# Patient Record
Sex: FEMALE | Race: WHITE | Employment: FULL TIME | URBAN - METROPOLITAN AREA
[De-identification: names, ages, dates, MRNs, and addresses within clinical notes are randomized per-mention and may not be internally consistent; named-entity substitution may affect disease eponyms.]

---

## 2017-01-05 DIAGNOSIS — G89.29 CHRONIC NONINTRACTABLE HEADACHE, UNSPECIFIED HEADACHE TYPE: ICD-10-CM

## 2017-01-05 DIAGNOSIS — R51.9 CHRONIC NONINTRACTABLE HEADACHE, UNSPECIFIED HEADACHE TYPE: ICD-10-CM

## 2017-01-05 DIAGNOSIS — M54.2 CERVICALGIA: ICD-10-CM

## 2017-01-05 RX ORDER — BUTALBITAL, ACETAMINOPHEN AND CAFFEINE 50; 325; 40 MG/1; MG/1; MG/1
TABLET ORAL
Qty: 60 TAB | Refills: 0 | Status: SHIPPED | OUTPATIENT
Start: 2017-01-05 | End: 2017-02-13 | Stop reason: SDUPTHER

## 2017-01-05 NOTE — TELEPHONE ENCOUNTER
Patient wants to get the medication for butalbital-acetaminophen-caffeine (FIORICET, ESGIC) -40 mg per tablet.   Please give her a call @ [phone]

## 2017-01-06 ENCOUNTER — TELEPHONE (OUTPATIENT)
Dept: FAMILY MEDICINE CLINIC | Age: 42
End: 2017-01-06

## 2017-02-13 DIAGNOSIS — M54.2 CERVICALGIA: ICD-10-CM

## 2017-02-13 DIAGNOSIS — R51.9 CHRONIC NONINTRACTABLE HEADACHE, UNSPECIFIED HEADACHE TYPE: ICD-10-CM

## 2017-02-13 DIAGNOSIS — G89.29 CHRONIC NONINTRACTABLE HEADACHE, UNSPECIFIED HEADACHE TYPE: ICD-10-CM

## 2017-02-13 RX ORDER — BUTALBITAL, ACETAMINOPHEN AND CAFFEINE 50; 325; 40 MG/1; MG/1; MG/1
TABLET ORAL
Qty: 60 TAB | Refills: 0 | Status: SHIPPED | OUTPATIENT
Start: 2017-02-13 | End: 2017-07-11 | Stop reason: SDUPTHER

## 2017-02-13 RX ORDER — BUTALBITAL, ACETAMINOPHEN AND CAFFEINE 50; 325; 40 MG/1; MG/1; MG/1
TABLET ORAL
Qty: 60 TAB | Refills: 0 | Status: SHIPPED | OUTPATIENT
Start: 2017-02-13 | End: 2017-03-16 | Stop reason: SDUPTHER

## 2017-02-13 NOTE — TELEPHONE ENCOUNTER
----- Message from Stefan Rivera sent at 2/13/2017  3:11 PM EST -----  Regarding: Dr Campoverde/rx refill  Pt's (p) [phone], pt said she needs for Dr Anya Laura to do a refill for her migraine medication   to be called [facility], the one listed in her chart . And would like a return call back from the nurse to discuss some test  results she had done. Her CT Scan US and a doppler.

## 2017-02-13 NOTE — TELEPHONE ENCOUNTER
Patient states that she has CT Scan, US and dopplers done at [facility] [address], they told her that they were faxing the results today. We have not received them yet.  Patients telephone # [phone]

## 2017-03-03 ENCOUNTER — OFFICE VISIT (OUTPATIENT)
Dept: FAMILY MEDICINE CLINIC | Age: 42
End: 2017-03-03

## 2017-03-03 VITALS
HEIGHT: 62 IN | DIASTOLIC BLOOD PRESSURE: 76 MMHG | SYSTOLIC BLOOD PRESSURE: 106 MMHG | OXYGEN SATURATION: 98 % | BODY MASS INDEX: 25.58 KG/M2 | TEMPERATURE: 98.5 F | HEART RATE: 87 BPM | WEIGHT: 139 LBS | RESPIRATION RATE: 26 BRPM

## 2017-03-03 DIAGNOSIS — J45.31 ASTHMATIC BRONCHITIS, MILD PERSISTENT, WITH ACUTE EXACERBATION: ICD-10-CM

## 2017-03-03 DIAGNOSIS — J32.0 MAXILLARY SINUSITIS, UNSPECIFIED CHRONICITY: Primary | ICD-10-CM

## 2017-03-03 RX ORDER — AZITHROMYCIN 250 MG/1
TABLET, FILM COATED ORAL
Qty: 6 TAB | Refills: 0 | Status: SHIPPED | OUTPATIENT
Start: 2017-03-03 | End: 2017-05-26 | Stop reason: ALTCHOICE

## 2017-03-03 RX ORDER — HYDROCODONE POLISTIREX AND CHLORPHENIRAMINE POLISTIREX 10; 8 MG/5ML; MG/5ML
2.5 SUSPENSION, EXTENDED RELEASE ORAL
Qty: 120 ML | Refills: 0 | Status: SHIPPED | OUTPATIENT
Start: 2017-03-03 | End: 2017-05-26 | Stop reason: DRUGHIGH

## 2017-03-03 RX ORDER — PREDNISONE 10 MG/1
10 TABLET ORAL
Qty: 10 TAB | Refills: 0 | Status: SHIPPED | OUTPATIENT
Start: 2017-03-03 | End: 2017-05-26 | Stop reason: ALTCHOICE

## 2017-03-03 NOTE — PROGRESS NOTES
Chief Complaint   Patient presents with    Cold Symptoms     Pt has cough, congestion and bodyaches.

## 2017-03-03 NOTE — MR AVS SNAPSHOT
Visit Information Date & Time Provider Department Dept. Phone Encounter #  
 3/3/2017 11:00 AM Yoni Lugo [phone] [id number] Follow-up Instructions Return if symptoms worsen or fail to improve. Upcoming Health Maintenance Date Due  
 PAP AKA CERVICAL CYTOLOGY 3/5/1997 DTaP/Tdap/Td series (2 - Td) 3/3/2027 Allergies as of 3/3/2017  Review Complete On: 3/3/2017 By: Karena Medina Severity Noted Reaction Type Reactions Adhesive Tape-silicones  03/54/7614    Other (comments) Pt states this burns her skin. Current Immunizations  Reviewed on 4/13/2015 No immunizations on file. Not reviewed this visit You Were Diagnosed With   
  
 Codes Comments Maxillary sinusitis, unspecified chronicity    -  Primary ICD-10-CM: J32.0 ICD-9-CM: 473.0 Asthmatic bronchitis, mild persistent, with acute exacerbation     ICD-10-CM: J45.31 
ICD-9-CM: 493.92 Vitals BP  
  
  
  
  
  
 106/76 (BP 1 Location: Left arm, BP Patient Position: Sitting) Vitals History BMI and BSA Data Body Mass Index Body Surface Area  
 25.84 kg/m 2 1.65 m 2 Preferred Pharmacy Pharmacy Name Phone [facility] [address] [phone] Your Updated Medication List  
  
   
This list is accurate as of: 3/3/17 11:21 AM.  Always use your most recent med list.  
  
  
  
  
 albuterol 90 mcg/actuation inhaler Commonly known as:  PROVENTIL HFA Take 2 Puffs by inhalation every four (4) hours as needed for Wheezing. albuterol-ipratropium 2.5 mg-0.5 mg/3 ml Nebu Commonly known as:  DUO-NEB  
3 mL by Nebulization route every six (6) hours as needed. aspirin 81 mg chewable tablet Take 81 mg by mouth daily. azithromycin 250 mg tablet Commonly known as:  Tiffani Cui Take 2 tablets today, then take 1 tablet daily * butalbital-acetaminophen-caffeine -40 mg per tablet Commonly known as:  FIORICET, ESGIC  
TAKE 1 TABLET BY MOUTH EVERY 6 HOURS AS NEEDED FOR HEADACHE * butalbital-acetaminophen-caffeine -40 mg per tablet Commonly known as:  FIORICET, ESGIC  
TAKE 1 TABLET BY MOUTH EVERY 6 HOURS AS NEEDED HEADACHE  
  
 calcium 500 mg Tab Take  by mouth. chlorpheniramine-HYDROcodone 10-8 mg/5 mL suspension Commonly known as:  Sachi Saas Take 2.5 mL by mouth every twelve (12) hours as needed for Cough. Max Daily Amount: 5 mL. cholecalciferol (VITAMIN D3) 5,000 unit Tab tablet Commonly known as:  VITAMIN D3 Take  by mouth daily. cyanocobalamin 100 mcg tablet Commonly known as:  VITAMIN B12 Take 100 mcg by mouth daily. multivitamin tablet Commonly known as:  ONE A DAY Take 1 Tab by mouth daily. predniSONE 10 mg tablet Commonly known as:  Therman Rail Take 1 Tab by mouth daily (with breakfast). PriLOSEC 40 mg capsule Generic drug:  omeprazole Take 40 mg by mouth daily. * Notice: This list has 2 medication(s) that are the same as other medications prescribed for you. Read the directions carefully, and ask your doctor or other care provider to review them with you. Prescriptions Printed Refills  
 azithromycin (ZITHROMAX) 250 mg tablet 0 Sig: Take 2 tablets today, then take 1 tablet daily Class: Print  
 chlorpheniramine-HYDROcodone (TUSSIONEX) 10-8 mg/5 mL suspension 0 Sig: Take 2.5 mL by mouth every twelve (12) hours as needed for Cough. Max Daily Amount: 5 mL. Class: Print Route: Oral  
 predniSONE (DELTASONE) 10 mg tablet 0 Sig: Take 1 Tab by mouth daily (with breakfast). Class: Print Route: Oral  
  
Follow-up Instructions Return if symptoms worsen or fail to improve. Introducing [facility] SERVICES! Pedro Harris introduces Bitstrips patient portal. Now you can access parts of your medical record, email your doctor's office, and request medication refills online. 1. In your internet browser, go to [email or web address] 2. Click on the First Time User? Click Here link in the Sign In box. You will see the New Member Sign Up page. 3. Enter your Bitstrips Access Code exactly as it appears below. You will not need to use this code after youve completed the sign-up process. If you do not sign up before the expiration date, you must request a new code. · Bitstrips Access Code: [email or web address] Expires: 6/1/2017 11:20 AM 
 
4. Enter the last four digits of your Social Security Number (xxxx) and Date of Birth (mm/dd/yyyy) as indicated and click Submit. You will be taken to the next sign-up page. 5. Create a Bitstrips ID. This will be your Bitstrips login ID and cannot be changed, so think of one that is secure and easy to remember. 6. Create a Bitstrips password. You can change your password at any time. 7. Enter your Password Reset Question and Answer. This can be used at a later time if you forget your password. 8. Enter your e-mail address. You will receive e-mail notification when new information is available in [address] 9. Click Sign Up. You can now view and download portions of your medical record. 10. Click the Download Summary menu link to download a portable copy of your medical information. If you have questions, please visit the Frequently Asked Questions section of the Bitstrips website. Remember, Bitstrips is NOT to be used for urgent needs. For medical emergencies, dial 911. Now available from your iPhone and Android! Please provide this summary of care documentation to your next provider. Your primary care clinician is listed as Karlos Bellamy. If you have any questions after today's visit, please call [phone].

## 2017-03-03 NOTE — PROGRESS NOTES
HISTORY OF PRESENT ILLNESS  Sanam Billy is a 37 y.o. female. 2 days sinusitis sx with forceful cough  Cold Symptoms   The history is provided by the patient. This is a new problem. The current episode started 2 days ago. The problem has been gradually worsening. The cough is productive of purulent sputum. There has been a fever of 101 - 101.9 F. Associated symptoms include headaches, rhinorrhea, sore throat and myalgias. Pertinent negatives include no chills. Review of Systems   Constitutional: Positive for malaise/fatigue. Negative for chills and fever. HENT: Positive for rhinorrhea and sore throat. Musculoskeletal: Positive for myalgias. Neurological: Positive for headaches. Physical Exam   Constitutional: She appears well-developed and well-nourished. HENT:   Head: Normocephalic and atraumatic. Right Ear: Tympanic membrane and ear canal normal.   Left Ear: Tympanic membrane and ear canal normal.   Nose: Mucosal edema and rhinorrhea present. Mouth/Throat: Posterior oropharyngeal erythema present. Eyes: Conjunctivae are normal. Pupils are equal, round, and reactive to light. Neck: Normal range of motion. Neck supple. Pulmonary/Chest: Effort normal.   Abdominal: Soft. Bowel sounds are normal.       ASSESSMENT and PLAN  Araceli was seen today for cold symptoms. Diagnoses and all orders for this visit:    Maxillary sinusitis, unspecified chronicity  -     azithromycin (ZITHROMAX) 250 mg tablet; Take 2 tablets today, then take 1 tablet daily  -     predniSONE (DELTASONE) 10 mg tablet; Take 1 Tab by mouth daily (with breakfast). Asthmatic bronchitis, mild persistent, with acute exacerbation  -     azithromycin (ZITHROMAX) 250 mg tablet; Take 2 tablets today, then take 1 tablet daily  -     chlorpheniramine-HYDROcodone (TUSSIONEX) 10-8 mg/5 mL suspension; Take 2.5 mL by mouth every twelve (12) hours as needed for Cough. Max Daily Amount: 5 mL.   -     predniSONE (DELTASONE) 10 mg tablet; Take 1 Tab by mouth daily (with breakfast). Follow-up Disposition:  Return if symptoms worsen or fail to improve.

## 2017-03-16 DIAGNOSIS — G89.29 CHRONIC NONINTRACTABLE HEADACHE, UNSPECIFIED HEADACHE TYPE: ICD-10-CM

## 2017-03-16 DIAGNOSIS — R51.9 CHRONIC NONINTRACTABLE HEADACHE, UNSPECIFIED HEADACHE TYPE: ICD-10-CM

## 2017-03-16 DIAGNOSIS — M54.2 CERVICALGIA: ICD-10-CM

## 2017-03-17 RX ORDER — BUTALBITAL, ACETAMINOPHEN AND CAFFEINE 50; 325; 40 MG/1; MG/1; MG/1
TABLET ORAL
Qty: 60 TAB | Refills: 0 | Status: SHIPPED | OUTPATIENT
Start: 2017-03-17 | End: 2017-05-16 | Stop reason: SDUPTHER

## 2017-05-16 DIAGNOSIS — R51.9 CHRONIC NONINTRACTABLE HEADACHE, UNSPECIFIED HEADACHE TYPE: ICD-10-CM

## 2017-05-16 DIAGNOSIS — M54.2 CERVICALGIA: ICD-10-CM

## 2017-05-16 DIAGNOSIS — G89.29 CHRONIC NONINTRACTABLE HEADACHE, UNSPECIFIED HEADACHE TYPE: ICD-10-CM

## 2017-05-16 RX ORDER — BUTALBITAL, ACETAMINOPHEN AND CAFFEINE 50; 325; 40 MG/1; MG/1; MG/1
TABLET ORAL
Qty: 60 TAB | Refills: 0 | Status: SHIPPED | OUTPATIENT
Start: 2017-05-16 | End: 2017-05-26 | Stop reason: SDUPTHER

## 2017-05-26 ENCOUNTER — OFFICE VISIT (OUTPATIENT)
Dept: FAMILY MEDICINE CLINIC | Age: 42
End: 2017-05-26

## 2017-05-26 VITALS
HEIGHT: 62 IN | HEART RATE: 87 BPM | WEIGHT: 139.6 LBS | SYSTOLIC BLOOD PRESSURE: 125 MMHG | OXYGEN SATURATION: 99 % | DIASTOLIC BLOOD PRESSURE: 57 MMHG | TEMPERATURE: 99.3 F | BODY MASS INDEX: 25.69 KG/M2 | RESPIRATION RATE: 16 BRPM

## 2017-05-26 DIAGNOSIS — R05.9 COUGH: ICD-10-CM

## 2017-05-26 DIAGNOSIS — J20.9 ACUTE BRONCHITIS, UNSPECIFIED ORGANISM: Primary | ICD-10-CM

## 2017-05-26 RX ORDER — BENZONATATE 200 MG/1
200 CAPSULE ORAL
Qty: 21 CAP | Refills: 0 | Status: SHIPPED | OUTPATIENT
Start: 2017-05-26 | End: 2017-06-02

## 2017-05-26 RX ORDER — HYDROCODONE POLISTIREX AND CHLORPHENIRAMINE POLISTIREX 10; 8 MG/5ML; MG/5ML
5 SUSPENSION, EXTENDED RELEASE ORAL
Qty: 50 ML | Refills: 0 | Status: SHIPPED | OUTPATIENT
Start: 2017-05-26 | End: 2017-05-31 | Stop reason: SDUPTHER

## 2017-05-26 RX ORDER — AZITHROMYCIN 250 MG/1
TABLET, FILM COATED ORAL
Qty: 6 TAB | Refills: 0 | Status: SHIPPED | OUTPATIENT
Start: 2017-05-26 | End: 2017-05-31

## 2017-05-26 NOTE — PROGRESS NOTES
HISTORY OF PRESENT ILLNESS  Rosa Cheney is a 40 y.o. female. HPI  Pt of Dr. Mitzi Bettencourt, here for an acute visit. She feels like a bus has run over her. Her  has had similar symptoms for over a week. Her symptoms started 3 days ago. 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 Her symptoms started with cough, sneezing, watery eyes. Symptoms started getting worse and worse and worse. Started using her nebulizer and inhaler yesterday. Having some shortness of breath. Having some wheezing. Cough is her biggest symptom, causing migraine headaches from coughing so hard. Cough is non-productive. Hx of asthmatic bronchitis, though this feels different. (+) fevers, chills, nonproductive cough, shortness of breath, wheezing, body aches  (-) ear pain  She is not a smoker; quit 1 year ago. Has tried mucinex, tylenol, albuterol, atrovent, and tussionex for her symptoms. Had 1 dose of tussionex left over from a previous illness. Past Medical History:   Diagnosis Date    Factor V deficiency ([address]     Heart rate fast     Migraine      Past Surgical History:   Procedure Laterality Date    DILATION AND CURETTAGE      HX APPENDECTOMY      HX  SECTION      HX TUBAL LIGATION  c sec     Family History   Problem Relation Age of Onset    Diabetes Mother     Alzheimer Mother     Hypertension Mother     High Cholesterol Mother     No Known Problems Father     Diabetes Sister     No Known Problems Brother     Immunodeficiency Sister     No Known Problems Sister      Social History     Social History    Marital status: SINGLE     Spouse name: N/A    Number of children: N/A    Years of education: N/A     Social History Main Topics    Smoking status: Former Smoker     Packs/day: 0.50     Years: 21.00     Types: Cigarettes    Smokeless tobacco: Never Used    Alcohol use 0.0 oz/week     0 Standard drinks or equivalent per week      Comment:  Occ    Drug use: No    Sexual activity: Yes     Partners: Male     Birth control/ protection: None     Other Topics Concern    None     Social History Narrative    ** Merged History Encounter **          Patient Active Problem List   Diagnosis Code    Intractable migraine with aura with status migrainosus G43. 111    Subacute maxillary sinusitis J01.00     Outpatient Encounter Prescriptions as of 5/26/2017   Medication Sig Dispense Refill    chlorpheniramine-HYDROcodone (TUSSIONEX) 10-8 mg/5 mL suspension Take 2.5 mL by mouth every twelve (12) hours as needed for Cough. Max Daily Amount: 5 mL. 120 mL 0    butalbital-acetaminophen-caffeine (FIORICET, ESGIC) -40 mg per tablet TAKE 1 TABLET BY MOUTH EVERY 6 HOURS AS NEEDED HEADACHE 60 Tab 0    albuterol-ipratropium (DUO-NEB) 2.5 mg-0.5 mg/3 ml nebu 3 mL by Nebulization route every six (6) hours as needed. 50 Nebule 3    albuterol (PROVENTIL HFA) 90 mcg/actuation inhaler Take 2 Puffs by inhalation every four (4) hours as needed for Wheezing. 1 Inhaler 0    aspirin 81 mg chewable tablet Take 81 mg by mouth daily.  [DISCONTINUED] butalbital-acetaminophen-caffeine (FIORICET, ESGIC) -40 mg per tablet TAKE 1 TABLET BY MOUTH EVERY 6 HOURS AS NEEDED FOR HEADACHE 60 Tab 0    [DISCONTINUED] azithromycin (ZITHROMAX) 250 mg tablet Take 2 tablets today, then take 1 tablet daily 6 Tab 0    [DISCONTINUED] predniSONE (DELTASONE) 10 mg tablet Take 1 Tab by mouth daily (with breakfast). 10 Tab 0    omeprazole (PRILOSEC) 40 mg capsule Take 40 mg by mouth daily.  multivitamin (ONE A DAY) tablet Take 1 Tab by mouth daily.  cyanocobalamin (VITAMIN B12) 100 mcg tablet Take 100 mcg by mouth daily.  cholecalciferol, VITAMIN D3, (VITAMIN D3) 5,000 unit tab tablet Take  by mouth daily.  calcium 500 mg tab Take  by mouth. No facility-administered encounter medications on file as of 5/26/2017.       Visit Vitals    /57 (BP 1 Location: Right arm, BP Patient Position: Sitting)    Pulse 87    Temp 99.3 °F (37.4 °C) (Oral)    Resp 16    Ht 5' 1.5\" (1.562 m)    Wt 139 lb 9.6 oz (63.3 kg)    LMP 05/05/2017 (Approximate)    SpO2 99%     L/min    BMI 25.95 kg/m2         Review of Systems   Constitutional: Positive for chills, fever and malaise/fatigue. HENT: Positive for sore throat. Negative for congestion and ear pain. Respiratory: Positive for cough, shortness of breath and wheezing. Neurological: Positive for headaches. Physical Exam   Constitutional: She appears well-developed and well-nourished. No distress. HENT:   Head: Normocephalic and atraumatic. Right Ear: External ear normal.   Left Ear: External ear normal.   Nose: Nose normal.   Mouth/Throat: Oropharynx is clear and moist.   Cardiovascular: Normal rate, regular rhythm, normal heart sounds and intact distal pulses. Pulmonary/Chest: Effort normal. No respiratory distress. She has decreased breath sounds in the right middle field and the right lower field. She has no wheezes. She has no rhonchi. Skin: She is not diaphoretic. Vitals reviewed. ASSESSMENT and PLAN    ICD-10-CM ICD-9-CM    1. Acute bronchitis, unspecified organism J20.9 466.0 azithromycin (ZITHROMAX) 250 mg tablet      benzonatate (TESSALON) 200 mg capsule      chlorpheniramine-HYDROcodone (TUSSIONEX) 10-8 mg/5 mL suspension   2. Cough R05 786.2 XR CHEST PA LAT      azithromycin (ZITHROMAX) 250 mg tablet      benzonatate (TESSALON) 200 mg capsule      chlorpheniramine-HYDROcodone (TUSSIONEX) 10-8 mg/5 mL suspension     1. Acute bronchitis  CXR appears normal to me, though her cough is severe and her other symptoms seem to support a CAP diagnosis. Will treat with azithromycin x 5 days to cover CAP, along with tessalon perles and tussionex for breaththrough. Continue mucinex, tylenol, breathing treatments. Follow-up Disposition:  Return if symptoms worsen or fail to improve.

## 2017-05-26 NOTE — PROGRESS NOTES
Chief Complaint   Patient presents with    Cough    Wheezing     Patient here for nonproductive cough and wheezing x 3 days.

## 2017-05-26 NOTE — MR AVS SNAPSHOT
Visit Information Date & Time Provider Department Dept. Phone Encounter #  
 5/26/2017  8:30 AM Topher Flynn NP [facility] [phone] [id number] Follow-up Instructions Return if symptoms worsen or fail to improve. Upcoming Health Maintenance Date Due  
 PAP AKA CERVICAL CYTOLOGY 3/5/1997 INFLUENZA AGE 9 TO ADULT 8/1/2017 DTaP/Tdap/Td series (2 - Td) 3/3/2027 Allergies as of 5/26/2017  Review Complete On: 5/26/2017 By: Topher Flynn NP Severity Noted Reaction Type Reactions Adhesive Tape-silicones  57/11/0023    Other (comments) Pt states this burns her skin. Current Immunizations  Reviewed on 4/13/2015 No immunizations on file. Not reviewed this visit You Were Diagnosed With   
  
 Codes Comments Acute bronchitis, unspecified organism    -  Primary ICD-10-CM: J20.9 ICD-9-CM: 466.0 Cough     ICD-10-CM: R05 ICD-9-CM: 388. 2 Vitals BP Pulse Temp Resp Height(growth percentile) Weight(growth percentile) 125/57 (BP 1 Location: Right arm, BP Patient Position: Sitting) 87 99.3 °F (37.4 °C) (Oral) 16 5' 1.5\" (1.562 m) 139 lb 9.6 oz (63.3 kg) LMP SpO2 PF BMI OB Status Smoking Status 05/05/2017 (Approximate) 99% 340 L/min 25.95 kg/m2 Having regular periods Former Smoker BMI and BSA Data Body Mass Index Body Surface Area  
 25.95 kg/m 2 1.66 m 2 Preferred Pharmacy Pharmacy Name Phone [facility] [address] [phone] Your Updated Medication List  
  
   
This list is accurate as of: 5/26/17  9:11 AM.  Always use your most recent med list.  
  
  
  
  
 albuterol 90 mcg/actuation inhaler Commonly known as:  PROVENTIL HFA Take 2 Puffs by inhalation every four (4) hours as needed for Wheezing. albuterol-ipratropium 2.5 mg-0.5 mg/3 ml Nebu Commonly known as:  Alvena Don  
 3 mL by Nebulization route every six (6) hours as needed. aspirin 81 mg chewable tablet Take 81 mg by mouth daily. azithromycin 250 mg tablet Commonly known as:  Zainab Zamora Take 2 tablets today, then take 1 tablet daily  
  
 benzonatate 200 mg capsule Commonly known as:  TESSALON Take 1 Cap by mouth three (3) times daily as needed for Cough for up to 7 days. Indications: COUGH  
  
 butalbital-acetaminophen-caffeine -40 mg per tablet Commonly known as:  FIORICET, ESGIC  
TAKE 1 TABLET BY MOUTH EVERY 6 HOURS AS NEEDED HEADACHE  
  
 calcium 500 mg Tab Take  by mouth. chlorpheniramine-HYDROcodone 10-8 mg/5 mL suspension Commonly known as:  Velma [place] Take 5 mL by mouth every twelve (12) hours as needed for Cough. Max Daily Amount: 10 mL. cholecalciferol (VITAMIN D3) 5,000 unit Tab tablet Commonly known as:  VITAMIN D3 Take  by mouth daily. cyanocobalamin 100 mcg tablet Commonly known as:  VITAMIN B12 Take 100 mcg by mouth daily. multivitamin tablet Commonly known as:  ONE A DAY Take 1 Tab by mouth daily. PriLOSEC 40 mg capsule Generic drug:  omeprazole Take 40 mg by mouth daily. Prescriptions Printed Refills  
 chlorpheniramine-HYDROcodone (TUSSIONEX) 10-8 mg/5 mL suspension 0 Sig: Take 5 mL by mouth every twelve (12) hours as needed for Cough. Max Daily Amount: 10 mL. Class: Print Route: Oral  
  
Prescriptions Sent to Pharmacy Refills  
 azithromycin (ZITHROMAX) 250 mg tablet 0 Sig: Take 2 tablets today, then take 1 tablet daily Class: Normal  
 Pharmacy: [facility] [address] [place] [address] #: [phone]  
 benzonatate (TESSALON) 200 mg capsule 0 Sig: Take 1 Cap by mouth three (3) times daily as needed for Cough for up to 7 days. Indications: COUGH  Class: Normal  
 Pharmacy: [facility] [address] [place] [address] #: [phone] Route: Oral  
  
Follow-up Instructions Return if symptoms worsen or fail to improve. To-Do List   
 05/26/2017 Imaging:  XR CHEST PA LAT Patient Instructions Bronchitis: Care Instructions Your Care Instructions Bronchitis is inflammation of the bronchial tubes, which carry air to the lungs. The tubes swell and produce mucus, or phlegm. The mucus and inflamed bronchial tubes make you cough. You may have trouble breathing. Most cases of bronchitis are caused by viruses like those that cause colds. Antibiotics usually do not help and they may be harmful. Bronchitis usually develops rapidly and lasts about 2 to 3 weeks in otherwise healthy people. Follow-up care is a key part of your treatment and safety. Be sure to make and go to all appointments, and call your doctor if you are having problems. It's also a good idea to know your test results and keep a list of the medicines you take. How can you care for yourself at home? · Take all medicines exactly as prescribed. Call your doctor if you think you are having a problem with your medicine. · Get some extra rest. 
· Take an over-the-counter pain medicine, such as acetaminophen (Tylenol), ibuprofen (Advil, Motrin), or naproxen (Aleve) to reduce fever and relieve body aches. Read and follow all instructions on the label. · Do not take two or more pain medicines at the same time unless the doctor told you to. Many pain medicines have acetaminophen, which is Tylenol. Too much acetaminophen (Tylenol) can be harmful. · Take an over-the-counter cough medicine that contains dextromethorphan to help quiet a dry, hacking cough so that you can sleep. Avoid cough medicines that have more than one active ingredient. Read and follow all instructions on the label. · Breathe moist air from a humidifier, hot shower, or sink filled with hot water. The heat and moisture will thin mucus so you can cough it out. · Do not smoke. Smoking can make bronchitis worse. If you need help quitting, talk to your doctor about stop-smoking programs and medicines. These can increase your chances of quitting for good. When should you call for help? Call 911 anytime you think you may need emergency care. For example, call if: 
· You have severe trouble breathing. Call your doctor now or seek immediate medical care if: 
· You have new or worse trouble breathing. · You cough up dark brown or bloody mucus (sputum). · You have a new or higher fever. · You have a new rash. Watch closely for changes in your health, and be sure to contact your doctor if: 
· You cough more deeply or more often, especially if you notice more mucus or a change in the color of your mucus. · You are not getting better as expected. Where can you learn more? Go to http://manuel-rody.info/. Enter H333 in the search box to learn more about \"Bronchitis: Care Instructions. \" Current as of: May 23, 2016 Content Version: 11.2 © 1432-8988 [employer]. Care instructions adapted under license by [employer] (which disclaims liability or warranty for this information). If you have questions about a medical condition or this instruction, always ask your healthcare professional. [place] [address] any warranty or liability for your use of this information. Introducing [facility] & HEALTH SERVICES! Екатерина Liang introduces [email or web address] patient portal. Now you can access parts of your medical record, email your doctor's office, and request medication refills online. 1. In your internet browser, go to [email or web address] 2. Click on the First Time User? Click Here link in the Sign In box. You will see the New Member Sign Up page. 3. Enter your NearbyNow Access Code exactly as it appears below. You will not need to use this code after youve completed the sign-up process. If you do not sign up before the expiration date, you must request a new code. · Near[employer]Now Access Code: [email or web address] Expires: 6/1/2017 12:20 PM 
 
4. Enter the last four digits of your Social Security Number (xxxx) and Date of Birth (mm/dd/yyyy) as indicated and click Submit. You will be taken to the next sign-up page. 5. Create a NearbyNow ID. This will be your NearbyNow login ID and cannot be changed, so think of one that is secure and easy to remember. 6. Create a NearbyNow password. You can change your password at any time. 7. Enter your Password Reset Question and Answer. This can be used at a later time if you forget your password. 8. Enter your e-mail address. You will receive e-mail notification when new information is available in [address] 9. Click Sign Up. You can now view and download portions of your medical record. 10. Click the Download Summary menu link to download a portable copy of your medical information. If you have questions, please visit the Frequently Asked Questions section of the NearbyNow website. Remember, NearbyNow is NOT to be used for urgent needs. For medical emergencies, dial 911. Now available from your iPhone and Android! Please provide this summary of care documentation to your next provider. Your primary care clinician is listed as Vernal Comas. If you have any questions after today's visit, please call [phone].

## 2017-05-26 NOTE — PATIENT INSTRUCTIONS
Bronchitis: Care Instructions  Your Care Instructions    Bronchitis is inflammation of the bronchial tubes, which carry air to the lungs. The tubes swell and produce mucus, or phlegm. The mucus and inflamed bronchial tubes make you cough. You may have trouble breathing. Most cases of bronchitis are caused by viruses like those that cause colds. Antibiotics usually do not help and they may be harmful. Bronchitis usually develops rapidly and lasts about 2 to 3 weeks in otherwise healthy people. Follow-up care is a key part of your treatment and safety. Be sure to make and go to all appointments, and call your doctor if you are having problems. It's also a good idea to know your test results and keep a list of the medicines you take. How can you care for yourself at home? · Take all medicines exactly as prescribed. Call your doctor if you think you are having a problem with your medicine. · Get some extra rest.  · Take an over-the-counter pain medicine, such as acetaminophen (Tylenol), ibuprofen (Advil, Motrin), or naproxen (Aleve) to reduce fever and relieve body aches. Read and follow all instructions on the label. · Do not take two or more pain medicines at the same time unless the doctor told you to. Many pain medicines have acetaminophen, which is Tylenol. Too much acetaminophen (Tylenol) can be harmful. · Take an over-the-counter cough medicine that contains dextromethorphan to help quiet a dry, hacking cough so that you can sleep. Avoid cough medicines that have more than one active ingredient. Read and follow all instructions on the label. · Breathe moist air from a humidifier, hot shower, or sink filled with hot water. The heat and moisture will thin mucus so you can cough it out. · Do not smoke. Smoking can make bronchitis worse. If you need help quitting, talk to your doctor about stop-smoking programs and medicines. These can increase your chances of quitting for good.   When should you call for help? Call 911 anytime you think you may need emergency care. For example, call if:  · You have severe trouble breathing. Call your doctor now or seek immediate medical care if:  · You have new or worse trouble breathing. · You cough up dark brown or bloody mucus (sputum). · You have a new or higher fever. · You have a new rash. Watch closely for changes in your health, and be sure to contact your doctor if:  · You cough more deeply or more often, especially if you notice more mucus or a change in the color of your mucus. · You are not getting better as expected. Where can you learn more? Go to http://manuel-rody.info/. Enter H333 in the search box to learn more about \"Bronchitis: Care Instructions. \"  Current as of: May 23, 2016  Content Version: 11.2  © 0316-2920 [employer]. Care instructions adapted under license by [employer] (which disclaims liability or warranty for this information). If you have questions about a medical condition or this instruction, always ask your healthcare professional. [address] any warranty or liability for your use of this information.

## 2017-05-31 DIAGNOSIS — J20.9 ACUTE BRONCHITIS, UNSPECIFIED ORGANISM: ICD-10-CM

## 2017-05-31 DIAGNOSIS — R05.9 COUGH: ICD-10-CM

## 2017-05-31 RX ORDER — AMOXICILLIN AND CLAVULANATE POTASSIUM 875; 125 MG/1; MG/1
1 TABLET, FILM COATED ORAL EVERY 12 HOURS
Qty: 14 TAB | Refills: 0 | Status: SHIPPED | OUTPATIENT
Start: 2017-05-31 | End: 2017-06-07

## 2017-05-31 RX ORDER — HYDROCODONE POLISTIREX AND CHLORPHENIRAMINE POLISTIREX 10; 8 MG/5ML; MG/5ML
5 SUSPENSION, EXTENDED RELEASE ORAL
Qty: 115 ML | Refills: 0 | Status: SHIPPED | OUTPATIENT
Start: 2017-05-31 | End: 2017-09-05

## 2017-05-31 NOTE — TELEPHONE ENCOUNTER
Patient wants to get something else called in, she said the Z-Josesito is not helping her.   Please give her a call @ [phone]

## 2017-05-31 NOTE — TELEPHONE ENCOUNTER
----- Message from Jazmin Eugene sent at 5/31/2017 11:50 AM EDT -----  Regarding: Dr Clarice Villanueva  Pt's needs a refill on her cough medicine,  didn't know the name, will  when ready,  if you have any question please call [phone].

## 2017-06-19 ENCOUNTER — OFFICE VISIT (OUTPATIENT)
Dept: FAMILY MEDICINE CLINIC | Age: 42
End: 2017-06-19

## 2017-06-19 VITALS
TEMPERATURE: 98 F | HEIGHT: 62 IN | HEART RATE: 99 BPM | SYSTOLIC BLOOD PRESSURE: 118 MMHG | BODY MASS INDEX: 25.4 KG/M2 | RESPIRATION RATE: 28 BRPM | WEIGHT: 138 LBS | DIASTOLIC BLOOD PRESSURE: 72 MMHG | OXYGEN SATURATION: 97 %

## 2017-06-19 DIAGNOSIS — L23.9 ALLERGIC CONTACT DERMATITIS, UNSPECIFIED TRIGGER: ICD-10-CM

## 2017-06-19 DIAGNOSIS — Z23 ENCOUNTER FOR IMMUNIZATION: ICD-10-CM

## 2017-06-19 DIAGNOSIS — S81.851A DOG BITE OF LOWER LEG, RIGHT, INITIAL ENCOUNTER: Primary | ICD-10-CM
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
## 2017-06-19 RX ORDER — AMOXICILLIN AND CLAVULANATE POTASSIUM 875; 125 MG/1; MG/1
1 TABLET, FILM COATED ORAL 2 TIMES DAILY WITH MEALS
Qty: 14 TAB | Refills: 0 | Status: SHIPPED | OUTPATIENT
Start: 2017-06-19 | End: 2017-06-26

## 2017-06-19 RX ORDER — TRIAMCINOLONE ACETONIDE 1 MG/G
CREAM TOPICAL
Qty: 45 G | Refills: 2 | Status: SHIPPED | OUTPATIENT
Start: 2017-06-19 | End: 2017-09-05

## 2017-06-19 NOTE — MR AVS SNAPSHOT
Visit Information Date & Time Provider Department Dept. Phone Encounter #  
 6/19/2017  2:15 PM [facility] Obrien, [facility] [phone] [id number] Upcoming Health Maintenance Date Due  
 PAP AKA CERVICAL CYTOLOGY 3/5/1997 INFLUENZA AGE 9 TO ADULT 8/1/2017 DTaP/Tdap/Td series (2 - Td) 3/3/2027 Allergies as of 6/19/2017  Review Complete On: 5/26/2017 By: Roz Moore NP Severity Noted Reaction Type Reactions Adhesive Tape-silicones  57/99/4425    Other (comments) Pt states this burns her skin. Current Immunizations  Reviewed on 4/13/2015 Name Date Tdap  Incomplete Not reviewed this visit You Were Diagnosed With   
  
 Codes Comments Dog bite of lower leg, right, initial encounter    -  Primary ICD-10-CM: H66.845K, W54. 0XXA ICD-9-CM: 891.0, E906.0 Allergic contact dermatitis, unspecified trigger     ICD-10-CM: L23.9 ICD-9-CM: 692.9 Encounter for immunization     ICD-10-CM: W40 ICD-9-CM: V03.89 Vitals BP Pulse Temp Resp Height(growth percentile) Weight(growth percentile) 118/72 (BP 1 Location: Right arm, BP Patient Position: Sitting) 99 98 °F (36.7 °C) (Oral) 28 5' 1.5\" (1.562 m) 138 lb (62.6 kg) LMP SpO2 BMI OB Status Smoking Status 05/05/2017 (Approximate) 97% 25.65 kg/m2 Having regular periods Former Smoker Vitals History BMI and BSA Data Body Mass Index Body Surface Area  
 25.65 kg/m 2 1.65 m 2 Preferred Pharmacy Pharmacy Name Phone [facility] [address] [place] [address] [facility] [phone] Your Updated Medication List  
  
   
This list is accurate as of: 6/19/17  2:18 PM.  Always use your most recent med list.  
  
  
  
  
 albuterol 90 mcg/actuation inhaler Commonly known as:  PROVENTIL HFA Take 2 Puffs by inhalation every four (4) hours as needed for Wheezing. albuterol-ipratropium 2.5 mg-0.5 mg/3 ml Nebu Commonly known as:  DUO-NEB  
3 mL by Nebulization route every six (6) hours as needed. amoxicillin-clavulanate 875-125 mg per tablet Commonly known as:  AUGMENTIN Take 1 Tab by mouth two (2) times daily (with meals) for 7 days. aspirin 81 mg chewable tablet Take 81 mg by mouth daily. butalbital-acetaminophen-caffeine -40 mg per tablet Commonly known as:  FIORICET, ESGIC  
TAKE 1 TABLET BY MOUTH EVERY 6 HOURS AS NEEDED HEADACHE  
  
 calcium 500 mg Tab Take  by mouth. chlorpheniramine-HYDROcodone 10-8 mg/5 mL suspension Commonly known as:  Gemini Lombard Take 5 mL by mouth every twelve (12) hours as needed for Cough. Max Daily Amount: 10 mL. cholecalciferol (VITAMIN D3) 5,000 unit Tab tablet Commonly known as:  VITAMIN D3 Take  by mouth daily. cyanocobalamin 100 mcg tablet Commonly known as:  VITAMIN B12 Take 100 mcg by mouth daily. multivitamin tablet Commonly known as:  ONE A DAY Take 1 Tab by mouth daily. PriLOSEC 40 mg capsule Generic drug:  omeprazole Take 40 mg by mouth daily. triamcinolone acetonide 0.1 % topical cream  
Commonly known as:  KENALOG Apply  to affected area three (3) times daily as needed for Skin Irritation. Prescriptions Sent to Pharmacy Refills  
 amoxicillin-clavulanate (AUGMENTIN) 875-125 mg per tablet 0 Sig: Take 1 Tab by mouth two (2) times daily (with meals) for 7 days. Class: Normal  
 Pharmacy: [facility] [address] [place] [address] Ph #: [phone] Route: Oral  
 triamcinolone acetonide (KENALOG) 0.1 % topical cream 2 Sig: Apply  to affected area three (3) times daily as needed for Skin Irritation. Class: Normal  
 Pharmacy: [facility] [address] [place] [address] Ph #: [phone] Route: Topical  
  
We Performed the Following TETANUS, DIPHTHERIA TOXOIDS AND ACELLULAR PERTUSSIS VACCINE (TDAP), IN INDIVIDS. >=7, IM [id number] CPT(R)] Introducing [facility]! Huey Sarah introduces [employer] patient portal. Now you can access parts of your medical record, email your doctor's office, and request medication refills online. 1. In your internet browser, go to [email or web address] 2. Click on the First Time User? Click Here link in the Sign In box. You will see the New Member Sign Up page. 3. Enter your [employer] Access Code exactly as it appears below. You will not need to use this code after youve completed the sign-up process. If you do not sign up before the expiration date, you must request a new code. · [employer] Access Code: [email or web address] Expires: 9/17/2017  2:17 PM 
 
4. Enter the last four digits of your Social Security Number (xxxx) and Date of Birth (mm/dd/yyyy) as indicated and click Submit. You will be taken to the next sign-up page. 5. Create a [employer] ID. This will be your [employer] login ID and cannot be changed, so think of one that is secure and easy to remember. 6. Create a [employer] password. You can change your password at any time. 7. Enter your Password Reset Question and Answer. This can be used at a later time if you forget your password. 8. Enter your e-mail address. You will receive e-mail notification when new information is available in [address] 9. Click Sign Up. You can now view and download portions of your medical record. 10. Click the Download Summary menu link to download a portable copy of your medical information. If you have questions, please visit the Frequently Asked Questions section of the [employer] website. Remember, [employer] is NOT to be used for urgent needs. For medical emergencies, dial 911. Now available from your iPhone and Android! Please provide this summary of care documentation to your next provider. Your primary care clinician is listed as Suzanne Frazier. If you have any questions after today's visit, please call [phone].

## 2017-06-19 NOTE — PROGRESS NOTES
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 female. dog bite rt thigh 1 week ago ,painful. New rash from bandaid. Recently diagnosed with endometriosis  Dog Bite   The history is provided by the patient. This is a new problem. The current episode started more than 1 week ago. The problem occurs daily. The problem has been gradually improving. Pertinent negatives include no headaches and no shortness of breath. Allergic Reaction   The history is provided by the patient. This is a new problem. The current episode started more than 2 days ago. The problem occurs daily. The problem has not changed since onset. Pertinent negatives include no headaches and no shortness of breath. Review of Systems   Constitutional: Negative for diaphoresis and fever. Respiratory: Negative for shortness of breath. Musculoskeletal: Positive for myalgias. Skin: Positive for itching and rash. Neurological: Negative for headaches. Physical Exam   Constitutional: She appears well-developed and well-nourished. HENT:   Head: Normocephalic and atraumatic. Right Ear: External ear normal.   Left Ear: External ear normal.   Nose: Nose normal.   Mouth/Throat: Oropharynx is clear and moist.   Cardiovascular: Normal rate. Pulmonary/Chest: Effort normal and breath sounds normal.   Abdominal: Soft. Bowel sounds are normal.   Skin: Skin is warm and dry. Rash noted. There is erythema. puncture wounds rt upper thigh ,healing nicely,little swelling    Red rash in bandaid shape surrounding wounds       ASSESSMENT and PLAN  Ariana Saleh was seen today for dog bite. Diagnoses and all orders for this visit:    Dog bite of lower leg, right, initial encounter  -     amoxicillin-clavulanate (AUGMENTIN) 875-125 mg per tablet; Take 1 Tab by mouth two (2) times daily (with meals) for 7 days.     Allergic contact dermatitis, unspecified trigger  -     triamcinolone acetonide (KENALOG) 0.1 % topical cream; Apply  to affected area three (3) times daily as needed for Skin Irritation.     Encounter for immunization  -     Tetanus, diphtheria toxoids and acellular pertussis (TDAP) vaccine, in individuals >=7 years, IM      Follow-up Disposition: Not on File

## 2017-07-05 ENCOUNTER — OFFICE VISIT (OUTPATIENT)
Dept: FAMILY MEDICINE CLINIC | Age: 42
End: 2017-07-05

## 2017-07-05 ENCOUNTER — TELEPHONE (OUTPATIENT)
Dept: FAMILY MEDICINE CLINIC | Age: 42
End: 2017-07-05

## 2017-07-05 VITALS
HEART RATE: 94 BPM | DIASTOLIC BLOOD PRESSURE: 77 MMHG | BODY MASS INDEX: 25.91 KG/M2 | TEMPERATURE: 97.2 F | OXYGEN SATURATION: 99 % | RESPIRATION RATE: 16 BRPM | SYSTOLIC BLOOD PRESSURE: 130 MMHG | HEIGHT: 62 IN | WEIGHT: 140.8 LBS

## 2017-07-05 DIAGNOSIS — N80.9 ENDOMETRIOSIS: ICD-10-CM

## 2017-07-05 DIAGNOSIS — R10.32 LLQ ABDOMINAL PAIN: Primary | ICD-10-CM

## 2017-07-05 LAB
BILIRUB UR QL STRIP: NEGATIVE
GLUCOSE UR-MCNC: NEGATIVE MG/DL
HCG URINE, QL. (POC): NEGATIVE
KETONES P FAST UR STRIP-MCNC: NEGATIVE MG/DL
PH UR STRIP: 6.5 [PH] (ref 4.6–8)
PROT UR QL STRIP: NEGATIVE MG/DL
SP GR UR STRIP: 1.02 (ref 1–1.03)
UA UROBILINOGEN AMB POC: NORMAL (ref 0.2–1)
URINALYSIS CLARITY POC: CLEAR
URINALYSIS COLOR POC: YELLOW
URINE BLOOD POC: NEGATIVE
URINE LEUKOCYTES POC: NEGATIVE
URINE NITRITES POC: NEGATIVE
VALID INTERNAL CONTROL?: YES

## 2017-07-05 RX ORDER — HYDROCODONE BITARTRATE AND ACETAMINOPHEN 10; 325 MG/1; MG/1
1 TABLET ORAL
Qty: 30 TAB | Refills: 0 | Status: SHIPPED | OUTPATIENT
Start: 2017-07-05 | End: 2017-07-25 | Stop reason: SDUPTHER

## 2017-07-05 RX ORDER — KETOROLAC TROMETHAMINE 30 MG/ML
30 INJECTION, SOLUTION INTRAMUSCULAR; INTRAVENOUS ONCE
Qty: 1 VIAL | Refills: 0
Start: 2017-07-05 | End: 2017-07-05

## 2017-07-05 RX ORDER — PROMETHAZINE HYDROCHLORIDE 25 MG/ML
25 INJECTION, SOLUTION INTRAMUSCULAR; INTRAVENOUS ONCE
Qty: 1 VIAL | Refills: 0
Start: 2017-07-05 | End: 2017-07-05

## 2017-07-05 NOTE — PROGRESS NOTES
HISTORY OF PRESENT ILLNESS  Jeronimo Cesar is a 40 y.o. female. worsening llq abdominal discomfort x 3 days. Currently being evaluated for endometriosis by Gyn m,Dr Mary Ellen Zhang. Recently had unremarkable colonoscopy. Denies fever chills,nausea or vomiting. .  Abdominal Pain   The history is provided by the patient. This is a new problem. The problem occurs daily. The problem has been gradually worsening. Associated symptoms include abdominal pain. Pertinent negatives include no headaches. Review of Systems   Constitutional: Negative for chills, fever and malaise/fatigue. Respiratory: Negative for cough. Gastrointestinal: Positive for abdominal pain. Negative for blood in stool, constipation and melena. Genitourinary: Negative for dysuria, frequency and urgency. Musculoskeletal: Negative for myalgias. Skin: Negative for rash. Neurological: Negative for headaches. Physical Exam   Constitutional: She appears well-developed and well-nourished. She appears distressed. HENT:   Head: Normocephalic and atraumatic. Right Ear: External ear normal.   Left Ear: External ear normal.   Nose: Nose normal.   Mouth/Throat: Oropharynx is clear and moist.   Cardiovascular: Normal rate and regular rhythm. Pulmonary/Chest: Effort normal and breath sounds normal.   Abdominal: Soft. Bowel sounds are normal. She exhibits no distension and no mass. There is tenderness. There is guarding. There is no rebound. Moderate direct LLQ TENDERNESS   Skin: Skin is dry. ASSESSMENT and PLAN  Maura García was seen today for abdominal pain.     Diagnoses and all orders for this visit:    LLQ abdominal pain  -     METABOLIC PANEL, COMPREHENSIVE  -     AMB POC URINALYSIS DIP STICK AUTO W/O MICRO  -     AMB POC COMPLETE CBC, AUTOMATED  -     AMB POC URINE PREGNANCY TEST, VISUAL COLOR COMPARISON  -     KETOROLAC TROMETHAMINE INJ  -     MD THER/PROPH/DIAG INJECTION, SUBCUT/IM  -     PROMETHAZINE HCL INJECTION  -     MD THER/PROPH/DIAG INJECTION, SUBCUT/IM  -     HYDROcodone-acetaminophen (NORCO)  mg tablet; Take 1 Tab by mouth every six (6) hours as needed for Pain. Max Daily Amount: 4 Tabs. Endometriosis  -     METABOLIC PANEL, COMPREHENSIVE  -     AMB POC URINALYSIS DIP STICK AUTO W/O MICRO  -     AMB POC COMPLETE CBC, AUTOMATED  -     AMB POC URINE PREGNANCY TEST, VISUAL COLOR COMPARISON  -     HYDROcodone-acetaminophen (NORCO)  mg tablet; Take 1 Tab by mouth every six (6) hours as needed for Pain. Max Daily Amount: 4 Tabs. Other orders  -     ketorolac (TORADOL) 30 mg/mL (1 mL) injection; 1 mL by IntraVENous route once for 1 dose. -     promethazine (PHENERGAN) 25 mg/mL injection; 1 mL by IntraMUSCular route once for 1 dose. Follow-up Disposition:  Return in about 1 week (around 7/12/2017).

## 2017-07-05 NOTE — PROGRESS NOTES
Patient received Promethazine 25mg IM in left gluteal and Toradol 30mg IM in right gluteal without any difficulty.

## 2017-07-05 NOTE — PROGRESS NOTES
Chief Complaint   Patient presents with    Abdominal Pain   denies n/v no diahrria, not eating  X 3 weeks.

## 2017-07-05 NOTE — MR AVS SNAPSHOT
Visit Information Date & Time Provider Department Dept. Phone Encounter #  
 7/5/2017  2:45 PM Blane Lerma, [facility] [phone] [id number] Follow-up Instructions Return in about 1 week (around 7/12/2017). Upcoming Health Maintenance Date Due  
 PAP AKA CERVICAL CYTOLOGY 3/5/1997 INFLUENZA AGE 9 TO ADULT 8/1/2017 DTaP/Tdap/Td series (2 - Td) 6/19/2027 Allergies as of 7/5/2017  Review Complete On: 6/19/2017 By: Blane Lerma MD  
  
 Severity Noted Reaction Type Reactions Adhesive Tape-silicones  34/48/9230    Other (comments) Pt states this burns her skin. Current Immunizations  Reviewed on 6/19/2017 Name Date Tdap 6/19/2017 Not reviewed this visit You Were Diagnosed With   
  
 Codes Comments LLQ abdominal pain    -  Primary ICD-10-CM: R10.32 
ICD-9-CM: 789.04 Endometriosis     ICD-10-CM: N80.9 ICD-9-CM: 617.9 Vitals BP Pulse Temp Resp Height(growth percentile) Weight(growth percentile) 130/77 (BP 1 Location: Left arm, BP Patient Position: Sitting) 94 97.2 °F (36.2 °C) (Oral) 16 5' 1.5\" (1.562 m) 140 lb 12.8 oz (63.9 kg) LMP SpO2 BMI OB Status Smoking Status 06/10/2017 99% 26.17 kg/m2 Having regular periods Former Smoker BMI and BSA Data Body Mass Index Body Surface Area  
 26.17 kg/m 2 1.67 m 2 Preferred Pharmacy Pharmacy Name Phone [facility] [address] [place] [address] [employer] [facility] [phone] Your Updated Medication List  
  
   
This list is accurate as of: 7/5/17  3:16 PM.  Always use your most recent med list.  
  
  
  
  
 albuterol 90 mcg/actuation inhaler Commonly known as:  PROVENTIL HFA Take 2 Puffs by inhalation every four (4) hours as needed for Wheezing. albuterol-ipratropium 2.5 mg-0.5 mg/3 ml Nebu Commonly known as:  Noam Comes  
 3 mL by Nebulization route every six (6) hours as needed. aspirin 81 mg chewable tablet Take 81 mg by mouth daily. butalbital-acetaminophen-caffeine -40 mg per tablet Commonly known as:  FIORICET, ESGIC  
TAKE 1 TABLET BY MOUTH EVERY 6 HOURS AS NEEDED HEADACHE  
  
 calcium 500 mg Tab Take  by mouth. chlorpheniramine-HYDROcodone 10-8 mg/5 mL suspension Commonly known as:  [employer] Take 5 mL by mouth every twelve (12) hours as needed for Cough. Max Daily Amount: 10 mL. cholecalciferol (VITAMIN D3) 5,000 unit Tab tablet Commonly known as:  VITAMIN D3 Take  by mouth daily. cyanocobalamin 100 mcg tablet Commonly known as:  VITAMIN B12 Take 100 mcg by mouth daily. HYDROcodone-acetaminophen  mg tablet Commonly known as:  Hurman Grist Take 1 Tab by mouth every six (6) hours as needed for Pain. Max Daily Amount: 4 Tabs.  
  
 ketorolac 30 mg/mL (1 mL) injection Commonly known as:  TORADOL  
1 mL by IntraVENous route once for 1 dose. multivitamin tablet Commonly known as:  ONE A DAY Take 1 Tab by mouth daily. PriLOSEC 40 mg capsule Generic drug:  omeprazole Take 40 mg by mouth daily. promethazine 25 mg/mL injection Commonly known as:  PHENERGAN  
1 mL by IntraMUSCular route once for 1 dose. triamcinolone acetonide 0.1 % topical cream  
Commonly known as:  KENALOG Apply  to affected area three (3) times daily as needed for Skin Irritation. Prescriptions Printed Refills HYDROcodone-acetaminophen (NORCO)  mg tablet 0 Sig: Take 1 Tab by mouth every six (6) hours as needed for Pain. Max Daily Amount: 4 Tabs. Class: Print Route: Oral  
  
We Performed the Following AMB POC COMPLETE CBC, AUTOMATED [92015 CPT(R)] AMB POC URINALYSIS DIP STICK AUTO W/O MICRO [52840 CPT(R)] AMB POC URINE PREGNANCY TEST, VISUAL COLOR COMPARISON [85238 CPT(R)] KETOROLAC TROMETHAMINE INJ [ HCP] METABOLIC PANEL, COMPREHENSIVE [24473 CPT(R)] MT THER/PROPH/DIAG INJECTION, SUBCUT/IM [id number] CPT(R)] MT THER/PROPH/DIAG INJECTION, SUBCUT/IM [id number] CPT(R)] PROMETHAZINE HCL INJECTION [ HCP] Follow-up Instructions Return in about 1 week (around 7/12/2017). Introducing [facility]! Debbie Cruz introduces [employer] patient portal. Now you can access parts of your medical record, email your doctor's office, and request medication refills online. 1. In your internet browser, go to [email or web address] 2. Click on the First Time User? Click Here link in the Sign In box. You will see the New Member Sign Up page. 3. Enter your [employer] Access Code exactly as it appears below. You will not need to use this code after youve completed the sign-up process. If you do not sign up before the expiration date, you must request a new code. · [employer] Access Code: [email or web address] Expires: 9/17/2017  2:17 PM 
 
4. Enter the last four digits of your Social Security Number (xxxx) and Date of Birth (mm/dd/yyyy) as indicated and click Submit. You will be taken to the next sign-up page. 5. Create a [employer] ID. This will be your [employer] login ID and cannot be changed, so think of one that is secure and easy to remember. 6. Create a [employer] password. You can change your password at any time. 7. Enter your Password Reset Question and Answer. This can be used at a later time if you forget your password. 8. Enter your e-mail address. You will receive e-mail notification when new information is available in [address] 9. Click Sign Up. You can now view and download portions of your medical record. 10. Click the Download Summary menu link to download a portable copy of your medical information.  
 
If you have questions, please visit the Frequently Asked Questions section of the [employer]. Remember, [employer] is NOT to be used for urgent needs. For medical emergencies, dial 911. Now available from your iPhone and Android! Please provide this summary of care documentation to your next provider. Your primary care clinician is listed as Salvador Rockwell. If you have any questions after today's visit, please call [phone].

## 2017-07-06 LAB
ALBUMIN SERPL-MCNC: 4.5 G/DL (ref 3.5–5.5)
ALBUMIN/GLOB SERPL: 2 {RATIO} (ref 1.2–2.2)
ALP SERPL-CCNC: 42 IU/L (ref 39–117)
ALT SERPL-CCNC: 10 IU/L (ref 0–32)
AST SERPL-CCNC: 14 IU/L (ref 0–40)
BILIRUB SERPL-MCNC: 0.3 MG/DL (ref 0–1.2)
BUN SERPL-MCNC: 12 MG/DL (ref 6–24)
BUN/CREAT SERPL: 15 (ref 9–23)
CALCIUM SERPL-MCNC: 9.6 MG/DL (ref 8.7–10.2)
CHLORIDE SERPL-SCNC: 106 MMOL/L (ref 96–106)
CO2 SERPL-SCNC: 24 MMOL/L (ref 18–29)
CREAT SERPL-MCNC: 0.8 MG/DL (ref 0.57–1)
GLOBULIN SER CALC-MCNC: 2.3 G/DL (ref 1.5–4.5)
GLUCOSE SERPL-MCNC: 95 MG/DL (ref 65–99)
POTASSIUM SERPL-SCNC: 4.6 MMOL/L (ref 3.5–5.2)
PROT SERPL-MCNC: 6.8 G/DL (ref 6–8.5)
SODIUM SERPL-SCNC: 143 MMOL/L (ref 134–144)

## 2017-07-11 DIAGNOSIS — G89.29 CHRONIC NONINTRACTABLE HEADACHE, UNSPECIFIED HEADACHE TYPE: ICD-10-CM

## 2017-07-11 DIAGNOSIS — M54.2 CERVICALGIA: ICD-10-CM

## 2017-07-11 DIAGNOSIS — R51.9 CHRONIC NONINTRACTABLE HEADACHE, UNSPECIFIED HEADACHE TYPE: ICD-10-CM

## 2017-07-11 RX ORDER — BUTALBITAL, ACETAMINOPHEN AND CAFFEINE 50; 325; 40 MG/1; MG/1; MG/1
TABLET ORAL
Qty: 60 TAB | Refills: 0 | Status: SHIPPED | OUTPATIENT
Start: 2017-07-11 | End: 2017-09-25 | Stop reason: SDUPTHER

## 2017-07-25 DIAGNOSIS — R10.32 LLQ ABDOMINAL PAIN: ICD-10-CM

## 2017-07-25 DIAGNOSIS — N80.9 ENDOMETRIOSIS: ICD-10-CM

## 2017-07-25 NOTE — TELEPHONE ENCOUNTER
Last filled 7/5/17  reviewed. Patient states she has the surgery scheduled for 9/13/17. Patient is requesting pain medication hydrocodone until she has her surgery.

## 2017-07-25 NOTE — TELEPHONE ENCOUNTER
Patient wants a return call regarding the pain she has been having in her stomach, she is having surg 09/13/2017.   Please give her a call @ [phone]

## 2017-07-26 RX ORDER — HYDROCODONE BITARTRATE AND ACETAMINOPHEN 10; 325 MG/1; MG/1
1 TABLET ORAL
Qty: 30 TAB | Refills: 0 | Status: SHIPPED | OUTPATIENT
Start: 2017-07-26 | End: 2017-08-14 | Stop reason: SDUPTHER

## 2017-08-14 DIAGNOSIS — R10.32 LLQ ABDOMINAL PAIN: ICD-10-CM

## 2017-08-14 DIAGNOSIS — N80.9 ENDOMETRIOSIS: Primary | ICD-10-CM

## 2017-08-14 RX ORDER — HYDROCODONE BITARTRATE AND ACETAMINOPHEN 10; 325 MG/1; MG/1
1 TABLET ORAL
Qty: 30 TAB | Refills: 0 | Status: SHIPPED | OUTPATIENT
Start: 2017-08-14 | End: 2017-09-05

## 2017-09-05 ENCOUNTER — APPOINTMENT (OUTPATIENT)
Dept: ULTRASOUND IMAGING | Age: 42
End: 2017-09-05
Attending: PHYSICIAN ASSISTANT
Payer: MEDICAID

## 2017-09-05 ENCOUNTER — HOSPITAL ENCOUNTER (EMERGENCY)
Age: 42
Discharge: HOME | End: 2017-09-05
Attending: EMERGENCY MEDICINE | Admitting: EMERGENCY MEDICINE
Payer: MEDICAID

## 2017-09-05 VITALS
OXYGEN SATURATION: 99 % | SYSTOLIC BLOOD PRESSURE: 117 MMHG | HEART RATE: 87 BPM | DIASTOLIC BLOOD PRESSURE: 65 MMHG | RESPIRATION RATE: 18 BRPM | TEMPERATURE: 97.9 F | WEIGHT: 136.24 LBS | BODY MASS INDEX: 25.33 KG/M2

## 2017-09-05 DIAGNOSIS — M79.605 LEFT LEG PAIN: Primary | ICD-10-CM

## 2017-09-05 PROCEDURE — 99282 EMERGENCY DEPT VISIT SF MDM: CPT

## 2017-09-05 PROCEDURE — 93971 EXTREMITY STUDY: CPT

## 2017-09-05 RX ORDER — OXYCODONE AND ACETAMINOPHEN 5; 325 MG/1; MG/1
1 TABLET ORAL
COMMUNITY
End: 2019-05-13 | Stop reason: ALTCHOICE

## 2017-09-05 RX ORDER — ENOXAPARIN SODIUM 100 MG/ML
40 INJECTION SUBCUTANEOUS DAILY
COMMUNITY
End: 2019-05-13 | Stop reason: ALTCHOICE

## 2017-09-05 NOTE — DISCHARGE INSTRUCTIONS
Thank you for allowing us to provide you with care today. We hope we addressed all of your concerns and needs. We strive to provide excellent quality care in the Emergency Department. Please rate us as excellent, as anything less than excellent does not meet our expectations. If you feel that you have not received excellent quality care or timely care, please ask to speak to the nurse manager. Please choose us in the future for your continued health care needs. The exam and treatment you received in the Emergency Department were for an urgent problem and are not intended as complete care. It is important that you follow-up with a doctor, nurse practitioner, or  [id number] assistant to: (1) confirm your diagnosis, (2) re-evaluation of changes in your illness and treatment, and (3) for ongoing care. If your symptoms become worse or you do not improve as expected and you are unable to reach your usual health care provider, you should return to the Emergency Department. We are available 24 hours a day. Take this sheet with you when you go to your follow-up visit. If you have any problem arranging the follow-up visit, contact the Emergency Department immediately. Make an appointment with your Primary Care doctor for follow up of this visit. Return to the ER if you are unable to be seen in the time recommended on your discharge instructions.

## 2017-09-05 NOTE — ED NOTES
Patient arrives for pain in L leg that started yesterday. Reports that the pain is right below L buttocks that radiates down. Patient had laparoscopic done to remove adhesions in colon and stomach on 8/24. Patient has been on lovenox shots since and is compliant with medications. Referred here by Dr. Marlene Jerez d/t hx of DVT. Denies shortness of breath, CP. Reports that she is having some belly swelling, denies abd pain, n/v. No distress noted. Will continue to monitor.

## 2017-09-05 NOTE — ED PROVIDER NOTES
HPI Comments: Tara Lowe is a 40 y.o. female with pertinent PMHx of Factor V Deficiency who presents ambulatory with spouse to ED c/o constant, moderate upper left leg pain that onset yesterday. Pt states she is status post laparoscopic abdominal surgery on  to remove colon adhesions. She has been on Lovenox injections since then, but she reports calling her surgeon this morning and was referred to the ED to rule out DVT. Pt notes taking Oxycodone for pain at 6:00 AM today, and she defers any pain medication at the time of exam. Pt denies any shortness of breath, palpitations. PCP:  Abbie Chance MD    Social Hx:  tobacco use (former), EtOH use (+)    There are no other complaints, changes or physical findings at this time. The history is provided by the patient. No  was used. Past Medical History:   Diagnosis Date    Factor V deficiency ([address]     Heart rate fast     Migraine        Past Surgical History:   Procedure Laterality Date    ABDOMEN SURGERY PROC UNLISTED      laproscopy removed adhesions on colon    DILATION AND CURETTAGE      HX APPENDECTOMY      HX  SECTION      HX TUBAL LIGATION  c sec         Family History:   Problem Relation Age of Onset    Diabetes Mother     Alzheimer Mother     Hypertension Mother     High Cholesterol Mother     No Known Problems Father     Diabetes Sister     No Known Problems Brother     Immunodeficiency Sister     No Known Problems Sister        Social History     Social History    Marital status: SINGLE     Spouse name: N/A    Number of children: N/A    Years of education: N/A     Occupational History    Not on file. Social History Main Topics    Smoking status: Former Smoker     Packs/day: 0.50     Years: 21.00     Types: Cigarettes     Quit date: 3/5/2016    Smokeless tobacco: Never Used    Alcohol use 0.0 oz/week     0 Standard drinks or equivalent per week      Comment:  Occ    Drug use: No    Sexual activity: Yes     Partners: Male     Birth control/ protection: IUD     Other Topics Concern    Not on file     Social History Narrative    ** Merged History Encounter **              ALLERGIES: Adhesive tape-silicones    Review of Systems   Constitutional: Negative for fatigue and fever. HENT: Negative for ear pain and sore throat. Eyes: Negative for pain, redness and visual disturbance. Respiratory: Negative for cough and shortness of breath. Cardiovascular: Negative for chest pain and palpitations. Gastrointestinal: Negative for abdominal pain, nausea and vomiting. Genitourinary: Negative for dysuria, frequency and urgency. Musculoskeletal: Positive for myalgias (left leg pain). Negative for back pain, gait problem, neck pain and neck stiffness. Skin: Negative for rash and wound. Neurological: Negative for dizziness, weakness, light-headedness, numbness and headaches. Vitals:    09/05/17 1007   BP: 117/65   Pulse: 87   Resp: 18   Temp: 97.9 °F (36.6 °C)   SpO2: 99%   Weight: 61.8 kg (136 lb 3.9 oz)            Physical Exam   Constitutional: She is oriented to person, place, and time. She appears well-developed and well-nourished. Non-toxic appearance. No distress. HENT:   Head: Normocephalic and atraumatic. Right Ear: External ear normal.   Left Ear: External ear normal.   Nose: Nose normal.   Mouth/Throat: Uvula is midline. No trismus in the jaw. Eyes: Conjunctivae and EOM are normal. Pupils are equal, round, and reactive to light. No scleral icterus. Neck: Normal range of motion and full passive range of motion without pain. Cardiovascular: Normal rate and regular rhythm. Pulmonary/Chest: Effort normal. No accessory muscle usage. No tachypnea. No respiratory distress. She has no decreased breath sounds. She has no wheezes. Abdominal: Soft. There is no tenderness. Musculoskeletal: Normal range of motion. LEFT LEG:   Good symmetry.   No bruising, redness, swelling. Posterior muscular pain of the gluteus and proximal hamstring. Neurological: She is alert and oriented to person, place, and time. She is not disoriented. No cranial nerve deficit. GCS eye subscore is 4. GCS verbal subscore is 5. GCS motor subscore is 6. Skin: Skin is intact. No rash noted. Psychiatric: She has a normal mood and affect. Her speech is normal.   Nursing note and vitals reviewed. MDM  Number of Diagnoses or Management Options  Diagnosis management comments: DDx: muscle strain, DVT, sciatica        Amount and/or Complexity of Data Reviewed  Tests in the radiology section of CPT®: reviewed and ordered  Review and summarize past medical records: yes    Patient Progress  Patient progress: stable    ED Course       Procedures    11:37 AM  Results reviewed. Pt states she has plenty of pain medication. Pt states she will follow up with her OBGYN in 2 days. IMAGING RESULTS:  Study Result      INDICATION:  Leg swelling, pain, DVT suspected     COMPARISON: None.     FINDINGS: Duplex Doppler sonography of the left lower extremity was performed  from the groin to the calf. The left common femoral, femoral and popliteal veins  are compressible with normal color-flow and wave forms and response to  physiologic maneuvers including Valsalva and augmentation.     IMPRESSION  IMPRESSION:  No deep venous thrombosis identified. IMPRESSION:  1. Left leg pain        PLAN:  1. Discharge Medication List as of 9/5/2017 11:40 AM        2.    Follow-up Information     Follow up With Details Comments Rhonda Aguero MD Schedule an appointment as soon as possible for a visit OB/GYN: keep your upcoming appointment [address] [facility] [address]   [facility] [address]      Sofia Gardiner MD Schedule an appointment as soon as possible for a visit ORTHO: as needed for any concerns [address]  [address]  [address] [address]  [phone]          Return to ED if worse       DISCHARGE NOTE  11:44 AM  The patient has been re-evaluated and is ready for discharge. Reviewed available results with patient. Counseled pt on diagnosis and care plan. Pt has expressed understanding, and all questions have been answered. Pt agrees with plan and agrees to follow up as recommended, or return to the ED if their symptoms worsen. Discharge instructions have been provided and explained to the pt, along with reasons to return to the ED. Attestations: This note is prepared by Yuridia Garner. Vitor Sanabria, acting as Scribe for Suze Crane. LELA Treadwell: The scribe's documentation has been prepared under my direction and personally reviewed by me in its entirety. I confirm that the note above accurately reflects all work, treatment, procedures, and medical decision making performed by me.

## 2017-09-25 ENCOUNTER — TELEPHONE (OUTPATIENT)
Dept: FAMILY MEDICINE CLINIC | Age: 42
End: 2017-09-25

## 2017-09-25 DIAGNOSIS — M54.2 CERVICALGIA: ICD-10-CM

## 2017-09-25 DIAGNOSIS — R51.9 CHRONIC NONINTRACTABLE HEADACHE, UNSPECIFIED HEADACHE TYPE: ICD-10-CM

## 2017-09-25 DIAGNOSIS — G89.29 CHRONIC NONINTRACTABLE HEADACHE, UNSPECIFIED HEADACHE TYPE: ICD-10-CM

## 2017-09-25 RX ORDER — BUTALBITAL, ACETAMINOPHEN AND CAFFEINE 50; 325; 40 MG/1; MG/1; MG/1
1 TABLET ORAL
Qty: 60 TAB | Refills: 0 | OUTPATIENT
Start: 2017-09-25 | End: 2017-10-30 | Stop reason: SDUPTHER

## 2017-09-25 NOTE — TELEPHONE ENCOUNTER
----- Message from Neisha Hernandez sent at 9/25/2017 10:07 AM EDT -----  Regarding: Dr. Daniella Chapman refill  Contact: [phone]  Pt is requesting a refill on med Fioricet.  Pt is out of med and use [facility] on [address]. Pt's best contact number is [phone]

## 2017-09-28 ENCOUNTER — TELEPHONE (OUTPATIENT)
Dept: FAMILY MEDICINE CLINIC | Age: 42
End: 2017-09-28

## 2017-09-28 NOTE — TELEPHONE ENCOUNTER
Pt came in office after leaving a message on Monday. She is out of [employer] -40.  Pt # [phone]

## 2017-09-28 NOTE — TELEPHONE ENCOUNTER
Addition to previous message the Pt Dulce Ponce will be going out of town on Friday Sept 29, 2017 and currently is having a migraine (per Pt)

## 2017-10-30 DIAGNOSIS — G89.29 CHRONIC NONINTRACTABLE HEADACHE, UNSPECIFIED HEADACHE TYPE: ICD-10-CM

## 2017-10-30 DIAGNOSIS — R51.9 CHRONIC NONINTRACTABLE HEADACHE, UNSPECIFIED HEADACHE TYPE: ICD-10-CM

## 2017-10-30 DIAGNOSIS — M54.2 CERVICALGIA: ICD-10-CM

## 2017-10-30 RX ORDER — BUTALBITAL, ACETAMINOPHEN AND CAFFEINE 50; 325; 40 MG/1; MG/1; MG/1
1 TABLET ORAL
Qty: 60 TAB | Refills: 0 | Status: SHIPPED | OUTPATIENT
Start: 2017-10-30 | End: 2017-11-27 | Stop reason: SDUPTHER

## 2017-10-30 NOTE — TELEPHONE ENCOUNTER
----- Message from Christina Welch sent at 10/30/2017  9:49 AM EDT -----  Regarding: [place]/Rx  Pt is requesting a Rx for Fioricet called to [facility]. Pt has no medication left. Pts number is  [phone].

## 2017-11-27 DIAGNOSIS — M54.2 CERVICALGIA: ICD-10-CM

## 2017-11-27 DIAGNOSIS — R51.9 CHRONIC NONINTRACTABLE HEADACHE, UNSPECIFIED HEADACHE TYPE: ICD-10-CM

## 2017-11-27 DIAGNOSIS — G89.29 CHRONIC NONINTRACTABLE HEADACHE, UNSPECIFIED HEADACHE TYPE: ICD-10-CM

## 2017-11-27 RX ORDER — AMOXICILLIN AND CLAVULANATE POTASSIUM 875; 125 MG/1; MG/1
1 TABLET, FILM COATED ORAL EVERY 12 HOURS
Qty: 14 TAB | Refills: 0 | Status: SHIPPED | OUTPATIENT
Start: 2017-11-27 | End: 2017-12-04 | Stop reason: ALTCHOICE

## 2017-11-27 RX ORDER — PREDNISONE 10 MG/1
10 TABLET ORAL SEE ADMIN INSTRUCTIONS
Qty: 21 TAB | Refills: 0 | Status: SHIPPED | OUTPATIENT
Start: 2017-11-27 | End: 2017-12-04 | Stop reason: ALTCHOICE

## 2017-11-27 RX ORDER — BUTALBITAL, ACETAMINOPHEN AND CAFFEINE 50; 325; 40 MG/1; MG/1; MG/1
1 TABLET ORAL
Qty: 60 TAB | Refills: 0 | Status: SHIPPED | OUTPATIENT
Start: 2017-11-27 | End: 2018-01-09 | Stop reason: SDUPTHER

## 2017-11-27 NOTE — TELEPHONE ENCOUNTER
Female, 40 y.o., 1975  Weight:   136 lb 3.9 oz (61.8 kg)  Phone:   [phone]  PCP:   Yayo Woodard MD  MRN:   [id number]  MyChart: Active  Next Appt:   12/04/2017    Non-Urgent Medical Question        From     Kaitlin Morales Alan      To     [facility] Nurse Pool      Sent     11/27/2017  9:28 AM            Good morning Dr. Janes Harman. I started coughing two days ago and I want to get ahead of this. I need an antibiotic and I don't have an appointment with you until Dec 4th. I also want to talk to you about my anxiety and controlling it. I also pulled the muscles in my back and chest after lifting a 50 lb box of paper. I am currently taking 600 mg of advil every 8 hours. Thank you for your help and I will see you Dec. 4th.                                                                 Cleo Marr

## 2017-12-01 RX ORDER — HYDROCODONE POLISTIREX AND CHLORPHENIRAMINE POLISTIREX 10; 8 MG/5ML; MG/5ML
5 SUSPENSION, EXTENDED RELEASE ORAL
Qty: 115 ML | Refills: 0 | OUTPATIENT
Start: 2017-12-01

## 2017-12-04 ENCOUNTER — OFFICE VISIT (OUTPATIENT)
Dept: FAMILY MEDICINE CLINIC | Age: 42
End: 2017-12-04

## 2017-12-04 VITALS
RESPIRATION RATE: 24 BRPM | OXYGEN SATURATION: 99 % | DIASTOLIC BLOOD PRESSURE: 68 MMHG | TEMPERATURE: 98.4 F | SYSTOLIC BLOOD PRESSURE: 108 MMHG | BODY MASS INDEX: 25.03 KG/M2 | HEIGHT: 62 IN | HEART RATE: 92 BPM | WEIGHT: 136 LBS

## 2017-12-04 DIAGNOSIS — J20.9 ACUTE BRONCHITIS WITH ASTHMA: ICD-10-CM

## 2017-12-04 DIAGNOSIS — J45.901 ASTHMATIC BRONCHITIS WITH ACUTE EXACERBATION, UNSPECIFIED ASTHMA SEVERITY, UNSPECIFIED WHETHER PERSISTENT: Primary | ICD-10-CM

## 2017-12-04 DIAGNOSIS — J45.31 ASTHMATIC BRONCHITIS, MILD PERSISTENT, WITH ACUTE EXACERBATION: ICD-10-CM

## 2017-12-04 DIAGNOSIS — J45.909 ACUTE BRONCHITIS WITH ASTHMA: ICD-10-CM

## 2017-12-04 RX ORDER — ALBUTEROL SULFATE 90 UG/1
2 AEROSOL, METERED RESPIRATORY (INHALATION)
Qty: 1 INHALER | Refills: 3 | Status: SHIPPED | OUTPATIENT
Start: 2017-12-04 | End: 2020-03-19 | Stop reason: SDUPTHER

## 2017-12-04 RX ORDER — HYDROCODONE POLISTIREX AND CHLORPHENIRAMINE POLISTIREX 10; 8 MG/5ML; MG/5ML
1 SUSPENSION, EXTENDED RELEASE ORAL
Qty: 115 ML | Refills: 0 | Status: SHIPPED | OUTPATIENT
Start: 2017-12-04 | End: 2022-02-01 | Stop reason: ALTCHOICE

## 2017-12-04 RX ORDER — AZITHROMYCIN 250 MG/1
TABLET, FILM COATED ORAL
Qty: 6 TAB | Refills: 0 | Status: SHIPPED | OUTPATIENT
Start: 2017-12-04 | End: 2018-06-05 | Stop reason: ALTCHOICE

## 2017-12-04 RX ORDER — IPRATROPIUM BROMIDE AND ALBUTEROL SULFATE 2.5; .5 MG/3ML; MG/3ML
3 SOLUTION RESPIRATORY (INHALATION)
Qty: 50 NEBULE | Refills: 3 | Status: SHIPPED | OUTPATIENT
Start: 2017-12-04 | End: 2018-12-18 | Stop reason: SDUPTHER

## 2017-12-04 RX ORDER — HYDROCODONE POLISTIREX AND CHLORPHENIRAMINE POLISTIREX 10; 8 MG/5ML; MG/5ML
5 SUSPENSION, EXTENDED RELEASE ORAL
Qty: 115 ML | Refills: 0 | OUTPATIENT
Start: 2017-12-04

## 2017-12-04 NOTE — PROGRESS NOTES
Chief Complaint   Patient presents with    Cold Symptoms     Pt has cough, congestion, SOB, runny nose and fever.

## 2017-12-04 NOTE — MR AVS SNAPSHOT
Visit Information Date & Time Provider Department Dept. Phone Encounter #  
 12/4/2017 11:45 AM Jorge Hernandez [phone] [id number] Follow-up Instructions Return if symptoms worsen or fail to improve. Upcoming Health Maintenance Date Due  
 PAP AKA CERVICAL CYTOLOGY 3/5/1997 Influenza Age 5 to Adult 8/1/2017 DTaP/Tdap/Td series (2 - Td) 6/19/2027 Allergies as of 12/4/2017  Review Complete On: 12/4/2017 By: Roxane Whittaker Severity Noted Reaction Type Reactions Adhesive Tape-silicones  10/30/0613    Other (comments) Pt states this burns her skin. Current Immunizations  Reviewed on 6/19/2017 Name Date Tdap 6/19/2017 Not reviewed this visit You Were Diagnosed With   
  
 Codes Comments Asthmatic bronchitis with acute exacerbation, unspecified asthma severity, unspecified whether persistent    -  Primary ICD-10-CM: J45.901 ICD-9-CM: 836.24 Asthmatic bronchitis, mild persistent, with acute exacerbation     ICD-10-CM: J45.31 
ICD-9-CM: 493.92 Acute bronchitis with asthma     ICD-10-CM: J20.9, J45.909 ICD-9-CM: 466.0, 493.90 Vitals BP Pulse Temp Resp Height(growth percentile) Weight(growth percentile) 108/68 (BP 1 Location: Left arm, BP Patient Position: Sitting) 92 98.4 °F (36.9 °C) (Oral) 24 5' 1.5\" (1.562 m) 136 lb (61.7 kg) SpO2 BMI OB Status Smoking Status 99% 25.28 kg/m2 Having regular periods Former Smoker Vitals History BMI and BSA Data Body Mass Index Body Surface Area  
 25.28 kg/m 2 1.64 m 2 Preferred Pharmacy Pharmacy Name Phone [facility] [address] [place] [address] [phone] Your Updated Medication List  
  
   
This list is accurate as of: 12/4/17 12:09 PM.  Always use your most recent med list.  
  
  
  
  
 albuterol 90 mcg/actuation inhaler Commonly known as:  PROVENTIL HFA Take 2 Puffs by inhalation every four (4) hours as needed for Wheezing. albuterol-ipratropium 2.5 mg-0.5 mg/3 ml Nebu Commonly known as:  DUO-NEB  
3 mL by Nebulization route every six (6) hours as needed. aspirin 81 mg chewable tablet Take 81 mg by mouth daily. azithromycin 250 mg tablet Commonly known as:  Michael Cruz Take 2 tablets today, then take 1 tablet daily  
  
 butalbital-acetaminophen-caffeine -40 mg per tablet Commonly known as:  Yaya Lasso Take 1 Tab by mouth every six (6) hours as needed for Pain. Max Daily Amount: 4 Tabs. No more than 15 tabs per week  
  
 chlorpheniramine-HYDROcodone 10-8 mg/5 mL suspension Commonly known as:  Gaile Joy Take 5 mL by mouth every twelve (12) hours as needed for Cough. Max Daily Amount: 10 mL. LOVENOX 40 mg/0.4 mL Generic drug:  enoxaparin 40 mg by SubCUTAneous route daily. multivitamin tablet Commonly known as:  ONE A DAY Take 1 Tab by mouth daily. oxyCODONE-acetaminophen 5-325 mg per tablet Commonly known as:  PERCOCET Take 1 Tab by mouth every four (4) hours as needed for Pain. Prescriptions Printed Refills  
 chlorpheniramine-HYDROcodone (TUSSIONEX) 10-8 mg/5 mL suspension 0 Sig: Take 5 mL by mouth every twelve (12) hours as needed for Cough. Max Daily Amount: 10 mL. Class: Print Route: Oral  
  
Prescriptions Sent to Pharmacy Refills  
 albuterol-ipratropium (DUO-NEB) 2.5 mg-0.5 mg/3 ml nebu 3 Sig: 3 mL by Nebulization route every six (6) hours as needed. Class: Normal  
 Pharmacy: [facility] [address] [place] [address] Ph #: [phone] Route: Nebulization  
 albuterol (PROVENTIL HFA) 90 mcg/actuation inhaler 3 Sig: Take 2 Puffs by inhalation every four (4) hours as needed for Wheezing.   
 Class: Normal  
 Pharmacy: [facility] [address], [facility] [address] Ph #: [phone] Route: Inhalation  
 azithromycin (ZITHROMAX) 250 mg tablet 0 Sig: Take 2 tablets today, then take 1 tablet daily Class: Normal  
 Pharmacy: [facility] [address] [place], [place] [address]e, [address] Ph #: [phone] Follow-up Instructions Return if symptoms worsen or fail to improve. Introducing [facility]! Dear Carroll Yarbrough: Thank you for requesting a [employer] account. Our records indicate that you already have an active [employer] account. You can access your account anytime at [email or web address] Did you know that you can access your hospital and ER discharge instructions at any time in [employer]? You can also review all of your test results from your hospital stay or ER visit. Additional Information If you have questions, please visit the Frequently Asked Questions section of the [employer] website at [email or web address] Remember, [employer] is NOT to be used for urgent needs. For medical emergencies, dial 911. Now available from your iPhone and Android! Please provide this summary of care documentation to your next provider. Your primary care clinician is listed as Juliette Lopez. If you have any questions after today's visit, please call [phone].

## 2017-12-05 NOTE — PROGRESS NOTES
HISTORY OF PRESENT ILLNESS  Tristan Bass is a 40 y.o. female. f/u seasonal sasthmatic bronchitis ,rxd with antibiotics and prednisone earlier. Finally feeling maite with nebs  Cough   The history is provided by the patient. This is a chronic problem. The problem occurs hourly. The problem has been gradually improving. Associated symptoms include headaches. Pertinent negatives include no shortness of breath. Hoarse    This is a chronic problem. The problem has not changed since onset. Associated symptoms include headaches, rhinorrhea, sinus pain, cough and wheezing. Pertinent negatives include no swollen glands. Review of Systems   Constitutional: Positive for malaise/fatigue. Negative for fever. HENT: Positive for hoarse voice and rhinorrhea. Respiratory: Positive for cough and wheezing. Negative for shortness of breath. Neurological: Positive for headaches. Physical Exam   Constitutional: She appears well-developed and well-nourished. HENT:   Head: Normocephalic and atraumatic. Right Ear: Tympanic membrane and ear canal normal.   Left Ear: Tympanic membrane and ear canal normal.   Nose: Mucosal edema and rhinorrhea present. Mouth/Throat: Posterior oropharyngeal erythema present. Eyes: Conjunctivae are normal. Pupils are equal, round, and reactive to light. Neck: Normal range of motion. Neck supple. Pulmonary/Chest: Effort normal and breath sounds normal.   Abdominal: Soft. Bowel sounds are normal.   Lymphadenopathy:     She has no cervical adenopathy. ASSESSMENT and PLAN  Diagnoses and all orders for this visit:    1. Asthmatic bronchitis with acute exacerbation, unspecified asthma severity, unspecified whether persistent  -     azithromycin (ZITHROMAX) 250 mg tablet; Take 2 tablets today, then take 1 tablet daily  -     chlorpheniramine-HYDROcodone (TUSSIONEX) 10-8 mg/5 mL suspension; Take 5 mL by mouth every twelve (12) hours as needed for Cough.  Max Daily Amount: 10 mL.    2. Asthmatic bronchitis, mild persistent, with acute exacerbation  -     albuterol-ipratropium (DUO-NEB) 2.5 mg-0.5 mg/3 ml nebu; 3 mL by Nebulization route every six (6) hours as needed. 3. Acute bronchitis with asthma  -     albuterol (PROVENTIL HFA) 90 mcg/actuation inhaler; Take 2 Puffs by inhalation every four (4) hours as needed for Wheezing. Follow-up Disposition:  Return if symptoms worsen or fail to improve.

## 2018-01-09 DIAGNOSIS — M54.2 CERVICALGIA: ICD-10-CM

## 2018-01-09 DIAGNOSIS — G89.29 CHRONIC NONINTRACTABLE HEADACHE, UNSPECIFIED HEADACHE TYPE: ICD-10-CM

## 2018-01-09 DIAGNOSIS — R51.9 CHRONIC NONINTRACTABLE HEADACHE, UNSPECIFIED HEADACHE TYPE: ICD-10-CM

## 2018-01-09 RX ORDER — BUTALBITAL, ACETAMINOPHEN AND CAFFEINE 50; 325; 40 MG/1; MG/1; MG/1
TABLET ORAL
Qty: 60 TAB | Refills: 0 | Status: SHIPPED | OUTPATIENT
Start: 2018-01-09 | End: 2018-02-08 | Stop reason: SDUPTHER

## 2018-01-24 DIAGNOSIS — J32.0 MAXILLARY SINUSITIS, UNSPECIFIED CHRONICITY: Primary | ICD-10-CM

## 2018-01-24 RX ORDER — LEVOFLOXACIN 500 MG/1
500 TABLET, FILM COATED ORAL DAILY
Qty: 7 TAB | Refills: 0 | Status: SHIPPED | OUTPATIENT
Start: 2018-01-24 | End: 2018-02-03

## 2018-01-29 ENCOUNTER — TELEPHONE (OUTPATIENT)
Dept: FAMILY MEDICINE CLINIC | Age: 43
End: 2018-01-29

## 2018-01-29 NOTE — TELEPHONE ENCOUNTER
Patients wife calling re: Rocky Green,  states that she thinks he had 2 seizures on Friday he passed out twice and threw up. Patient refused to go to the ER. She states that she was unable to call 911 because she did not have a phone at the time and when he came too, he refused to go to the ER.  Please call to advise  [phone]

## 2018-02-08 DIAGNOSIS — M54.2 CERVICALGIA: ICD-10-CM

## 2018-02-08 DIAGNOSIS — G89.29 CHRONIC NONINTRACTABLE HEADACHE, UNSPECIFIED HEADACHE TYPE: ICD-10-CM

## 2018-02-08 DIAGNOSIS — R51.9 CHRONIC NONINTRACTABLE HEADACHE, UNSPECIFIED HEADACHE TYPE: ICD-10-CM

## 2018-02-08 RX ORDER — BUTALBITAL, ACETAMINOPHEN AND CAFFEINE 50; 325; 40 MG/1; MG/1; MG/1
TABLET ORAL
Qty: 60 TAB | Refills: 0 | Status: SHIPPED | OUTPATIENT
Start: 2018-02-08 | End: 2018-03-07 | Stop reason: SDUPTHER

## 2018-02-19 DIAGNOSIS — J20.9 ACUTE BRONCHITIS, UNSPECIFIED ORGANISM: Primary | ICD-10-CM

## 2018-02-19 DIAGNOSIS — J45.901 ASTHMATIC BRONCHITIS WITH ACUTE EXACERBATION, UNSPECIFIED ASTHMA SEVERITY, UNSPECIFIED WHETHER PERSISTENT: ICD-10-CM

## 2018-02-19 RX ORDER — AZITHROMYCIN 250 MG/1
TABLET, FILM COATED ORAL
Qty: 6 TAB | Refills: 0 | Status: SHIPPED | OUTPATIENT
Start: 2018-02-19 | End: 2018-02-24

## 2018-02-19 RX ORDER — PROMETHAZINE HYDROCHLORIDE AND CODEINE PHOSPHATE 6.25; 1 MG/5ML; MG/5ML
5 SOLUTION ORAL
Qty: 180 ML | Refills: 0 | Status: SHIPPED | OUTPATIENT
Start: 2018-02-19 | End: 2018-12-18 | Stop reason: SDUPTHER

## 2018-02-19 NOTE — TELEPHONE ENCOUNTER
Gema [facility]  Female, 40 y.o., 1975  Weight:   136 lb (61.7 kg)  Phone:   [phone]  PCP:   Anita Serna MD  MRN:   [id number]  MyChart: Active  Next Appt (With Me)  None  Next Appt (Any Provider)  None    RE: Non-Urgent Medical Question        From     Alida Phillips      To     [facility] Nurse Pool      Sent     2/19/2018  9:24 AM            Headache, runny nose, weak, coughing, no fever              Previous Messages       ----- Message -----   From: Raegan Beck LPN   Sent: 9/02/6986  9:21 AM EST   To: Alida Phillips   Subject: RE: Non-Urgent Medical Question     What symptoms did Mr. Meg Balderas have?     ----- Message -----   Ana Reasoner From: Alida Phillips      Sent: 2/19/2018  9:19 AM EST        To: Anita Serna MD   Subject: Non-Urgent Medical Question     Is there a way Dr. Conrado Rodriguez can call in an antibiotic for me.  I have the same symptoms Kaushik had last week.            Encounter Messages        Read Composed From To Subject       Y 2/19/2018  9:24 AM Alida Serna MD RE: Non-Urgent Medical Question       Y 2/19/2018  9:21 AM JENSEN Magdaleno RE: Non-Urgent Medical Question       Y 2/19/2018  9:19 AM Alida Serna MD Non-Urgent Medical Question

## 2018-02-19 NOTE — TELEPHONE ENCOUNTER
Patient is requesting antibiotic for H/A, runny nose, cough.  She has used Zithromax and promethazine with codeine

## 2018-03-07 DIAGNOSIS — R51.9 CHRONIC NONINTRACTABLE HEADACHE, UNSPECIFIED HEADACHE TYPE: ICD-10-CM

## 2018-03-07 DIAGNOSIS — M54.2 CERVICALGIA: ICD-10-CM

## 2018-03-07 DIAGNOSIS — G89.29 CHRONIC NONINTRACTABLE HEADACHE, UNSPECIFIED HEADACHE TYPE: ICD-10-CM

## 2018-03-07 RX ORDER — BUTALBITAL, ACETAMINOPHEN AND CAFFEINE 50; 325; 40 MG/1; MG/1; MG/1
TABLET ORAL
Qty: 60 TAB | Refills: 0 | Status: SHIPPED | OUTPATIENT
Start: 2018-03-07 | End: 2018-04-06 | Stop reason: SDUPTHER

## 2018-04-06 DIAGNOSIS — M54.2 CERVICALGIA: ICD-10-CM

## 2018-04-06 DIAGNOSIS — R51.9 CHRONIC NONINTRACTABLE HEADACHE, UNSPECIFIED HEADACHE TYPE: ICD-10-CM

## 2018-04-06 DIAGNOSIS — G89.29 CHRONIC NONINTRACTABLE HEADACHE, UNSPECIFIED HEADACHE TYPE: ICD-10-CM

## 2018-04-06 RX ORDER — BUTALBITAL, ACETAMINOPHEN AND CAFFEINE 50; 325; 40 MG/1; MG/1; MG/1
TABLET ORAL
Qty: 60 TAB | Refills: 0 | Status: SHIPPED | OUTPATIENT
Start: 2018-04-06 | End: 2018-05-07 | Stop reason: SDUPTHER

## 2018-05-07 DIAGNOSIS — R51.9 CHRONIC NONINTRACTABLE HEADACHE, UNSPECIFIED HEADACHE TYPE: ICD-10-CM

## 2018-05-07 DIAGNOSIS — M54.2 CERVICALGIA: ICD-10-CM

## 2018-05-07 DIAGNOSIS — G89.29 CHRONIC NONINTRACTABLE HEADACHE, UNSPECIFIED HEADACHE TYPE: ICD-10-CM

## 2018-05-07 RX ORDER — BUTALBITAL, ACETAMINOPHEN AND CAFFEINE 50; 325; 40 MG/1; MG/1; MG/1
TABLET ORAL
Qty: 60 TAB | Refills: 0 | Status: SHIPPED | OUTPATIENT
Start: 2018-05-07 | End: 2018-06-04 | Stop reason: SDUPTHER

## 2018-05-09 ENCOUNTER — HOSPITAL ENCOUNTER (OUTPATIENT)
Dept: LAB | Age: 43
Discharge: HOME | End: 2018-05-09

## 2018-05-18 ENCOUNTER — TELEPHONE (OUTPATIENT)
Dept: FAMILY MEDICINE CLINIC | Age: 43
End: 2018-05-18

## 2018-05-18 NOTE — TELEPHONE ENCOUNTER
Tariq Hewitt [facility]  Female, 44 y.o., 1975  Weight:   136 lb (61.7 kg)  Phone:   [phone]  PCP:   Luciana Prieto MD  MRN:   [id number]  MyChart: Active  Next Appt (With Me)  None  Next Appt (Any Provider)  None    Non-Urgent Medical Question        From     Jase Phillips      To     [facility] Nurse Pool      Sent     5/18/2018  8:44 AM            Good morning. I have a question this morning that I hope you can answer. I had a full hysterectomy on May 9th. The last two days I have had a migraine that will not go away no matter what I do. I have been taking my fioricet,  Excedrin migraine, and putting ice on the back of my head.  Is there anything else I can do?              Encounter Messages        Read Composed From To Subject       Y 5/18/2018  8:44 AM Jase Prieto MD Non-Urgent Medical Question

## 2018-06-04 DIAGNOSIS — G89.29 CHRONIC NONINTRACTABLE HEADACHE, UNSPECIFIED HEADACHE TYPE: ICD-10-CM

## 2018-06-04 DIAGNOSIS — M54.2 CERVICALGIA: ICD-10-CM

## 2018-06-04 DIAGNOSIS — R51.9 CHRONIC NONINTRACTABLE HEADACHE, UNSPECIFIED HEADACHE TYPE: ICD-10-CM

## 2018-06-05 ENCOUNTER — OFFICE VISIT (OUTPATIENT)
Dept: FAMILY MEDICINE CLINIC | Age: 43
End: 2018-06-05

## 2018-06-05 VITALS
TEMPERATURE: 97.9 F | DIASTOLIC BLOOD PRESSURE: 84 MMHG | BODY MASS INDEX: 25.03 KG/M2 | SYSTOLIC BLOOD PRESSURE: 112 MMHG | WEIGHT: 136 LBS | HEART RATE: 95 BPM | HEIGHT: 62 IN | RESPIRATION RATE: 22 BRPM | OXYGEN SATURATION: 98 %

## 2018-06-05 DIAGNOSIS — R30.0 DYSURIA: Primary | ICD-10-CM

## 2018-06-05 LAB
BILIRUB UR QL STRIP: NEGATIVE
GLUCOSE UR-MCNC: NEGATIVE MG/DL
KETONES P FAST UR STRIP-MCNC: NEGATIVE MG/DL
PH UR STRIP: 6 [PH] (ref 4.6–8)
PROT UR QL STRIP: NEGATIVE
SP GR UR STRIP: 1.01 (ref 1–1.03)
UA UROBILINOGEN AMB POC: NORMAL (ref 0.2–1)
URINALYSIS CLARITY POC: CLEAR
URINALYSIS COLOR POC: NORMAL
URINE BLOOD POC: NORMAL
URINE LEUKOCYTES POC: NEGATIVE
URINE NITRITES POC: NEGATIVE

## 2018-06-05 RX ORDER — BUTALBITAL, ACETAMINOPHEN AND CAFFEINE 50; 325; 40 MG/1; MG/1; MG/1
TABLET ORAL
Qty: 60 TAB | Refills: 0 | Status: SHIPPED | OUTPATIENT
Start: 2018-06-05 | End: 2018-07-03 | Stop reason: SDUPTHER

## 2018-06-05 RX ORDER — NITROFURANTOIN 25; 75 MG/1; MG/1
100 CAPSULE ORAL 2 TIMES DAILY
Qty: 6 CAP | Refills: 0 | Status: SHIPPED | OUTPATIENT
Start: 2018-06-05 | End: 2019-05-28 | Stop reason: ALTCHOICE

## 2018-06-05 NOTE — PROGRESS NOTES
Chief Complaint   Patient presents with    Back Pain     Pt having back and lower abdominal pain when urinating.

## 2018-06-06 NOTE — PROGRESS NOTES
HISTORY OF PRESENT ILLNESS  Jeronimo Cesar is a 44 y.o. female. frequency urgency dysuria x 2 days,s/p hyst 1 mo ago  Back Pain    The history is provided by the patient. This is a new problem. The problem has not changed since onset. The problem occurs hourly. The pain is associated with no known injury. Associated symptoms include abdominal pain and dysuria. Pertinent negatives include no fever. Dysuria   The history is provided by the patient. This is a new problem. The problem occurs hourly. Associated symptoms include abdominal pain. Review of Systems   Constitutional: Negative for fever and malaise/fatigue. Gastrointestinal: Positive for abdominal pain. Genitourinary: Positive for difficulty urinating, dysuria and urgency. Musculoskeletal: Positive for back pain. Physical Exam   Constitutional: She appears well-developed and well-nourished. HENT:   Head: Normocephalic and atraumatic. Right Ear: External ear normal.   Left Ear: External ear normal.   Mouth/Throat: Oropharynx is clear and moist.   Eyes: Conjunctivae are normal. Pupils are equal, round, and reactive to light. Pulmonary/Chest: Effort normal and breath sounds normal.   Abdominal: Bowel sounds are normal. She exhibits no distension. There is no tenderness. There is no rebound. ASSESSMENT and PLAN  Diagnoses and all orders for this visit:    1. Dysuria  -     AMB POC URINALYSIS DIP STICK AUTO W/O MICRO  -     nitrofurantoin, macrocrystal-monohydrate, (MACROBID) 100 mg capsule; Take 1 Cap by mouth two (2) times a day. -     CULTURE, URINE      Follow-up Disposition:  Return if symptoms worsen or fail to improve.

## 2018-06-07 LAB — BACTERIA UR CULT: NORMAL

## 2018-06-13 ENCOUNTER — TELEPHONE (OUTPATIENT)
Dept: FAMILY MEDICINE CLINIC | Age: 43
End: 2018-06-13

## 2018-06-13 NOTE — TELEPHONE ENCOUNTER
On 6/5/18 patient was seen by Dr. Quinn Miles, she was given Macrobid BID x 3 days, requesting another antibiotic, she states that she still has the infection.  Telephone number [phone]

## 2018-06-14 DIAGNOSIS — N30.90 CYSTITIS: Primary | ICD-10-CM

## 2018-06-14 RX ORDER — CIPROFLOXACIN 250 MG/1
250 TABLET, FILM COATED ORAL EVERY 12 HOURS
Qty: 10 TAB | Refills: 0 | Status: SHIPPED | OUTPATIENT
Start: 2018-06-14 | End: 2018-06-19

## 2018-06-14 NOTE — TELEPHONE ENCOUNTER
Anamika Lugo [facility]  Female, 44 y.o., 1975  Weight:   136 lb (61.7 kg)  Phone:   [phone]  PCP:   Hima Rojo MD  MRN:   [id number]  MyChart: Active  Next Appt (With Me)  None  Next Appt (Any Provider)  None    Non-Urgent Medical Question        From     Raghu Shea Alan      To     [facility] Nurse Pool      Sent     6/13/2018  1:46 PM            Is there any way to get an antibiotic.  The last one was for 3 days and I still have the infection.              Encounter Messages        Read Composed From To Subject       Y 6/13/2018  1:46 PM Justina Barron MD Non-Urgent Medical Question

## 2018-07-03 DIAGNOSIS — G89.29 CHRONIC NONINTRACTABLE HEADACHE, UNSPECIFIED HEADACHE TYPE: ICD-10-CM

## 2018-07-03 DIAGNOSIS — R51.9 CHRONIC NONINTRACTABLE HEADACHE, UNSPECIFIED HEADACHE TYPE: ICD-10-CM

## 2018-07-03 DIAGNOSIS — M54.2 CERVICALGIA: ICD-10-CM

## 2018-07-03 RX ORDER — BUTALBITAL, ACETAMINOPHEN AND CAFFEINE 50; 325; 40 MG/1; MG/1; MG/1
TABLET ORAL
Qty: 60 TAB | Refills: 0 | Status: SHIPPED | OUTPATIENT
Start: 2018-07-03 | End: 2018-12-18 | Stop reason: SDUPTHER

## 2018-07-03 RX ORDER — ONDANSETRON 4 MG/1
4 TABLET, ORALLY DISINTEGRATING ORAL
Qty: 10 TAB | Refills: 0 | Status: SHIPPED | OUTPATIENT
Start: 2018-07-03 | End: 2018-09-13 | Stop reason: SDUPTHER

## 2018-07-03 NOTE — TELEPHONE ENCOUNTER
----- Message from Clarice Sushma sent at 7/3/2018  1:29 PM EDT -----  Regarding: Dr Say Loredo  Pt would like to get some Nausea medicine call into [facility], number on file, has bad headaches that is making her sick, pt can be reach at [phone] if you have any questions. Please call back today.

## 2018-07-12 ENCOUNTER — TELEPHONE (OUTPATIENT)
Dept: FAMILY MEDICINE CLINIC | Age: 43
End: 2018-07-12

## 2018-07-12 DIAGNOSIS — G43.909 MIGRAINE WITHOUT STATUS MIGRAINOSUS, NOT INTRACTABLE, UNSPECIFIED MIGRAINE TYPE: Primary | ICD-10-CM

## 2018-07-12 RX ORDER — PROPRANOLOL HYDROCHLORIDE 60 MG/1
60 CAPSULE, EXTENDED RELEASE ORAL DAILY
Qty: 30 CAP | Refills: 2 | Status: SHIPPED | OUTPATIENT
Start: 2018-07-12 | End: 2018-10-13 | Stop reason: SDUPTHER

## 2018-07-25 ENCOUNTER — TELEPHONE (OUTPATIENT)
Dept: FAMILY MEDICINE CLINIC | Age: 43
End: 2018-07-25

## 2018-07-25 NOTE — TELEPHONE ENCOUNTER
Luis Chavez [facility]  Female, 44 y.o., 1975  Weight:   136 lb (61.7 kg)  Phone:   [phone]  PCP:   Arianna Thomson MD  MRN:   [id number]  MyChart: Active  Next Appt (With Me)  None  Next Appt (Any Provider)  None    Prescription Question        From     Nellie Phillips      To     [facility] Nurse Pool      Sent     7/25/2018  8:41 AM            Good morning, I was told to talk to my primary doctor about medicine for panic attacks/anxiety attacks. I am on Zoloft but that is not helping. Is there something else I can take to help.  Whenever we are on highway it is so much worse, bad weather and at night.              Encounter Messages        Read Composed From To Subject       Y 7/25/2018  8:41 AM Jude Thomson MD Prescription Question

## 2018-07-30 ENCOUNTER — TELEPHONE (OUTPATIENT)
Dept: FAMILY MEDICINE CLINIC | Age: 43
End: 2018-07-30

## 2018-07-30 DIAGNOSIS — F41.0 PANIC ATTACKS: Primary | ICD-10-CM

## 2018-07-30 RX ORDER — ALPRAZOLAM 0.25 MG/1
0.25 TABLET ORAL
Qty: 15 TAB | Refills: 0 | Status: SHIPPED | OUTPATIENT
Start: 2018-07-30 | End: 2018-08-29 | Stop reason: SDUPTHER

## 2018-07-30 NOTE — TELEPHONE ENCOUNTER
Brandon Herrera [facility]  Female, 44 y.o., 1975  Weight:   136 lb (61.7 kg)  Phone:   [phone]  PCP:   Marna Fabry, MD  MRN:   [id number]  MyChart: Active  Next Appt (With Me)  None  Next Appt (Any Provider)  None    Prescription Question        From     James Phillips      To     [facility]      Sent     7/28/2018  9:48 AM            I'm still waiting to hear from Dr. Raven Barnes. It has been a few days since I sent last email about medicine to help with panic attacks/ anxiety attacks.  My cell phone number is [phone].              Encounter Messages        Read Composed From To Subject       Y 7/28/2018  9:48 AM Jamie L Withrow Marna Fabry, MD Prescription Question

## 2018-08-20 ENCOUNTER — TELEPHONE (OUTPATIENT)
Dept: FAMILY MEDICINE CLINIC | Age: 43
End: 2018-08-20

## 2018-08-20 NOTE — TELEPHONE ENCOUNTER
Rachele Velazquez [facility]  Female, 44 y.o., 1975  Weight:   136 lb (61.7 kg)  Phone:   [phone]  PCP:   Ricki Judd MD  MRN:   [id number]  MyChart: Active  Next Appt (With Me)  None  Next Appt (Any Provider)  None    Prescription Question        From     Jude Phillips      To     [facility] Nurse Pool      Sent     8/20/2018 11:21 AM            IS THERE A WAY TO UP THE DOSAGE ON MY SERTRALINE? I'M TAKING 50 MG RIGHT NOW AND ITS NOT HELPING MY MOOD SWINGS. I AM FEELING REALLY DOWN RIGHT NOW AND CAN NOT SEEM TO SHAKE IT.  PLEASE HELP!!! THANK YOU.              Encounter Messages        Read Composed From To Subject       Y 8/20/2018 11:21 AM Jude Judd MD Prescription Question

## 2018-08-22 ENCOUNTER — TELEPHONE (OUTPATIENT)
Dept: FAMILY MEDICINE CLINIC | Age: 43
End: 2018-08-22

## 2018-08-22 DIAGNOSIS — G43.909 MIGRAINE SYNDROME: Primary | ICD-10-CM

## 2018-08-22 RX ORDER — DICLOFENAC SODIUM 50 MG/1
50 TABLET, DELAYED RELEASE ORAL
Qty: 30 TAB | Refills: 2 | Status: SHIPPED | OUTPATIENT
Start: 2018-08-22 | End: 2018-10-30 | Stop reason: SDUPTHER

## 2018-08-22 RX ORDER — SERTRALINE HYDROCHLORIDE 100 MG/1
100 TABLET, FILM COATED ORAL DAILY
Qty: 30 TAB | Refills: 3 | Status: SHIPPED | OUTPATIENT
Start: 2018-08-22 | End: 2018-12-18 | Stop reason: SDUPTHER

## 2018-08-22 NOTE — TELEPHONE ENCOUNTER
Sent via my chart  Promise is on back order requesting another medication  -  Telephone number [phone]

## 2018-08-22 NOTE — TELEPHONE ENCOUNTER
Dereck Araiza [facility]  Female, 44 y.o., 1975  Weight:   136 lb (61.7 kg)  Phone:   [phone]  PCP:   Bacilio Benito MD  MRN:   [id number]  MyChart: Active  Next Appt (With Me)  None  Next Appt (Any Provider)  None    Prescription Question        From     Julian Montgomery Alan      To     [facility] Nurse Pool      Sent     8/22/2018  2:54 PM            Is there a different medication I can take for migraines? The one I am on is on back order and I need something now. I am still waiting to hear about the other medicine also.  Cell number [phone] thank you              Encounter Messages        Read Composed From To Subject       Y 8/22/2018  2:54 PM Lukasz Parham MD Prescription Question

## 2018-08-29 DIAGNOSIS — F41.0 PANIC ATTACKS: ICD-10-CM

## 2018-08-29 RX ORDER — ALPRAZOLAM 0.25 MG/1
TABLET ORAL
Qty: 15 TAB | Refills: 0 | Status: SHIPPED | OUTPATIENT
Start: 2018-08-29 | End: 2018-10-02 | Stop reason: SDUPTHER

## 2018-09-13 RX ORDER — ONDANSETRON 4 MG/1
TABLET, ORALLY DISINTEGRATING ORAL
Qty: 10 TAB | Refills: 0 | Status: SHIPPED | OUTPATIENT
Start: 2018-09-13 | End: 2019-07-03 | Stop reason: ALTCHOICE

## 2018-10-02 DIAGNOSIS — F41.0 PANIC ATTACKS: ICD-10-CM

## 2018-10-02 RX ORDER — ALPRAZOLAM 0.25 MG/1
TABLET ORAL
Qty: 15 TAB | Refills: 0 | Status: SHIPPED | OUTPATIENT
Start: 2018-10-02 | End: 2018-10-30 | Stop reason: SDUPTHER

## 2018-10-13 DIAGNOSIS — G43.909 MIGRAINE WITHOUT STATUS MIGRAINOSUS, NOT INTRACTABLE, UNSPECIFIED MIGRAINE TYPE: ICD-10-CM

## 2018-10-14 RX ORDER — PROPRANOLOL HYDROCHLORIDE 60 MG/1
CAPSULE, EXTENDED RELEASE ORAL
Qty: 30 CAP | Refills: 0 | Status: SHIPPED | OUTPATIENT
Start: 2018-10-14 | End: 2019-06-14 | Stop reason: CLARIF

## 2018-10-30 DIAGNOSIS — G43.909 MIGRAINE SYNDROME: ICD-10-CM

## 2018-10-30 DIAGNOSIS — F41.0 PANIC ATTACKS: ICD-10-CM

## 2018-10-31 RX ORDER — DICLOFENAC SODIUM 50 MG/1
TABLET, DELAYED RELEASE ORAL
Qty: 30 TAB | Refills: 0 | Status: SHIPPED | OUTPATIENT
Start: 2018-10-31 | End: 2019-06-14 | Stop reason: CLARIF

## 2018-10-31 RX ORDER — ALPRAZOLAM 0.25 MG/1
TABLET ORAL
Qty: 15 TAB | Refills: 0 | Status: SHIPPED | OUTPATIENT
Start: 2018-10-31 | End: 2018-11-29 | Stop reason: SDUPTHER

## 2018-12-09 ENCOUNTER — PATIENT MESSAGE (OUTPATIENT)
Dept: FAMILY MEDICINE CLINIC | Age: 43
End: 2018-12-09

## 2018-12-09 DIAGNOSIS — J20.9 ACUTE BRONCHITIS, UNSPECIFIED ORGANISM: ICD-10-CM

## 2018-12-10 RX ORDER — PREDNISONE 10 MG/1
TABLET ORAL
Qty: 21 TAB | Refills: 0 | Status: CANCELLED | OUTPATIENT
Start: 2018-12-10

## 2018-12-10 RX ORDER — PROMETHAZINE HYDROCHLORIDE AND CODEINE PHOSPHATE 6.25; 1 MG/5ML; MG/5ML
5 SOLUTION ORAL
Qty: 180 ML | Refills: 0 | Status: CANCELLED | OUTPATIENT
Start: 2018-12-10

## 2018-12-18 DIAGNOSIS — J20.9 ACUTE BRONCHITIS, UNSPECIFIED ORGANISM: ICD-10-CM

## 2018-12-18 DIAGNOSIS — R51.9 CHRONIC NONINTRACTABLE HEADACHE, UNSPECIFIED HEADACHE TYPE: ICD-10-CM

## 2018-12-18 DIAGNOSIS — J45.31 ASTHMATIC BRONCHITIS, MILD PERSISTENT, WITH ACUTE EXACERBATION: ICD-10-CM

## 2018-12-18 DIAGNOSIS — G89.29 CHRONIC NONINTRACTABLE HEADACHE, UNSPECIFIED HEADACHE TYPE: ICD-10-CM

## 2018-12-18 DIAGNOSIS — M54.2 CERVICALGIA: ICD-10-CM

## 2018-12-18 DIAGNOSIS — G43.909 MIGRAINE SYNDROME: ICD-10-CM

## 2018-12-18 RX ORDER — SERTRALINE HYDROCHLORIDE 100 MG/1
100 TABLET, FILM COATED ORAL DAILY
Qty: 30 TAB | Refills: 3 | Status: SHIPPED | OUTPATIENT
Start: 2018-12-18 | End: 2019-03-18 | Stop reason: SDUPTHER

## 2018-12-18 RX ORDER — BUTALBITAL, ACETAMINOPHEN AND CAFFEINE 50; 325; 40 MG/1; MG/1; MG/1
1 TABLET ORAL
Qty: 60 TAB | Refills: 0 | Status: SHIPPED | OUTPATIENT
Start: 2018-12-18 | End: 2019-02-06 | Stop reason: SDUPTHER

## 2018-12-18 RX ORDER — PROMETHAZINE HYDROCHLORIDE AND CODEINE PHOSPHATE 6.25; 1 MG/5ML; MG/5ML
5 SOLUTION ORAL
Qty: 180 ML | Refills: 0 | Status: SHIPPED | OUTPATIENT
Start: 2018-12-18 | End: 2019-02-22 | Stop reason: SDUPTHER

## 2018-12-19 RX ORDER — IPRATROPIUM BROMIDE AND ALBUTEROL SULFATE 2.5; .5 MG/3ML; MG/3ML
SOLUTION RESPIRATORY (INHALATION)
Qty: 180 ML | Refills: 0 | Status: SHIPPED | OUTPATIENT
Start: 2018-12-19

## 2019-01-02 ENCOUNTER — TELEPHONE (OUTPATIENT)
Dept: FAMILY MEDICINE CLINIC | Age: 44
End: 2019-01-02

## 2019-01-02 DIAGNOSIS — F41.9 ANXIETY DISORDER, UNSPECIFIED TYPE: Primary | ICD-10-CM

## 2019-01-02 DIAGNOSIS — F41.0 PANIC ATTACKS: ICD-10-CM

## 2019-01-02 RX ORDER — ALPRAZOLAM 0.25 MG/1
0.25 TABLET ORAL
Qty: 50 TAB | Refills: 1 | Status: SHIPPED | OUTPATIENT
Start: 2019-01-02 | End: 2019-03-22 | Stop reason: SDUPTHER

## 2019-01-02 NOTE — TELEPHONE ENCOUNTER
Sana Torrez [facility]  Female, 44 y.o., 1975  Weight:   136 lb (61.7 kg)  Phone:   [phone] (M) . .. PCP:   Yg Leon MD  MRN:   [id number]  MyChart: Active  Next Appt (With Me)  None  Next Appt (Any Provider)  None     Prescription Question     From  [facility] To  [facility] Nurse Pool Sent  1/2/2019 11:35 AM   ----- Message from [address] [place] [address] [address], Generic sent at 1/2/2019 11:35 AM EST -----     Need medicine for anxiety but need more than 15 pills. 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     Encounter Messages     Read Composed From To Subject   Y 1/2/2019 11:35 AM Sharlene Ojeda MD Prescription Question

## 2019-01-08 ENCOUNTER — OFFICE VISIT (OUTPATIENT)
Dept: FAMILY MEDICINE CLINIC | Age: 44
End: 2019-01-08

## 2019-01-08 VITALS
OXYGEN SATURATION: 98 % | TEMPERATURE: 98 F | WEIGHT: 148 LBS | DIASTOLIC BLOOD PRESSURE: 72 MMHG | HEIGHT: 62 IN | SYSTOLIC BLOOD PRESSURE: 132 MMHG | HEART RATE: 83 BPM | BODY MASS INDEX: 27.23 KG/M2 | RESPIRATION RATE: 22 BRPM

## 2019-01-08 DIAGNOSIS — S63.501A WRIST SPRAIN, RIGHT, INITIAL ENCOUNTER: Primary | ICD-10-CM

## 2019-01-08 RX ORDER — TRAMADOL HYDROCHLORIDE 50 MG/1
50 TABLET ORAL
Qty: 20 TAB | Refills: 0 | Status: SHIPPED | OUTPATIENT
Start: 2019-01-08 | End: 2019-06-14 | Stop reason: CLARIF

## 2019-01-08 NOTE — PROGRESS NOTES
HISTORY OF PRESENT ILLNESS Evangelina Best is a 44 y.o. female. fell 5 days ago injuring her rt wrist,little immprovement since Wrist Injury The history is provided by the patient. This is a new problem. The current episode started more than 2 days ago. The problem occurs hourly. The problem has not changed since onset. Review of Systems Constitutional: Negative for fever and malaise/fatigue. Musculoskeletal: Positive for joint pain. Physical Exam  
Constitutional: She appears well-developed. HENT:  
Head: Normocephalic and atraumatic. Right Ear: External ear normal.  
Left Ear: External ear normal.  
Mouth/Throat: Oropharynx is clear and moist.  
Musculoskeletal:  
     Right wrist: She exhibits decreased range of motion, tenderness, bony tenderness and swelling. Skin: Skin is warm and dry. ASSESSMENT and PLAN Diagnoses and all orders for this visit: 
 
1. Wrist sprain, right, initial encounter,recommend ice ,ace,rest;call prn 
-     XR WRIST RT AP/LAT/OBL MIN 3V; Future 
-     traMADol (ULTRAM) 50 mg tablet; Take 1 Tab by mouth every six (6) hours as needed for Pain. Max Daily Amount: 200 mg. Follow-up Disposition: 
Return if symptoms worsen or fail to improve.

## 2019-02-06 DIAGNOSIS — M54.2 CERVICALGIA: ICD-10-CM

## 2019-02-06 DIAGNOSIS — R51.9 CHRONIC NONINTRACTABLE HEADACHE, UNSPECIFIED HEADACHE TYPE: ICD-10-CM

## 2019-02-06 DIAGNOSIS — G89.29 CHRONIC NONINTRACTABLE HEADACHE, UNSPECIFIED HEADACHE TYPE: ICD-10-CM

## 2019-02-06 RX ORDER — BUTALBITAL, ACETAMINOPHEN AND CAFFEINE 50; 325; 40 MG/1; MG/1; MG/1
1 TABLET ORAL
Qty: 60 TAB | Refills: 0 | Status: SHIPPED | OUTPATIENT
Start: 2019-02-06 | End: 2019-03-07 | Stop reason: SDUPTHER

## 2019-02-06 NOTE — TELEPHONE ENCOUNTER
Last Visit: 1/8  Next Appt: None  Previous Refill Encounter: 12/18-60+0    Requested Prescriptions     Pending Prescriptions Disp Refills    butalbital-acetaminophen-caffeine (FIORICET, ESGIC) -40 mg per tablet 60 Tab 0     Sig: Take 1 Tab by mouth every six (6) hours as needed for Pain.

## 2019-02-22 DIAGNOSIS — J20.9 ACUTE BRONCHITIS, UNSPECIFIED ORGANISM: ICD-10-CM

## 2019-02-22 RX ORDER — PROMETHAZINE HYDROCHLORIDE AND CODEINE PHOSPHATE 6.25; 1 MG/5ML; MG/5ML
5 SOLUTION ORAL
Qty: 120 ML | Refills: 0 | Status: SHIPPED | OUTPATIENT
Start: 2019-02-22 | End: 2019-06-10 | Stop reason: SDUPTHER

## 2019-02-22 NOTE — TELEPHONE ENCOUNTER
Lindsay [facility]  Female, 44 y.o., 1975  Weight:   148 lb (67.1 kg)  Phone:   [phone] (M) . .. PCP:   Samira Golden MD  MRN:   [id number]  MyChart: Active  Next Appt (With Me)  None  Next Appt (Any Provider)  None     Prescription Question     From  Magdalena Choi To  [facility] Nurse Pool Sent  2/22/2019  1:42 PM   ----- Message from [address] [place] [address] [address], Generic sent at 2/22/2019  1:42 PM EST -----     Good afternoon. Is there any way to call some cough medicine in? I have been taking over the counter but it's not working. I also have halls in my mouth all the time. I am having to do breathing treatments now. Thank you for your help with this.  Aletha Paz    Encounter Messages     Read Composed From To Subject   Y 2/22/2019  1:42 PM Say Luna MD Prescription Question

## 2019-02-25 RX ORDER — PREDNISONE 10 MG/1
10 TABLET ORAL 2 TIMES DAILY
Qty: 10 TAB | Refills: 0 | Status: SHIPPED | OUTPATIENT
Start: 2019-02-25 | End: 2019-05-13 | Stop reason: ALTCHOICE

## 2019-02-25 RX ORDER — AZITHROMYCIN 250 MG/1
TABLET, FILM COATED ORAL
Qty: 6 TAB | Refills: 0 | Status: SHIPPED | OUTPATIENT
Start: 2019-02-25 | End: 2019-03-02

## 2019-02-25 NOTE — TELEPHONE ENCOUNTER
Lani Chan [facility]  Female, 44 y.o., 1975  Weight:   148 lb (67.1 kg)  Phone:   [phone] (M) . .. PCP:   Gianna Mayes MD  MRN:   [id number]  MyChart: Active  Next Appt (With Me)  None  Next Appt (Any Provider)  None     Non-Urgent Medical Question     From  Victor Manuel Qureshi To  [facility] Nurse Pool Sent  2/23/2019 12:59 PM   ----- Message from [address] [place] [address] [address], Generic sent at 2/23/2019 12:59 PM EST -----     Never mind on prescription I got it from [employer]. Thank you. I will need the antibiotic and steroid to get rid of this bronchitis.     Encounter Messages     Read Composed From To Subject   Y 2/23/2019 12:59 PM Rachel Tam MD Non-Urgent Medical Question

## 2019-03-07 DIAGNOSIS — R51.9 CHRONIC NONINTRACTABLE HEADACHE, UNSPECIFIED HEADACHE TYPE: ICD-10-CM

## 2019-03-07 DIAGNOSIS — M54.2 CERVICALGIA: ICD-10-CM

## 2019-03-07 DIAGNOSIS — G89.29 CHRONIC NONINTRACTABLE HEADACHE, UNSPECIFIED HEADACHE TYPE: ICD-10-CM

## 2019-03-08 RX ORDER — BUTALBITAL, ACETAMINOPHEN AND CAFFEINE 50; 325; 40 MG/1; MG/1; MG/1
TABLET ORAL
Qty: 60 TAB | Refills: 0 | Status: SHIPPED | OUTPATIENT
Start: 2019-03-08 | End: 2019-04-05 | Stop reason: SDUPTHER

## 2019-03-18 DIAGNOSIS — G43.909 MIGRAINE SYNDROME: ICD-10-CM

## 2019-03-19 RX ORDER — SERTRALINE HYDROCHLORIDE 100 MG/1
TABLET, FILM COATED ORAL
Qty: 30 TAB | Refills: 1 | Status: SHIPPED | OUTPATIENT
Start: 2019-03-19 | End: 2019-05-22 | Stop reason: SDUPTHER

## 2019-03-22 DIAGNOSIS — F41.9 ANXIETY DISORDER, UNSPECIFIED TYPE: ICD-10-CM

## 2019-03-22 DIAGNOSIS — F41.0 PANIC ATTACKS: ICD-10-CM

## 2019-03-25 RX ORDER — ALPRAZOLAM 0.25 MG/1
TABLET ORAL
Qty: 50 TAB | Refills: 1 | Status: SHIPPED | OUTPATIENT
Start: 2019-03-25 | End: 2019-05-14 | Stop reason: SDUPTHER

## 2019-04-05 DIAGNOSIS — G89.29 CHRONIC NONINTRACTABLE HEADACHE, UNSPECIFIED HEADACHE TYPE: ICD-10-CM

## 2019-04-05 DIAGNOSIS — M54.2 CERVICALGIA: ICD-10-CM

## 2019-04-05 DIAGNOSIS — R51.9 CHRONIC NONINTRACTABLE HEADACHE, UNSPECIFIED HEADACHE TYPE: ICD-10-CM

## 2019-04-08 RX ORDER — BUTALBITAL, ACETAMINOPHEN AND CAFFEINE 50; 325; 40 MG/1; MG/1; MG/1
TABLET ORAL
Qty: 60 TAB | Refills: 0 | Status: SHIPPED | OUTPATIENT
Start: 2019-04-08 | End: 2019-05-06 | Stop reason: SDUPTHER

## 2019-04-19 RX ORDER — AZITHROMYCIN 250 MG/1
TABLET, FILM COATED ORAL
Qty: 6 TAB | Refills: 0 | Status: SHIPPED | OUTPATIENT
Start: 2019-04-19 | End: 2019-04-24

## 2019-05-06 DIAGNOSIS — M54.2 CERVICALGIA: ICD-10-CM

## 2019-05-06 DIAGNOSIS — G89.29 CHRONIC NONINTRACTABLE HEADACHE, UNSPECIFIED HEADACHE TYPE: ICD-10-CM

## 2019-05-06 DIAGNOSIS — R51.9 CHRONIC NONINTRACTABLE HEADACHE, UNSPECIFIED HEADACHE TYPE: ICD-10-CM

## 2019-05-06 RX ORDER — BUTALBITAL, ACETAMINOPHEN AND CAFFEINE 50; 325; 40 MG/1; MG/1; MG/1
TABLET ORAL
Qty: 60 TAB | Refills: 0 | Status: SHIPPED | OUTPATIENT
Start: 2019-05-06 | End: 2019-06-08 | Stop reason: SDUPTHER

## 2019-05-13 ENCOUNTER — OFFICE VISIT (OUTPATIENT)
Dept: FAMILY MEDICINE CLINIC | Age: 44
End: 2019-05-13

## 2019-05-13 ENCOUNTER — TELEPHONE (OUTPATIENT)
Dept: FAMILY MEDICINE CLINIC | Age: 44
End: 2019-05-13

## 2019-05-13 VITALS
TEMPERATURE: 98.2 F | OXYGEN SATURATION: 99 % | WEIGHT: 147.2 LBS | BODY MASS INDEX: 27.09 KG/M2 | RESPIRATION RATE: 18 BRPM | HEART RATE: 77 BPM | HEIGHT: 62 IN | SYSTOLIC BLOOD PRESSURE: 110 MMHG | DIASTOLIC BLOOD PRESSURE: 70 MMHG

## 2019-05-13 DIAGNOSIS — Z00.00 ANNUAL PHYSICAL EXAM: Primary | ICD-10-CM

## 2019-05-13 DIAGNOSIS — G89.29 CHRONIC NONINTRACTABLE HEADACHE, UNSPECIFIED HEADACHE TYPE: ICD-10-CM

## 2019-05-13 DIAGNOSIS — J04.0 LARYNGITIS: ICD-10-CM

## 2019-05-13 DIAGNOSIS — G43.909 MIGRAINE SYNDROME: ICD-10-CM

## 2019-05-13 DIAGNOSIS — R51.9 CHRONIC NONINTRACTABLE HEADACHE, UNSPECIFIED HEADACHE TYPE: ICD-10-CM

## 2019-05-13 DIAGNOSIS — J45.31 ASTHMATIC BRONCHITIS, MILD PERSISTENT, WITH ACUTE EXACERBATION: ICD-10-CM

## 2019-05-13 DIAGNOSIS — N80.9 ENDOMETRIOSIS: ICD-10-CM

## 2019-05-13 LAB
BILIRUB UR QL STRIP: NEGATIVE
GLUCOSE UR-MCNC: NEGATIVE MG/DL
KETONES P FAST UR STRIP-MCNC: NEGATIVE MG/DL
PH UR STRIP: 7 [PH] (ref 4.6–8)
PROT UR QL STRIP: NEGATIVE
SP GR UR STRIP: 1.02 (ref 1–1.03)
UA UROBILINOGEN AMB POC: NORMAL (ref 0.2–1)
URINALYSIS CLARITY POC: CLEAR
URINALYSIS COLOR POC: YELLOW
URINE BLOOD POC: NEGATIVE
URINE LEUKOCYTES POC: NEGATIVE
URINE NITRITES POC: NEGATIVE

## 2019-05-13 RX ORDER — PROMETHAZINE HYDROCHLORIDE AND DEXTROMETHORPHAN HYDROBROMIDE 6.25; 15 MG/5ML; MG/5ML
5 SYRUP ORAL
Qty: 180 ML | Refills: 1 | Status: SHIPPED | OUTPATIENT
Start: 2019-05-13 | End: 2019-05-20

## 2019-05-13 RX ORDER — PREDNISONE 10 MG/1
10 TABLET ORAL DAILY
Qty: 6 TAB | Refills: 0 | Status: SHIPPED | OUTPATIENT
Start: 2019-05-13 | End: 2019-06-10 | Stop reason: ALTCHOICE

## 2019-05-13 RX ORDER — BUDESONIDE AND FORMOTEROL FUMARATE DIHYDRATE 160; 4.5 UG/1; UG/1
2 AEROSOL RESPIRATORY (INHALATION) 2 TIMES DAILY
Qty: 1 INHALER | Refills: 5 | Status: SHIPPED | OUTPATIENT
Start: 2019-05-13 | End: 2019-06-14 | Stop reason: CLARIF

## 2019-05-13 NOTE — PROGRESS NOTES
Chief Complaint Patient presents with  Complete Physical  
 Allergies  Sore Throat  
  x 2 weeks 1. Have you been to the ER, urgent care clinic since your last visit? Hospitalized since your last visit? Hysterectomy May 2018  [place] 2. Have you seen or consulted any other health care providers outside of the [address] [place] [address] since your last visit? Include any pap smears or colon screening. No  
 
Health Maintenance Due Topic Date Due  Pneumococcal 0-64 years (1 of 1 - PPSV23) 03/05/1982  PAP AKA CERVICAL CYTOLOGY  03/05/1997

## 2019-05-13 NOTE — PROGRESS NOTES
Subjective:  
38 y.o. female for Well Woman Check. Her gyne and breast care is done elsewhere by her Ob-Gyne physician. Patient Active Problem List  
Diagnosis Code  Intractable migraine with aura with status migrainosus G43. 111  
 Subacute maxillary sinusitis J01.00 Patient Active Problem List  
 Diagnosis Date Noted  Intractable migraine with aura with status migrainosus 03/24/2016  Subacute maxillary sinusitis 03/24/2016 Current Outpatient Medications Medication Sig Dispense Refill  butalbital-acetaminophen-caffeine (FIORICET, ESGIC) -40 mg per tablet TAKE 1 TABLET BY MOUTH EVERY 6 HOURS AS NEEDED FOR PAIN 60 Tab 0  ALPRAZolam (XANAX) 0.25 mg tablet TAKE 1 TABLET BY MOUTH THREE TIMES A DAY AS NEEDED FOR ANXIETY 50 Tab 1  
 sertraline (ZOLOFT) 100 mg tablet TAKE 1 TABLET BY MOUTH ONCE DAILY 30 Tab 1  
 albuterol-ipratropium (DUO-NEB) 2.5 mg-0.5 mg/3 ml nebu USE 1 VIAL VIA NEBULIZER EVERY 6 HOURS AS NEEDED 180 mL 0  
 ondansetron (ZOFRAN ODT) 4 mg disintegrating tablet DISSOLVE 1 TABLET ON THE TONGUE EVERY 8 HOURS AS NEEDED FOR NAUSEA 10 Tab 0  
 albuterol (PROVENTIL HFA) 90 mcg/actuation inhaler Take 2 Puffs by inhalation every four (4) hours as needed for Wheezing. 1 Inhaler 3  promethazine-codeine (PHENERGAN WITH CODEINE) 6.25-10 mg/5 mL syrup Take 5 mL by mouth four (4) times daily as needed for Cough. Max Daily Amount: 20 mL. 120 mL 0  
 traMADol (ULTRAM) 50 mg tablet Take 1 Tab by mouth every six (6) hours as needed for Pain. Max Daily Amount: 200 mg. 20 Tab 0  
 diclofenac EC (VOLTAREN) 50 mg EC tablet TAKE 1 TABLET BY MOUTH THREE TIMES DAILY WITH FOOD AS NEEDED 30 Tab 0  propranolol LA (INDERAL LA) 60 mg SR capsule TAKE 1 CAPSULE BY MOUTH DAILY 30 Cap 0  
 cgidpqo-qtuarvdat-mldkcytuwjif (MIDRIN) -325 mg capsule 3 times daily as needed for migraine 50 Cap 2  
 nitrofurantoin, macrocrystal-monohydrate, (MACROBID) 100 mg capsule Take 1 Cap by mouth two (2) times a day. 6 Cap 0  chlorpheniramine-HYDROcodone (TUSSIONEX) 10-8 mg/5 mL suspension Take 5 mL by mouth every twelve (12) hours as needed for Cough. Max Daily Amount: 10 mL. 115 mL 0  
 multivitamin (ONE A DAY) tablet Take 1 Tab by mouth daily.  aspirin 81 mg chewable tablet Take 81 mg by mouth daily. Allergies Allergen Reactions  Adhesive Tape-Silicones Other (comments) Pt states this burns her skin. Past Medical History:  
Diagnosis Date  Asthma  Factor V deficiency ([address]  Heart rate fast   
 Migraine Past Surgical History:  
Procedure Laterality Date  ABDOMEN SURGERY PROC UNLISTED    
 laproscopy removed adhesions on colon  DILATION AND CURETTAGE    
 HX APPENDECTOMY  HX  SECTION    
 HX HYSTERECTOMY  2018  HX TUBAL LIGATION  c sec Family History Problem Relation Age of Onset  Diabetes Mother  Alzheimer Mother  Hypertension Mother  High Cholesterol Mother  Cancer Father  Diabetes Sister  No Known Problems Brother  Immunodeficiency Sister  No Known Problems Sister Social History Tobacco Use  Smoking status: Former Smoker Packs/day: 0.50 Years: 21.00 Pack years: 10.50 Types: Cigarettes Last attempt to quit: 3/5/2016 Years since quitting: 3.1  Smokeless tobacco: Never Used Substance Use Topics  Alcohol use: Yes Alcohol/week: 0.0 oz  
  Comment: Occ Specific concerns today: check up. Review of Systems Constitutional: positive for fatigue Eyes: negative Ears, nose, mouth, throat, and face: negative Respiratory: positive for cough or wheezing Cardiovascular: negative Gastrointestinal: negative Genitourinary:negative Integument/breast: negative Behavioral/Psych: positive for anxiety Objective:  
Blood pressure 110/70, pulse 77, temperature 98.2 °F (36.8 °C), temperature source Oral, resp. rate 18, height 5' 1.5\" (1.562 m), weight 147 lb 3.2 oz (66.8 kg), SpO2 99 %. Physical Examination:  
General appearance - alert, well appearing, and in no distress Mental status - alert, oriented to person, place, and time Eyes - pupils equal and reactive, extraocular eye movements intact Ears - bilateral TM's and external ear canals normal 
Nose - normal and patent, no erythema, discharge or polyps Mouth - mucous membranes moist, pharynx normal without lesions Neck - supple, no significant adenopathy, carotids upstroke normal bilaterally, no bruits, thyroid exam: thyroid is normal in size without nodules or tenderness Chest - clear to auscultation, no wheezes, rales or rhonchi, symmetric air entry Heart - normal rate, regular rhythm, normal S1, S2, no murmurs, rubs, clicks or gallops Abdomen - soft, nontender, nondistended, no masses or organomegaly Breasts - breasts appear normal, no suspicious masses, no skin or nipple changes or axillary nodes Rectal - normal rectal, no masses Neurological - alert, oriented, normal speech, no focal findings or movement disorder noted Extremities - peripheral pulses normal, no pedal edema, no clubbing or cyanosis Assessment/Plan:  
Well Woman Exam 
continue present plan, routine labs ordered, call if any problems Diagnoses and all orders for this visit: 
 
1. Annual physical exam 
-     METABOLIC PANEL, COMPREHENSIVE 
-     LIPID PANEL 
-     CBC WITH AUTOMATED DIFF 
-     AMB POC URINALYSIS DIP STICK AUTO W/O MICRO 
-     AMB POC EKG ROUTINE W/ 12 LEADS, INTER & REP 2. Chronic nonintractable headache, unspecified headache type 
-     REFERRAL TO NEUROLOGY 3. Migraine syndrome 
-     REFERRAL TO NEUROLOGY 4. Endometriosis 5. Asthmatic bronchitis, mild persistent, with acute exacerbation 
-     budesonide-formoterol (SYMBICORT) 160-4.5 mcg/actuation HFAA; Take 2 Puffs by inhalation two (2) times a day. -     predniSONE (DELTASONE) 10 mg tablet; Take 10 mg by mouth daily. -     promethazine-dextromethorphan (PROMETHAZINE-DM) 6.25-15 mg/5 mL syrup; Take 5 mL by mouth four (4) times daily as needed for Cough for up to 7 days. 6. Laryngitis,chloraseptic prn Follow-up and Dispositions · Return in about 1 month (around 6/10/2019).

## 2019-05-14 ENCOUNTER — TELEPHONE (OUTPATIENT)
Dept: FAMILY MEDICINE CLINIC | Age: 44
End: 2019-05-14

## 2019-05-14 DIAGNOSIS — F41.9 ANXIETY DISORDER, UNSPECIFIED TYPE: ICD-10-CM

## 2019-05-14 DIAGNOSIS — F41.0 PANIC ATTACKS: ICD-10-CM

## 2019-05-14 LAB
ALBUMIN SERPL-MCNC: 4.4 G/DL (ref 3.5–5.5)
ALBUMIN/GLOB SERPL: 2.1 {RATIO} (ref 1.2–2.2)
ALP SERPL-CCNC: 85 IU/L (ref 39–117)
ALT SERPL-CCNC: 19 IU/L (ref 0–32)
AST SERPL-CCNC: 17 IU/L (ref 0–40)
BASOPHILS # BLD AUTO: 0 X10E3/UL (ref 0–0.2)
BASOPHILS NFR BLD AUTO: 0 %
BILIRUB SERPL-MCNC: <0.2 MG/DL (ref 0–1.2)
BUN SERPL-MCNC: 19 MG/DL (ref 6–24)
BUN/CREAT SERPL: 27 (ref 9–23)
CALCIUM SERPL-MCNC: 9.2 MG/DL (ref 8.7–10.2)
CHLORIDE SERPL-SCNC: 106 MMOL/L (ref 96–106)
CHOLEST SERPL-MCNC: 250 MG/DL (ref 100–199)
CO2 SERPL-SCNC: 21 MMOL/L (ref 20–29)
CREAT SERPL-MCNC: 0.71 MG/DL (ref 0.57–1)
EOSINOPHIL # BLD AUTO: 0.2 X10E3/UL (ref 0–0.4)
EOSINOPHIL NFR BLD AUTO: 3 %
ERYTHROCYTE [DISTWIDTH] IN BLOOD BY AUTOMATED COUNT: 13.9 % (ref 12.3–15.4)
GLOBULIN SER CALC-MCNC: 2.1 G/DL (ref 1.5–4.5)
GLUCOSE SERPL-MCNC: 98 MG/DL (ref 65–99)
HCT VFR BLD AUTO: 38.5 % (ref 34–46.6)
HDLC SERPL-MCNC: 51 MG/DL
HGB BLD-MCNC: 13.3 G/DL (ref 11.1–15.9)
IMM GRANULOCYTES # BLD AUTO: 0 X10E3/UL (ref 0–0.1)
IMM GRANULOCYTES NFR BLD AUTO: 0 %
INTERPRETATION, 910389: NORMAL
LDLC SERPL CALC-MCNC: 132 MG/DL (ref 0–99)
LYMPHOCYTES # BLD AUTO: 2.1 X10E3/UL (ref 0.7–3.1)
LYMPHOCYTES NFR BLD AUTO: 31 %
MCH RBC QN AUTO: 31.4 PG (ref 26.6–33)
MCHC RBC AUTO-ENTMCNC: 34.5 G/DL (ref 31.5–35.7)
MCV RBC AUTO: 91 FL (ref 79–97)
MONOCYTES # BLD AUTO: 0.3 X10E3/UL (ref 0.1–0.9)
MONOCYTES NFR BLD AUTO: 5 %
NEUTROPHILS # BLD AUTO: 4.2 X10E3/UL (ref 1.4–7)
NEUTROPHILS NFR BLD AUTO: 61 %
PLATELET # BLD AUTO: 225 X10E3/UL (ref 150–379)
POTASSIUM SERPL-SCNC: 4.4 MMOL/L (ref 3.5–5.2)
PROT SERPL-MCNC: 6.5 G/DL (ref 6–8.5)
RBC # BLD AUTO: 4.24 X10E6/UL (ref 3.77–5.28)
SODIUM SERPL-SCNC: 143 MMOL/L (ref 134–144)
TRIGL SERPL-MCNC: 336 MG/DL (ref 0–149)
VLDLC SERPL CALC-MCNC: 67 MG/DL (ref 5–40)
WBC # BLD AUTO: 6.9 X10E3/UL (ref 3.4–10.8)

## 2019-05-14 NOTE — TELEPHONE ENCOUNTER
RE: Prescription Question     From  Charli Trent To  [facility] Nurse Pool Sent  5/14/2019  8:02 AM   ----- Message from [address] [place] [address] [address], Generic sent at 5/14/2019  8:02 AM EDT -----       Symbicort inhaler    Previous Messages      ----- Message -----   From: Yimi Gil LPN   Sent: 5/39/35 56:30 AM   To: Kaitlin Phillips   Subject: RE: Prescription Question     Which medication is 380 dollars?     ----- Message -----   Jonah Buckner From: Birney Bloodgood Sent: 5/13/2019 11:33 AM EDT        To: Yayo Woodard MD   Subject: Prescription Question     I can not afford $380.00 for medicine. There there anything else I could take that is cheaper?

## 2019-05-14 NOTE — TELEPHONE ENCOUNTER
Patient states that Symbicort inhaler is $380 requesting another medication.  Telephone number [phone]

## 2019-05-15 ENCOUNTER — TELEPHONE (OUTPATIENT)
Dept: FAMILY MEDICINE CLINIC | Age: 44
End: 2019-05-15

## 2019-05-15 DIAGNOSIS — F41.0 PANIC ATTACKS: ICD-10-CM

## 2019-05-15 DIAGNOSIS — F41.9 ANXIETY DISORDER, UNSPECIFIED TYPE: ICD-10-CM

## 2019-05-15 DIAGNOSIS — F41.9 ANXIETY DISORDER, UNSPECIFIED TYPE: Primary | ICD-10-CM

## 2019-05-15 RX ORDER — ALPRAZOLAM 0.25 MG/1
TABLET ORAL
Qty: 50 TAB | Refills: 1 | Status: SHIPPED | OUTPATIENT
Start: 2019-05-15 | End: 2019-05-28 | Stop reason: SDUPTHER

## 2019-05-15 RX ORDER — ALPRAZOLAM 0.25 MG/1
0.25 TABLET ORAL
Qty: 50 TAB | Refills: 1 | Status: SHIPPED | OUTPATIENT
Start: 2019-05-15 | End: 2019-05-28 | Stop reason: SDUPTHER

## 2019-05-16 ENCOUNTER — TELEPHONE (OUTPATIENT)
Dept: FAMILY MEDICINE CLINIC | Age: 44
End: 2019-05-16

## 2019-05-16 RX ORDER — ALPRAZOLAM 0.25 MG/1
TABLET ORAL
Qty: 50 TAB | Refills: 1 | Status: SHIPPED | OUTPATIENT
Start: 2019-05-16 | End: 2019-07-02 | Stop reason: DRUGHIGH

## 2019-05-22 DIAGNOSIS — G43.909 MIGRAINE SYNDROME: ICD-10-CM

## 2019-05-22 RX ORDER — SERTRALINE HYDROCHLORIDE 100 MG/1
TABLET, FILM COATED ORAL
Qty: 30 TAB | Refills: 1 | Status: SHIPPED | OUTPATIENT
Start: 2019-05-22 | End: 2019-08-19 | Stop reason: SDUPTHER

## 2019-05-28 ENCOUNTER — OFFICE VISIT (OUTPATIENT)
Dept: FAMILY MEDICINE CLINIC | Age: 44
End: 2019-05-28

## 2019-05-28 VITALS
OXYGEN SATURATION: 98 % | HEART RATE: 100 BPM | SYSTOLIC BLOOD PRESSURE: 116 MMHG | BODY MASS INDEX: 27.23 KG/M2 | DIASTOLIC BLOOD PRESSURE: 68 MMHG | HEIGHT: 62 IN | WEIGHT: 148 LBS | RESPIRATION RATE: 20 BRPM | TEMPERATURE: 98 F

## 2019-05-28 DIAGNOSIS — R22.2 ABDOMINAL WALL MASS: Primary | ICD-10-CM

## 2019-05-28 NOTE — PROGRESS NOTES
Chief Complaint   Patient presents with    Cyst     Pt has painful abdominal knot. 1. Have you been to the ER, urgent care clinic since your last visit? Hospitalized since your last visit? No    2. Have you seen or consulted any other health care providers outside of the [address] [place] [address] since your last visit? Include any pap smears or colon screening.  No.

## 2019-05-28 NOTE — PROGRESS NOTES
HISTORY OF PRESENT ILLNESS  Margaretha Gosselin is a 38 y.o. female. 23 days epigastric pain ,tensernss ,mass. No apparent injury ,though having coughing spasms  Cyst   The history is provided by the patient. This is a new problem. The current episode started more than 2 days ago. The problem occurs daily. The problem has not changed since onset. Associated symptoms include abdominal pain. Possible Hernia   The history is provided by the patient. This is a new problem. The current episode started more than 2 days ago. The problem occurs hourly. The problem has not changed since onset. Associated symptoms include abdominal pain. Review of Systems   Constitutional: Negative for fever and malaise/fatigue. Respiratory: Positive for cough. Gastrointestinal: Positive for abdominal pain. Negative for blood in stool, constipation and melena. Genitourinary: Negative for frequency and urgency. Physical Exam   Constitutional: She appears well-developed and well-nourished. HENT:   Head: Normocephalic and atraumatic. Right Ear: External ear normal.   Left Ear: External ear normal.   Eyes: Pupils are equal, round, and reactive to light. Conjunctivae are normal.   Neck: Normal range of motion. Neck supple. Cardiovascular: Normal rate and regular rhythm. Pulmonary/Chest: Effort normal and breath sounds normal.   Abdominal: Soft. Bowel sounds are normal. There is tenderness. 4x4 cm tender abd omial wall mass,reducible       ASSESSMENT and PLAN  Diagnoses and all orders for this visit:    1. Abdominal wall mass  -     REFERRAL TO GENERAL SURGERY  -     CT ABD W CONT; Future      Follow-up and Dispositions    · Return if symptoms worsen or fail to improve.

## 2019-06-03 ENCOUNTER — OFFICE VISIT (OUTPATIENT)
Dept: SURGERY | Age: 44
End: 2019-06-03

## 2019-06-03 VITALS
TEMPERATURE: 97.8 F | BODY MASS INDEX: 27.42 KG/M2 | WEIGHT: 149 LBS | HEART RATE: 115 BPM | SYSTOLIC BLOOD PRESSURE: 114 MMHG | RESPIRATION RATE: 24 BRPM | OXYGEN SATURATION: 98 % | DIASTOLIC BLOOD PRESSURE: 80 MMHG | HEIGHT: 62 IN

## 2019-06-03 DIAGNOSIS — K43.9 VENTRAL HERNIA WITHOUT OBSTRUCTION OR GANGRENE: Primary | ICD-10-CM

## 2019-06-03 NOTE — PROGRESS NOTES
Surgery Consult:  Abdominal wall mass  Requesting physician:  Dr. Feliciano De La Cruz    Subjective:   Patient 38 y.o.  female presents with a painful \"knot\" in the epigastric area. Patient noticed it 5-6 days ago. No change in size. Patient describes the pain as sharp and it radiates down to the LLQ. Denies any recent trauma but has been coughing a lot for the past 6 weeks due to allergies. No nausea or vomiting. No change in bowel habits. No diarrhea or constipation. No F/C/S. Prior abdominal surgeries include diagnostic lap for endometriosis, lap hysterectomy, lap appendectomy, BTL, and .     Past Medical & Surgical History:  Past Medical History:   Diagnosis Date    Anxiety     Asthma     Depression     Factor V deficiency ([facility] [address] [facility]     Heart rate fast     Long term current use of anticoagulant therapy     Migraine     Thromboembolus (HCC)       Past Surgical History:   Procedure Laterality Date    ABDOMEN SURGERY PROC UNLISTED      laproscopy removed adhesions on colon    DILATION AND CURETTAGE      HX APPENDECTOMY      HX  SECTION      HX HYSTERECTOMY  2018    HX ORTHOPAEDIC      bone removed from foot    HX TUBAL LIGATION  c sec       Social History:  Social History     Socioeconomic History    Marital status:      Spouse name: Not on file    Number of children: Not on file    Years of education: Not on file    Highest education level: Not on file   Occupational History    Not on file   Social Needs    Financial resource strain: Not on file    Food insecurity:     Worry: Not on file     Inability: Not on file    Transportation needs:     Medical: Not on file     Non-medical: Not on file   Tobacco Use    Smoking status: Former Smoker     Packs/day: 0.50     Years: 21.00     Pack years: 10.50     Types: Cigarettes     Last attempt to quit: 3/5/2016     Years since quitting: 3.2    Smokeless tobacco: Never Used   Substance and Sexual Activity    Alcohol use: Yes     Alcohol/week: 0.0 oz     Comment: Occ    Drug use: No    Sexual activity: Yes     Partners: Male     Birth control/protection: IUD   Lifestyle    Physical activity:     Days per week: Not on file     Minutes per session: Not on file    Stress: Not on file   Relationships    Social connections:     Talks on phone: Not on file     Gets together: Not on file     Attends Confucianist service: Not on file     Active member of club or organization: Not on file     Attends meetings of clubs or organizations: Not on file     Relationship status: Not on file    Intimate partner violence:     Fear of current or ex partner: Not on file     Emotionally abused: Not on file     Physically abused: Not on file     Forced sexual activity: Not on file   Other Topics Concern    Not on file   Social History Narrative    ** Merged History Encounter **             Family History:  Family History   Problem Relation Age of Onset    Diabetes Mother     Alzheimer Mother     Hypertension Mother     High Cholesterol Mother     Cancer Father     Diabetes Sister     No Known Problems Brother     Immunodeficiency Sister     No Known Problems Sister         Medications:  Current Outpatient Medications   Medication Sig    sertraline (ZOLOFT) 100 mg tablet TAKE 1 TABLET BY MOUTH ONCE DAILY    ALPRAZolam (XANAX) 0.25 mg tablet TAKE 1 TABLET BY MOUTH THREE TIMES PER DAY AS NEEDED FOR ANXIETY    butalbital-acetaminophen-caffeine (FIORICET, ESGIC) -40 mg per tablet TAKE 1 TABLET BY MOUTH EVERY 6 HOURS AS NEEDED FOR PAIN    promethazine-codeine (PHENERGAN WITH CODEINE) 6.25-10 mg/5 mL syrup Take 5 mL by mouth four (4) times daily as needed for Cough. Max Daily Amount: 20 mL.     albuterol-ipratropium (DUO-NEB) 2.5 mg-0.5 mg/3 ml nebu USE 1 VIAL VIA NEBULIZER EVERY 6 HOURS AS NEEDED    ondansetron (ZOFRAN ODT) 4 mg disintegrating tablet DISSOLVE 1 TABLET ON THE TONGUE EVERY 8 HOURS AS NEEDED FOR NAUSEA    albuterol (PROVENTIL HFA) 90 mcg/actuation inhaler Take 2 Puffs by inhalation every four (4) hours as needed for Wheezing.  aspirin 81 mg chewable tablet Take 81 mg by mouth daily.  budesonide-formoterol (SYMBICORT) 160-4.5 mcg/actuation HFAA Take 2 Puffs by inhalation two (2) times a day. (Patient not taking: Reported on 5/28/2019)    predniSONE (DELTASONE) 10 mg tablet Take 10 mg by mouth daily.  traMADol (ULTRAM) 50 mg tablet Take 1 Tab by mouth every six (6) hours as needed for Pain. Max Daily Amount: 200 mg.    diclofenac EC (VOLTAREN) 50 mg EC tablet TAKE 1 TABLET BY MOUTH THREE TIMES DAILY WITH FOOD AS NEEDED    propranolol LA (INDERAL LA) 60 mg SR capsule TAKE 1 CAPSULE BY MOUTH DAILY    xbvycgd-xcgnpgjak-okqjjtmhwpiw (MIDRIN) -325 mg capsule 3 times daily as needed for migraine    chlorpheniramine-HYDROcodone (TUSSIONEX) 10-8 mg/5 mL suspension Take 5 mL by mouth every twelve (12) hours as needed for Cough. Max Daily Amount: 10 mL. (Patient not taking: Reported on 5/28/2019)    multivitamin (ONE A DAY) tablet Take 1 Tab by mouth daily. No current facility-administered medications for this visit. Allergies: Allergies   Allergen Reactions    Adhesive Tape-Silicones Other (comments)     Pt states this burns her skin. Review of Systems  A comprehensive review of systems was negative except for that written in the HPI. Objective:     Exam:    Visit Vitals  /80 (BP 1 Location: Left arm, BP Patient Position: Sitting)   Pulse (!) 115   Temp 97.8 °F (36.6 °C)   Resp 24   Ht 5' 1.5\" (1.562 m)   Wt 67.6 kg (149 lb)   LMP 06/10/2017   SpO2 98%   BMI 27.70 kg/m²     General appearance: alert, cooperative, no distress, appears stated age  Eyes: no sclera icterus  Lungs: clear to auscultation bilaterally  Heart: regular rate and rhythm  Abdomen: soft, non-distended. Non-reducible 2 x 2 cm epigastric bulge.   Bedside US performed and it shows fat-containing hernia with fascial defect measuring approximately 1.1 cm. Extremities: extremities normal, atraumatic, no cyanosis or edema. FIDENCIO. Skin: Skin color, texture, turgor normal. No rashes or lesions. No jaundice. Neurologic: Grossly normal    Assessment:     Epigastric ventral hernia, non-reducible    Plan:     Davinci ventral hernia repair  Risks, benefit, and alternative to surgery was discussed with the patient. The risks of surgery include but not limited to infection, bleeding, intraabdominal organ injury, bile duct injury, retained stone, bile leak, possible conversion to open, and the risks of general anesthetic. Patient is agreeable to surgery. All questions answered.

## 2019-06-03 NOTE — H&P (VIEW-ONLY)
Surgery Consult:  Abdominal wall mass Requesting physician:  Dr. Fausto Saenz Subjective:  
Patient 38 y.o.  female presents with a painful \"knot\" in the epigastric area. Patient noticed it 5-6 days ago. No change in size. Patient describes the pain as sharp and it radiates down to the LLQ. Denies any recent trauma but has been coughing a lot for the past 6 weeks due to allergies. No nausea or vomiting. No change in bowel habits. No diarrhea or constipation. No F/C/S. Prior abdominal surgeries include diagnostic lap for endometriosis, lap hysterectomy, lap appendectomy, BTL, and . Past Medical & Surgical History: 
Past Medical History:  
Diagnosis Date  Anxiety  Asthma  Depression  Factor V deficiency ([address]  Heart rate fast   
 Long term current use of anticoagulant therapy  Migraine  Thromboembolus ([facility] Past Surgical History:  
Procedure Laterality Date  ABDOMEN SURGERY PROC UNLISTED    
 laproscopy removed adhesions on colon [address]  HX  SECTION    
 HX HYSTERECTOMY  2018  HX ORTHOPAEDIC  2009  
 bone removed from foot  HX TUBAL LIGATION  c sec Social History: 
Social History Socioeconomic History  Marital status:  Spouse name: Not on file  Number of children: Not on file  Years of education: Not on file  Highest education level: Not on file Occupational History  Not on file Social Needs  Financial resource strain: Not on file  Food insecurity:  
  Worry: Not on file Inability: Not on file  Transportation needs:  
  Medical: Not on file Non-medical: Not on file Tobacco Use  Smoking status: Former Smoker Packs/day: 0.50 Years: 21.00 Pack years: 10.50 Types: Cigarettes Last attempt to quit: 3/5/2016 Years since quitting: 3.2  Smokeless tobacco: Never Used Substance and Sexual Activity  Alcohol use: Yes Alcohol/week: 0.0 oz  
  Comment: Occ  Drug use: No  
 Sexual activity: Yes  
  Partners: Male Birth control/protection: IUD Lifestyle  Physical activity:  
  Days per week: Not on file Minutes per session: Not on file  Stress: Not on file Relationships  Social connections:  
  Talks on phone: Not on file Gets together: Not on file Attends Gnosticist service: Not on file Active member of club or organization: Not on file Attends meetings of clubs or organizations: Not on file Relationship status: Not on file  Intimate partner violence:  
  Fear of current or ex partner: Not on file Emotionally abused: Not on file Physically abused: Not on file Forced sexual activity: Not on file Other Topics Concern  Not on file Social History Narrative ** Merged History Encounter ** Family History: 
Family History Problem Relation Age of Onset  Diabetes Mother  Alzheimer Mother  Hypertension Mother  High Cholesterol Mother  Cancer Father  Diabetes Sister  No Known Problems Brother  Immunodeficiency Sister  No Known Problems Sister Medications: 
Current Outpatient Medications Medication Sig  sertraline (ZOLOFT) 100 mg tablet TAKE 1 TABLET BY MOUTH ONCE DAILY  ALPRAZolam (XANAX) 0.25 mg tablet TAKE 1 TABLET BY MOUTH THREE TIMES PER DAY AS NEEDED FOR ANXIETY  butalbital-acetaminophen-caffeine (FIORICET, ESGIC) -40 mg per tablet TAKE 1 TABLET BY MOUTH EVERY 6 HOURS AS NEEDED FOR PAIN  promethazine-codeine (PHENERGAN WITH CODEINE) 6.25-10 mg/5 mL syrup Take 5 mL by mouth four (4) times daily as needed for Cough. Max Daily Amount: 20 mL.   
 albuterol-ipratropium (DUO-NEB) 2.5 mg-0.5 mg/3 ml nebu USE 1 VIAL VIA NEBULIZER EVERY 6 HOURS AS NEEDED  
 ondansetron (ZOFRAN ODT) 4 mg disintegrating tablet DISSOLVE 1 TABLET ON THE TONGUE EVERY 8 HOURS AS NEEDED FOR NAUSEA  
  albuterol (PROVENTIL HFA) 90 mcg/actuation inhaler Take 2 Puffs by inhalation every four (4) hours as needed for Wheezing.  aspirin 81 mg chewable tablet Take 81 mg by mouth daily.  budesonide-formoterol (SYMBICORT) 160-4.5 mcg/actuation HFAA Take 2 Puffs by inhalation two (2) times a day. (Patient not taking: Reported on 5/28/2019)  predniSONE (DELTASONE) 10 mg tablet Take 10 mg by mouth daily.  traMADol (ULTRAM) 50 mg tablet Take 1 Tab by mouth every six (6) hours as needed for Pain. Max Daily Amount: 200 mg.  
 diclofenac EC (VOLTAREN) 50 mg EC tablet TAKE 1 TABLET BY MOUTH THREE TIMES DAILY WITH FOOD AS NEEDED  propranolol LA (INDERAL LA) 60 mg SR capsule TAKE 1 CAPSULE BY MOUTH DAILY  xzrwktp-stsijrpik-utgkirposxwl (MIDRIN) -325 mg capsule 3 times daily as needed for migraine  chlorpheniramine-HYDROcodone (TUSSIONEX) 10-8 mg/5 mL suspension Take 5 mL by mouth every twelve (12) hours as needed for Cough. Max Daily Amount: 10 mL. (Patient not taking: Reported on 5/28/2019)  multivitamin (ONE A DAY) tablet Take 1 Tab by mouth daily. No current facility-administered medications for this visit. Allergies: Allergies Allergen Reactions  Adhesive Tape-Silicones Other (comments) Pt states this burns her skin. Review of Systems A comprehensive review of systems was negative except for that written in the HPI. Objective:  
 
Exam: 
 
Visit Vitals /80 (BP 1 Location: Left arm, BP Patient Position: Sitting) Pulse (!) 115 Temp 97.8 °F (36.6 °C) Resp 24 Ht 5' 1.5\" (1.562 m) Wt 67.6 kg (149 lb) LMP 06/10/2017 SpO2 98% BMI 27.70 kg/m² General appearance: alert, cooperative, no distress, appears stated age Eyes: no sclera icterus Lungs: clear to auscultation bilaterally Heart: regular rate and rhythm Abdomen: soft, non-distended. Non-reducible 2 x 2 cm epigastric bulge. Bedside US performed and it shows fat-containing hernia with fascial defect measuring approximately 1.1 cm. Extremities: extremities normal, atraumatic, no cyanosis or edema. FIDENCIO. Skin: Skin color, texture, turgor normal. No rashes or lesions. No jaundice. Neurologic: Grossly normal 
 
Assessment:  
 
Epigastric ventral hernia, non-reducible Plan:  
 
Davinci ventral hernia repair Risks, benefit, and alternative to surgery was discussed with the patient. The risks of surgery include but not limited to infection, bleeding, intraabdominal organ injury, bile duct injury, retained stone, bile leak, possible conversion to open, and the risks of general anesthetic. Patient is agreeable to surgery. All questions answered.

## 2019-06-03 NOTE — PROGRESS NOTES
Chief Complaint   Patient presents with    Cyst     seen at the request of Dr. Cesar mcdonald adb wall mass     1. Have you been to the ER, urgent care clinic since your last visit? Hospitalized since your last visit? No    2. Have you seen or consulted any other health care providers outside of the [address] [place] [address] since your last visit? Include any pap smears or colon screening. No     Discussed advanced directive. Patient states that she does  have an advanced directive.

## 2019-06-04 RX ORDER — PROMETHAZINE HYDROCHLORIDE AND DEXTROMETHORPHAN HYDROBROMIDE 6.25; 15 MG/5ML; MG/5ML
5 SYRUP ORAL
Qty: 180 ML | Refills: 1 | Status: SHIPPED | OUTPATIENT
Start: 2019-06-04 | End: 2019-06-11

## 2019-06-04 NOTE — TELEPHONE ENCOUNTER
Message from [address] [place] [address] [address], Generic sent at 6/3/2019  5:48 PM EDT -----     I am scheduled for surgery on June 19th for hernia repair. Is there any way to get cough medicine to last until then?

## 2019-06-08 DIAGNOSIS — G89.29 CHRONIC NONINTRACTABLE HEADACHE, UNSPECIFIED HEADACHE TYPE: ICD-10-CM

## 2019-06-08 DIAGNOSIS — M54.2 CERVICALGIA: ICD-10-CM

## 2019-06-08 DIAGNOSIS — R51.9 CHRONIC NONINTRACTABLE HEADACHE, UNSPECIFIED HEADACHE TYPE: ICD-10-CM

## 2019-06-10 ENCOUNTER — OFFICE VISIT (OUTPATIENT)
Dept: FAMILY MEDICINE CLINIC | Age: 44
End: 2019-06-10

## 2019-06-10 VITALS
BODY MASS INDEX: 28.13 KG/M2 | RESPIRATION RATE: 20 BRPM | WEIGHT: 149 LBS | SYSTOLIC BLOOD PRESSURE: 128 MMHG | DIASTOLIC BLOOD PRESSURE: 84 MMHG | HEIGHT: 61 IN | OXYGEN SATURATION: 98 % | TEMPERATURE: 98 F | HEART RATE: 76 BPM

## 2019-06-10 DIAGNOSIS — K43.9 VENTRAL HERNIA WITHOUT OBSTRUCTION OR GANGRENE: Primary | ICD-10-CM

## 2019-06-10 DIAGNOSIS — G43.909 MIGRAINE SYNDROME: ICD-10-CM

## 2019-06-10 RX ORDER — BUTALBITAL, ACETAMINOPHEN AND CAFFEINE 50; 325; 40 MG/1; MG/1; MG/1
TABLET ORAL
Qty: 60 TAB | Refills: 0 | Status: SHIPPED | OUTPATIENT
Start: 2019-06-10 | End: 2019-07-06 | Stop reason: SDUPTHER

## 2019-06-10 NOTE — PROGRESS NOTES
HISTORY OF PRESENT ILLNESS  Oscar Wilson is a 38 y.o. female. f/u abdominal pain,mass;seen by Dr Blanca Panchal ,scheduled for hernia repair next week. Hoarsness has resolved with prilosec and zyrtec. Headaches manageable,to see neuro next mo  Headache   The history is provided by the patient. This is a recurrent problem. The current episode started more than 2 days ago. The problem occurs every several days. The problem has been gradually improving. Associated symptoms include abdominal pain and headaches. Abdominal Pain   The history is provided by the patient. This is a chronic problem. The problem occurs daily. The problem has not changed since onset. Associated symptoms include abdominal pain and headaches. Review of Systems   Constitutional: Positive for malaise/fatigue. Negative for fever and weight loss. Gastrointestinal: Positive for abdominal pain. Neurological: Positive for headaches. Physical Exam   Constitutional: She is oriented to person, place, and time. She appears well-developed and well-nourished. HENT:   Head: Normocephalic and atraumatic. Right Ear: External ear normal.   Left Ear: External ear normal.   Nose: Nose normal.   Mouth/Throat: Oropharynx is clear and moist.   Cardiovascular: Normal rate and regular rhythm. Pulmonary/Chest: Effort normal and breath sounds normal.   Abdominal: Soft. Bowel sounds are normal.   Tender epigastric mass   Neurological: She is alert and oriented to person, place, and time. She has normal reflexes. Skin: Skin is warm and dry. ASSESSMENT and PLAN  Diagnoses and all orders for this visit:    1. Ventral hernia without obstruction or gangrene    2. Migraine syndrome      Follow-up and Dispositions    · Return in about 2 months (around 8/10/2019).

## 2019-06-10 NOTE — PROGRESS NOTES
Chief Complaint   Patient presents with    Headache     F/U on HA's.  Sinus Infection     Pt state she is having sinus infection. 1. Have you been to the ER, urgent care clinic since your last visit? Hospitalized since your last visit? No    2. Have you seen or consulted any other health care providers outside of the [address] [place] [address] since your last visit? Include any pap smears or colon screening.  No

## 2019-06-14 NOTE — PERIOP NOTES
[facility]  Preoperative Instructions        Surgery Date 6/19/19          Time of Arrival 1030    1. On the day of your surgery, please report to the Surgical Services Registration Desk and sign in at your designated time. The Surgery Center is located to the right of the Emergency Room. 2. You must have someone with you to drive you home. You should not drive a car for 24 hours following surgery. Please make arrangements for a friend or family member to stay with you for the first 24 hours after your surgery. 3. Do not have anything to eat or drink (including water, gum, mints, coffee, juice) after midnight 6/18/19.?? Oris Lepe ? This may not apply to medications prescribed by your physician. ?(Please note below the special instructions with medications to take the morning of your procedure.)    4. We recommend you do not drink any alcoholic beverages for 24 hours before and after your surgery. 5. Contact your surgeons office for instructions on the following medications: non-steroidal anti-inflammatory drugs (i.e. Advil, Aleve), vitamins, and supplements. (Some surgeons will want you to stop these medications prior to surgery and others may allow you to take them)  **If you are currently taking Plavix, Coumadin, Aspirin and/or other blood-thinning agents, contact your surgeon for instructions. ** Your surgeon will partner with the physician prescribing these medications to determine if it is safe to stop or if you need to continue taking. Please do not stop taking these medications without instructions from your surgeon    6. Wear comfortable clothes. Wear glasses instead of contacts. Do not bring any money or jewelry. Please bring picture ID, insurance card, and any prearranged co-payment or hospital payment. Do not wear make-up, particularly mascara the morning of your surgery. Do not wear nail polish, particularly if you are having foot /hand surgery.   Wear your hair loose or down, no ponytails, buns, liv pins or clips. All body piercings must be removed. Please shower with antibacterial soap for three consecutive days before and on the morning of surgery, but do not apply any lotions, powders or deodorants after the shower on the day of surgery. Please use a fresh towels after each shower. Please sleep in clean clothes and change bed linens the night before surgery. Please do not shave for 48 hours prior to surgery. Shaving of the face is acceptable. 7. You should understand that if you do not follow these instructions your surgery may be cancelled. If your physical condition changes (I.e. fever, cold or flu) please contact your surgeon as soon as possible. 8. It is important that you be on time. If a situation occurs where you may be late, please call [phone] (OR Holding Area). 9. If you have any questions and or problems, please call [phone] (Pre-admission Testing). 10. Your surgery time may be subject to change. You will receive a phone call the evening prior if your time changes. 11.  If having outpatient surgery, you must have someone to drive you here, stay with you during the duration of your stay, and to drive you home at time of discharge. 12.   In an effort to improve the efficiency, privacy, and safety for all of our Pre-op patients visitors are not allowed in the Holding area. Once you arrive and are registered your family/visitors will be asked to remain in the waiting room. The Pre-op staff will get you from the Surgical Waiting Area and will explain to you and your family/visitors that the Pre-op phase is beginning. The staff will answer any questions and provide instructions for tracking of the patient, by use of the existing tracking number and color-coded status board in the waiting room.   At this time the staff will also ask for your designated spokesperson information in the event that the physician or staff need to provide an update or obtain any pertinent information. The designated spokesperson will be notified if the physician needs to speak to family during the pre-operative phase. If at any time your family/visitors has questions or concerns they may approach the volunteer desk in the waiting area for assistance. Special Instructions:use & bring inhaler. MEDICATIONS TO TAKE THE MORNING OF SURGERY WITH A SIP OF WATER:xanax if needed. fiorecet if needed. zofran if needed. I understand a pre-operative phone call will be made to verify my surgery time. In the event that I am not available, I give permission for a message to be left on my answering service and/or with another person?   {yes @ [phone].         ___________________      __________   _________    (Signature of Patient)             (Witness)                (Date and Time)

## 2019-06-19 ENCOUNTER — ANESTHESIA (OUTPATIENT)
Dept: SURGERY | Age: 44
End: 2019-06-19
Payer: COMMERCIAL

## 2019-06-19 ENCOUNTER — ANESTHESIA EVENT (OUTPATIENT)
Dept: SURGERY | Age: 44
End: 2019-06-19
Payer: COMMERCIAL

## 2019-06-19 ENCOUNTER — HOSPITAL ENCOUNTER (OUTPATIENT)
Age: 44
Setting detail: OBSERVATION
Discharge: HOME | End: 2019-06-20
Attending: SURGERY | Admitting: SURGERY
Payer: COMMERCIAL

## 2019-06-19 DIAGNOSIS — R52 PAIN: Primary | ICD-10-CM

## 2019-06-19 PROBLEM — K43.9 VENTRAL HERNIA: Status: ACTIVE | Noted: 2019-06-19

## 2019-06-19 PROCEDURE — 76210000003 HC OR PH I REC 3.5 TO 4 HR: Performed by: SURGERY

## 2019-06-19 PROCEDURE — 74011250636 HC RX REV CODE- 250/636

## 2019-06-19 PROCEDURE — 74011250636 HC RX REV CODE- 250/636: Performed by: ANESTHESIOLOGY

## 2019-06-19 PROCEDURE — 74011000250 HC RX REV CODE- 250: Performed by: SURGERY

## 2019-06-19 PROCEDURE — 77030011943

## 2019-06-19 PROCEDURE — 74011250637 HC RX REV CODE- 250/637: Performed by: SURGERY

## 2019-06-19 PROCEDURE — 99218 HC RM OBSERVATION: CPT

## 2019-06-19 PROCEDURE — 74011000250 HC RX REV CODE- 250

## 2019-06-19 PROCEDURE — C1781 MESH (IMPLANTABLE): HCPCS | Performed by: SURGERY

## 2019-06-19 PROCEDURE — 77030035277 HC OBTRTR BLDELSS DISP INTU -B: Performed by: SURGERY

## 2019-06-19 PROCEDURE — 76010000875 HC OR TIME 1.5 TO 2HR INTENSV - TIER 2: Performed by: SURGERY

## 2019-06-19 PROCEDURE — 77030011992 HC AIRWY NASOPHGL TELE -A: Performed by: NURSE ANESTHETIST, CERTIFIED REGISTERED

## 2019-06-19 PROCEDURE — 77030019908 HC STETH ESOPH SIMS -A: Performed by: NURSE ANESTHETIST, CERTIFIED REGISTERED

## 2019-06-19 PROCEDURE — 77030003581 HC NDL INSUF VERES COVD -B: Performed by: SURGERY

## 2019-06-19 PROCEDURE — 77030013079 HC BLNKT BAIR HGGR 3M -A: Performed by: NURSE ANESTHETIST, CERTIFIED REGISTERED

## 2019-06-19 PROCEDURE — P9045 ALBUMIN (HUMAN), 5%, 250 ML: HCPCS

## 2019-06-19 PROCEDURE — 76060000034 HC ANESTHESIA 1.5 TO 2 HR: Performed by: SURGERY

## 2019-06-19 PROCEDURE — 77030008684 HC TU ET CUF COVD -B: Performed by: NURSE ANESTHETIST, CERTIFIED REGISTERED

## 2019-06-19 PROCEDURE — 77030032490 HC SLV COMPR SCD KNE COVD -B: Performed by: SURGERY

## 2019-06-19 PROCEDURE — 77030020703 HC SEAL CANN DISP INTU -B: Performed by: SURGERY

## 2019-06-19 PROCEDURE — 77030031139 HC SUT VCRL2 J&J -A: Performed by: SURGERY

## 2019-06-19 PROCEDURE — 77030010351 HC TRCR ENDOSC VSTP COVD -B: Performed by: SURGERY

## 2019-06-19 PROCEDURE — 77030011640 HC PAD GRND REM COVD -A: Performed by: SURGERY

## 2019-06-19 PROCEDURE — 77030036554: Performed by: SURGERY

## 2019-06-19 PROCEDURE — 77030018846 HC SOL IRR STRL H20 ICUM -A: Performed by: SURGERY

## 2019-06-19 PROCEDURE — 77030039266 HC ADH SKN EXOFIN S2SG -A: Performed by: SURGERY

## 2019-06-19 PROCEDURE — 74011000250 HC RX REV CODE- 250: Performed by: ANESTHESIOLOGY

## 2019-06-19 PROCEDURE — 74011250636 HC RX REV CODE- 250/636: Performed by: SURGERY

## 2019-06-19 PROCEDURE — 77030022704 HC SUT VLOC COVD -B: Performed by: SURGERY

## 2019-06-19 PROCEDURE — 77030002933 HC SUT MCRYL J&J -A: Performed by: SURGERY

## 2019-06-19 PROCEDURE — 77030016151 HC PROTCTR LNS DFOG COVD -B: Performed by: SURGERY

## 2019-06-19 PROCEDURE — 51798 US URINE CAPACITY MEASURE: CPT

## 2019-06-19 DEVICE — MESH SURG W4XL6IN ELLIPSE W/ ECHO 2 POS SYS VENTRALIGHT: Type: IMPLANTABLE DEVICE | Site: ABDOMEN | Status: FUNCTIONAL

## 2019-06-19 RX ORDER — PROPOFOL 10 MG/ML
INJECTION, EMULSION INTRAVENOUS AS NEEDED
Status: DISCONTINUED | OUTPATIENT
Start: 2019-06-19 | End: 2019-06-19 | Stop reason: HOSPADM

## 2019-06-19 RX ORDER — SUCCINYLCHOLINE CHLORIDE 20 MG/ML
INJECTION INTRAMUSCULAR; INTRAVENOUS AS NEEDED
Status: DISCONTINUED | OUTPATIENT
Start: 2019-06-19 | End: 2019-06-19 | Stop reason: HOSPADM

## 2019-06-19 RX ORDER — ACETAMINOPHEN 10 MG/ML
1000 INJECTION, SOLUTION INTRAVENOUS ONCE
Status: COMPLETED | OUTPATIENT
Start: 2019-06-19 | End: 2019-06-20

## 2019-06-19 RX ORDER — CEFAZOLIN SODIUM/WATER 2 G/20 ML
2 SYRINGE (ML) INTRAVENOUS
Status: COMPLETED | OUTPATIENT
Start: 2019-06-19 | End: 2019-06-19

## 2019-06-19 RX ORDER — MORPHINE SULFATE 2 MG/ML
2 INJECTION, SOLUTION INTRAMUSCULAR; INTRAVENOUS
Status: DISCONTINUED | OUTPATIENT
Start: 2019-06-19 | End: 2019-06-20 | Stop reason: HOSPADM

## 2019-06-19 RX ORDER — SERTRALINE HYDROCHLORIDE 50 MG/1
100 TABLET, FILM COATED ORAL DAILY
Status: DISCONTINUED | OUTPATIENT
Start: 2019-06-20 | End: 2019-06-20 | Stop reason: HOSPADM

## 2019-06-19 RX ORDER — FLUMAZENIL 0.1 MG/ML
0.3 INJECTION INTRAVENOUS ONCE
Status: COMPLETED | OUTPATIENT
Start: 2019-06-19 | End: 2019-06-19

## 2019-06-19 RX ORDER — MIDAZOLAM HYDROCHLORIDE 1 MG/ML
1 INJECTION, SOLUTION INTRAMUSCULAR; INTRAVENOUS AS NEEDED
Status: DISCONTINUED | OUTPATIENT
Start: 2019-06-19 | End: 2019-06-19 | Stop reason: HOSPADM

## 2019-06-19 RX ORDER — ONDANSETRON 2 MG/ML
INJECTION INTRAMUSCULAR; INTRAVENOUS AS NEEDED
Status: DISCONTINUED | OUTPATIENT
Start: 2019-06-19 | End: 2019-06-19 | Stop reason: HOSPADM

## 2019-06-19 RX ORDER — ALBUMIN HUMAN 50 G/1000ML
12.5 SOLUTION INTRAVENOUS ONCE
Status: COMPLETED | OUTPATIENT
Start: 2019-06-19 | End: 2019-06-20

## 2019-06-19 RX ORDER — PHENYLEPHRINE HCL IN 0.9% NACL 0.4MG/10ML
SYRINGE (ML) INTRAVENOUS AS NEEDED
Status: DISCONTINUED | OUTPATIENT
Start: 2019-06-19 | End: 2019-06-19 | Stop reason: HOSPADM

## 2019-06-19 RX ORDER — FENTANYL CITRATE 50 UG/ML
INJECTION, SOLUTION INTRAMUSCULAR; INTRAVENOUS AS NEEDED
Status: DISCONTINUED | OUTPATIENT
Start: 2019-06-19 | End: 2019-06-19 | Stop reason: HOSPADM

## 2019-06-19 RX ORDER — OXYCODONE HYDROCHLORIDE 5 MG/1
5 TABLET ORAL
Status: DISCONTINUED | OUTPATIENT
Start: 2019-06-19 | End: 2019-06-20 | Stop reason: HOSPADM

## 2019-06-19 RX ORDER — ROCURONIUM BROMIDE 10 MG/ML
INJECTION, SOLUTION INTRAVENOUS AS NEEDED
Status: DISCONTINUED | OUTPATIENT
Start: 2019-06-19 | End: 2019-06-19 | Stop reason: HOSPADM

## 2019-06-19 RX ORDER — FLUMAZENIL 0.1 MG/ML
0.2 INJECTION INTRAVENOUS ONCE
Status: COMPLETED | OUTPATIENT
Start: 2019-06-19 | End: 2019-06-19

## 2019-06-19 RX ORDER — OXYCODONE AND ACETAMINOPHEN 5; 325 MG/1; MG/1
1 TABLET ORAL
Qty: 40 TAB | Refills: 0 | Status: SHIPPED | OUTPATIENT
Start: 2019-06-19 | End: 2019-07-03

## 2019-06-19 RX ORDER — ONDANSETRON 2 MG/ML
4 INJECTION INTRAMUSCULAR; INTRAVENOUS
Status: DISCONTINUED | OUTPATIENT
Start: 2019-06-19 | End: 2019-06-20 | Stop reason: HOSPADM

## 2019-06-19 RX ORDER — SODIUM CHLORIDE 9 MG/ML
INJECTION INTRAMUSCULAR; INTRAVENOUS; SUBCUTANEOUS
Status: DISPENSED
Start: 2019-06-19 | End: 2019-06-20

## 2019-06-19 RX ORDER — ALPRAZOLAM 0.5 MG/1
0.5 TABLET ORAL
Status: DISCONTINUED | OUTPATIENT
Start: 2019-06-19 | End: 2019-06-20 | Stop reason: HOSPADM

## 2019-06-19 RX ORDER — FENTANYL CITRATE 50 UG/ML
25 INJECTION, SOLUTION INTRAMUSCULAR; INTRAVENOUS
Status: DISCONTINUED | OUTPATIENT
Start: 2019-06-19 | End: 2019-06-19 | Stop reason: HOSPADM

## 2019-06-19 RX ORDER — CEFAZOLIN SODIUM/WATER 2 G/20 ML
2 SYRINGE (ML) INTRAVENOUS EVERY 8 HOURS
Status: COMPLETED | OUTPATIENT
Start: 2019-06-19 | End: 2019-06-20

## 2019-06-19 RX ORDER — CEFAZOLIN SODIUM 1 G/3ML
1 INJECTION, POWDER, FOR SOLUTION INTRAMUSCULAR; INTRAVENOUS EVERY 8 HOURS
Status: DISCONTINUED | OUTPATIENT
Start: 2019-06-19 | End: 2019-06-19 | Stop reason: SDUPTHER

## 2019-06-19 RX ORDER — DIPHENHYDRAMINE HYDROCHLORIDE 50 MG/ML
12.5 INJECTION, SOLUTION INTRAMUSCULAR; INTRAVENOUS
Status: DISCONTINUED | OUTPATIENT
Start: 2019-06-19 | End: 2019-06-20 | Stop reason: HOSPADM

## 2019-06-19 RX ORDER — FENTANYL CITRATE 50 UG/ML
50 INJECTION, SOLUTION INTRAMUSCULAR; INTRAVENOUS AS NEEDED
Status: DISCONTINUED | OUTPATIENT
Start: 2019-06-19 | End: 2019-06-19 | Stop reason: HOSPADM

## 2019-06-19 RX ORDER — MIDAZOLAM HYDROCHLORIDE 1 MG/ML
INJECTION, SOLUTION INTRAMUSCULAR; INTRAVENOUS AS NEEDED
Status: DISCONTINUED | OUTPATIENT
Start: 2019-06-19 | End: 2019-06-19 | Stop reason: HOSPADM

## 2019-06-19 RX ORDER — GLYCOPYRROLATE 0.2 MG/ML
INJECTION INTRAMUSCULAR; INTRAVENOUS AS NEEDED
Status: DISCONTINUED | OUTPATIENT
Start: 2019-06-19 | End: 2019-06-19 | Stop reason: HOSPADM

## 2019-06-19 RX ORDER — HYDROCODONE BITARTRATE AND ACETAMINOPHEN 5; 325 MG/1; MG/1
1 TABLET ORAL AS NEEDED
Status: DISCONTINUED | OUTPATIENT
Start: 2019-06-19 | End: 2019-06-19 | Stop reason: HOSPADM

## 2019-06-19 RX ORDER — ONDANSETRON 2 MG/ML
4 INJECTION INTRAMUSCULAR; INTRAVENOUS AS NEEDED
Status: DISCONTINUED | OUTPATIENT
Start: 2019-06-19 | End: 2019-06-19 | Stop reason: HOSPADM

## 2019-06-19 RX ORDER — LIDOCAINE HYDROCHLORIDE 10 MG/ML
0.1 INJECTION, SOLUTION EPIDURAL; INFILTRATION; INTRACAUDAL; PERINEURAL AS NEEDED
Status: DISCONTINUED | OUTPATIENT
Start: 2019-06-19 | End: 2019-06-19 | Stop reason: HOSPADM

## 2019-06-19 RX ORDER — DEXAMETHASONE SODIUM PHOSPHATE 4 MG/ML
INJECTION, SOLUTION INTRA-ARTICULAR; INTRALESIONAL; INTRAMUSCULAR; INTRAVENOUS; SOFT TISSUE AS NEEDED
Status: DISCONTINUED | OUTPATIENT
Start: 2019-06-19 | End: 2019-06-19 | Stop reason: HOSPADM

## 2019-06-19 RX ORDER — SODIUM CHLORIDE 0.9 % (FLUSH) 0.9 %
5-40 SYRINGE (ML) INJECTION AS NEEDED
Status: DISCONTINUED | OUTPATIENT
Start: 2019-06-19 | End: 2019-06-20 | Stop reason: HOSPADM

## 2019-06-19 RX ORDER — DIPHENHYDRAMINE HYDROCHLORIDE 50 MG/ML
12.5 INJECTION, SOLUTION INTRAMUSCULAR; INTRAVENOUS AS NEEDED
Status: DISCONTINUED | OUTPATIENT
Start: 2019-06-19 | End: 2019-06-19 | Stop reason: HOSPADM

## 2019-06-19 RX ORDER — DEXTROSE, SODIUM CHLORIDE, AND POTASSIUM CHLORIDE 5; .45; .15 G/100ML; G/100ML; G/100ML
25 INJECTION INTRAVENOUS CONTINUOUS
Status: DISCONTINUED | OUTPATIENT
Start: 2019-06-19 | End: 2019-06-20 | Stop reason: HOSPADM

## 2019-06-19 RX ORDER — SODIUM CHLORIDE, SODIUM LACTATE, POTASSIUM CHLORIDE, CALCIUM CHLORIDE 600; 310; 30; 20 MG/100ML; MG/100ML; MG/100ML; MG/100ML
25 INJECTION, SOLUTION INTRAVENOUS CONTINUOUS
Status: DISCONTINUED | OUTPATIENT
Start: 2019-06-19 | End: 2019-06-19 | Stop reason: HOSPADM

## 2019-06-19 RX ORDER — BUPIVACAINE HYDROCHLORIDE AND EPINEPHRINE 2.5; 5 MG/ML; UG/ML
INJECTION, SOLUTION EPIDURAL; INFILTRATION; INTRACAUDAL; PERINEURAL AS NEEDED
Status: DISCONTINUED | OUTPATIENT
Start: 2019-06-19 | End: 2019-06-19 | Stop reason: HOSPADM

## 2019-06-19 RX ORDER — ALBUTEROL SULFATE 0.83 MG/ML
1.25 SOLUTION RESPIRATORY (INHALATION)
Status: DISCONTINUED | OUTPATIENT
Start: 2019-06-19 | End: 2019-06-20 | Stop reason: HOSPADM

## 2019-06-19 RX ORDER — PROCHLORPERAZINE EDISYLATE 5 MG/ML
INJECTION INTRAMUSCULAR; INTRAVENOUS
Status: DISPENSED
Start: 2019-06-19 | End: 2019-06-20

## 2019-06-19 RX ORDER — KETOROLAC TROMETHAMINE 30 MG/ML
INJECTION, SOLUTION INTRAMUSCULAR; INTRAVENOUS AS NEEDED
Status: DISCONTINUED | OUTPATIENT
Start: 2019-06-19 | End: 2019-06-19 | Stop reason: HOSPADM

## 2019-06-19 RX ORDER — SODIUM CHLORIDE 0.9 % (FLUSH) 0.9 %
5-40 SYRINGE (ML) INJECTION EVERY 8 HOURS
Status: DISCONTINUED | OUTPATIENT
Start: 2019-06-19 | End: 2019-06-20 | Stop reason: HOSPADM

## 2019-06-19 RX ORDER — ACETAMINOPHEN 10 MG/ML
INJECTION, SOLUTION INTRAVENOUS
Status: COMPLETED
Start: 2019-06-19 | End: 2019-06-19

## 2019-06-19 RX ORDER — HYDROMORPHONE HYDROCHLORIDE 1 MG/ML
0.5 INJECTION, SOLUTION INTRAMUSCULAR; INTRAVENOUS; SUBCUTANEOUS
Status: DISCONTINUED | OUTPATIENT
Start: 2019-06-19 | End: 2019-06-19 | Stop reason: HOSPADM

## 2019-06-19 RX ORDER — ALBUMIN HUMAN 50 G/1000ML
SOLUTION INTRAVENOUS
Status: COMPLETED
Start: 2019-06-19 | End: 2019-06-19

## 2019-06-19 RX ORDER — ENOXAPARIN SODIUM 100 MG/ML
40 INJECTION SUBCUTANEOUS EVERY 12 HOURS
Status: DISCONTINUED | OUTPATIENT
Start: 2019-06-19 | End: 2019-06-20 | Stop reason: HOSPADM

## 2019-06-19 RX ORDER — FLUMAZENIL 0.1 MG/ML
INJECTION INTRAVENOUS
Status: COMPLETED
Start: 2019-06-19 | End: 2019-06-19

## 2019-06-19 RX ORDER — HYDROMORPHONE HYDROCHLORIDE 2 MG/ML
INJECTION, SOLUTION INTRAMUSCULAR; INTRAVENOUS; SUBCUTANEOUS AS NEEDED
Status: DISCONTINUED | OUTPATIENT
Start: 2019-06-19 | End: 2019-06-19 | Stop reason: HOSPADM

## 2019-06-19 RX ORDER — DEXTROSE, SODIUM CHLORIDE, AND POTASSIUM CHLORIDE 5; .45; .15 G/100ML; G/100ML; G/100ML
INJECTION INTRAVENOUS
Status: COMPLETED
Start: 2019-06-19 | End: 2019-06-19

## 2019-06-19 RX ORDER — MIDAZOLAM HYDROCHLORIDE 1 MG/ML
0.5 INJECTION, SOLUTION INTRAMUSCULAR; INTRAVENOUS
Status: DISCONTINUED | OUTPATIENT
Start: 2019-06-19 | End: 2019-06-19 | Stop reason: HOSPADM

## 2019-06-19 RX ORDER — NEOSTIGMINE METHYLSULFATE 1 MG/ML
INJECTION INTRAVENOUS AS NEEDED
Status: DISCONTINUED | OUTPATIENT
Start: 2019-06-19 | End: 2019-06-19 | Stop reason: HOSPADM

## 2019-06-19 RX ADMIN — FENTANYL CITRATE 25 MCG: 50 INJECTION, SOLUTION INTRAMUSCULAR; INTRAVENOUS at 14:23

## 2019-06-19 RX ADMIN — PROCHLORPERAZINE EDISYLATE 5 MG: 5 INJECTION INTRAMUSCULAR; INTRAVENOUS at 17:08

## 2019-06-19 RX ADMIN — FLUMAZENIL 0.2 MG: 0.1 INJECTION, SOLUTION INTRAVENOUS at 16:29

## 2019-06-19 RX ADMIN — HYDROMORPHONE HYDROCHLORIDE 0.4 MG: 2 INJECTION, SOLUTION INTRAMUSCULAR; INTRAVENOUS; SUBCUTANEOUS at 12:54

## 2019-06-19 RX ADMIN — ACETAMINOPHEN 1000 MG: 10 INJECTION, SOLUTION INTRAVENOUS at 15:38

## 2019-06-19 RX ADMIN — Medication 2 G: at 12:27

## 2019-06-19 RX ADMIN — FENTANYL CITRATE 25 MCG: 50 INJECTION, SOLUTION INTRAMUSCULAR; INTRAVENOUS at 14:58

## 2019-06-19 RX ADMIN — SODIUM CHLORIDE, SODIUM LACTATE, POTASSIUM CHLORIDE, AND CALCIUM CHLORIDE 25 ML/HR: 600; 310; 30; 20 INJECTION, SOLUTION INTRAVENOUS at 11:42

## 2019-06-19 RX ADMIN — MIDAZOLAM HYDROCHLORIDE 2 MG: 1 INJECTION, SOLUTION INTRAMUSCULAR; INTRAVENOUS at 12:27

## 2019-06-19 RX ADMIN — FLUMAZENIL 0.3 MG: 0.1 INJECTION, SOLUTION INTRAVENOUS at 16:18

## 2019-06-19 RX ADMIN — ENOXAPARIN SODIUM 40 MG: 40 INJECTION SUBCUTANEOUS at 18:11

## 2019-06-19 RX ADMIN — DEXAMETHASONE SODIUM PHOSPHATE 4 MG: 4 INJECTION, SOLUTION INTRA-ARTICULAR; INTRALESIONAL; INTRAMUSCULAR; INTRAVENOUS; SOFT TISSUE at 12:45

## 2019-06-19 RX ADMIN — GLYCOPYRROLATE 0.6 MG: 0.2 INJECTION INTRAMUSCULAR; INTRAVENOUS at 13:42

## 2019-06-19 RX ADMIN — PROPOFOL 200 MG: 10 INJECTION, EMULSION INTRAVENOUS at 12:31

## 2019-06-19 RX ADMIN — SUCCINYLCHOLINE CHLORIDE 140 MG: 20 INJECTION INTRAMUSCULAR; INTRAVENOUS at 12:31

## 2019-06-19 RX ADMIN — FENTANYL CITRATE 50 MCG: 50 INJECTION, SOLUTION INTRAMUSCULAR; INTRAVENOUS at 12:54

## 2019-06-19 RX ADMIN — FLUMAZENIL 0.2 MG: 0.1 INJECTION INTRAVENOUS at 16:29

## 2019-06-19 RX ADMIN — ALBUMIN HUMAN 12.5 G: 50 SOLUTION INTRAVENOUS at 16:30

## 2019-06-19 RX ADMIN — ROCURONIUM BROMIDE 35 MG: 10 INJECTION, SOLUTION INTRAVENOUS at 12:38

## 2019-06-19 RX ADMIN — HYDROMORPHONE HYDROCHLORIDE 0.4 MG: 2 INJECTION, SOLUTION INTRAMUSCULAR; INTRAVENOUS; SUBCUTANEOUS at 13:16

## 2019-06-19 RX ADMIN — GLYCOPYRROLATE 0.2 MG: 0.2 INJECTION INTRAMUSCULAR; INTRAVENOUS at 13:52

## 2019-06-19 RX ADMIN — FENTANYL CITRATE 50 MCG: 50 INJECTION, SOLUTION INTRAMUSCULAR; INTRAVENOUS at 12:27

## 2019-06-19 RX ADMIN — Medication 80 MCG: at 12:35

## 2019-06-19 RX ADMIN — Medication 10 ML: at 21:17

## 2019-06-19 RX ADMIN — FENTANYL CITRATE 50 MCG: 50 INJECTION, SOLUTION INTRAMUSCULAR; INTRAVENOUS at 12:31

## 2019-06-19 RX ADMIN — SODIUM CHLORIDE, SODIUM LACTATE, POTASSIUM CHLORIDE, AND CALCIUM CHLORIDE: 600; 310; 30; 20 INJECTION, SOLUTION INTRAVENOUS at 12:27

## 2019-06-19 RX ADMIN — DEXTROSE MONOHYDRATE, SODIUM CHLORIDE, AND POTASSIUM CHLORIDE 75 ML/HR: 50; 4.5; 1.49 INJECTION, SOLUTION INTRAVENOUS at 17:20

## 2019-06-19 RX ADMIN — ONDANSETRON 4 MG: 2 INJECTION INTRAMUSCULAR; INTRAVENOUS at 13:31

## 2019-06-19 RX ADMIN — ROCURONIUM BROMIDE 5 MG: 10 INJECTION, SOLUTION INTRAVENOUS at 12:29

## 2019-06-19 RX ADMIN — FENTANYL CITRATE 50 MCG: 50 INJECTION, SOLUTION INTRAMUSCULAR; INTRAVENOUS at 12:30

## 2019-06-19 RX ADMIN — MORPHINE SULFATE 2 MG: 2 INJECTION, SOLUTION INTRAMUSCULAR; INTRAVENOUS at 18:11

## 2019-06-19 RX ADMIN — KETOROLAC TROMETHAMINE 30 MG: 30 INJECTION, SOLUTION INTRAMUSCULAR; INTRAVENOUS at 13:43

## 2019-06-19 RX ADMIN — ROCURONIUM BROMIDE 10 MG: 10 INJECTION, SOLUTION INTRAVENOUS at 13:13

## 2019-06-19 RX ADMIN — FENTANYL CITRATE 50 MCG: 50 INJECTION, SOLUTION INTRAMUSCULAR; INTRAVENOUS at 13:58

## 2019-06-19 RX ADMIN — Medication 2 G: at 21:17

## 2019-06-19 RX ADMIN — ONDANSETRON 4 MG: 2 INJECTION INTRAMUSCULAR; INTRAVENOUS at 21:51

## 2019-06-19 RX ADMIN — OXYCODONE HYDROCHLORIDE 5 MG: 5 TABLET ORAL at 21:31

## 2019-06-19 RX ADMIN — NEOSTIGMINE METHYLSULFATE 4 MG: 1 INJECTION INTRAVENOUS at 13:42

## 2019-06-19 RX ADMIN — ALBUMIN (HUMAN) 12.5 G: 2.5 SOLUTION INTRAVENOUS at 16:30

## 2019-06-19 NOTE — INTERVAL H&P NOTE
H&P Update: 
Zion Lagunas was seen and examined. History and physical has been reviewed. The patient has been examined.  There have been no significant clinical changes since the completion of the originally dated History and Physical.

## 2019-06-19 NOTE — OP NOTES
Patient ID:   Name: Brayan Galvez   Medical Record Number: [id number]   YOB: 1975    Date of Surgery: 2019     Preoperative Diagnosis:   Incarcerated ventral hernia    Postoperative Diagnosis: Same as preoperative diagnosis. Procedures:  Tod Emery incarcerated ventral hernia repair    Surgeon: Patti Foote MD     Surgical assistant:  Margot Darby    Anesthesia:  General    Estimated Blood Loss:   5 cc    Implants:    Implant Name Type Inv. Item Serial No.  Lot No. LRB No. Used   MESH W/ECHO 2 - 15CM [employer]  MESH W/ECHO 2 - 15CM 6IN -- Ramy Kramer [id number] [employer] [id number] N/A 1     Specimens:  None    Complication: None    Indications: Patient 38 y.o.  female presents with a painful \"knot\" in the epigastric area. Bedside US showed fat containing ventral hernia with 1.1 cm fascial defect. Prior abdominal surgeries include diagnostic lap for endometriosis, lap hysterectomy, lap appendectomy, BTL, and . Patient presents today for repair. Procedure Details: The patient was seen in the Holding Room. The risks, benefits, complications, treatment options, and expected outcomes were discussed with the patient. The site of surgery properly noted/marked. The patient was taken to operating room. The patient was placed supine position. After establishing general anesthesia, the abdomen was prepped and draped in standard surgical fashion. Stab incision was made in the left subcostal area and 8 mm blunt trocar was introduced under direct vision utilizing Optiview technique. The abdomen was insufflated to 15 mmHg of CO2. Additional trocars were placed. A 8 mm trocar was placed in the the left lateral abdomen and a 12 mm Versastep trocar was placed in the left lower quadrant. Abdomen was explored. Patient was noted to have fascial defect in the epigastric area just above where the falciform ligament inserts. Falciform ligament was taken down. Patient noted to have hernia with incarcerated preperitoneal fat which was carefully reduced. Patient noted to have 1 x 0.5 cm fascial defect. Fascial defect was closed with running 1-0 non-absorbable VLoc suture. The hernia repair was then reinforced with 4 x 6 inch ECHO mesh. The mesh was secured to the abdominal wall using running 2-0 absorbable VLoc suture. Hemostasis was good. Trocars were removed. The Skin was closed with subcuticular 4-0 Vicryl followed by Dermabond. Instrument, sponge, and needle counts were correct prior to closure and at the conclusion of the case. The patient was taken to recovery room in good condition having tolerated the procedure well.        CC: Stacie Austin MD

## 2019-06-19 NOTE — PERIOP NOTES
1400-Handoff Report from Operating Room to PACU    Report received from [address] and [place] CRNA regarding Edwige Hernandez. Surgeon(s):  Erick Figueroa MD  And Procedure(s) (LRB):  ROBOTIC ASSISTED INCARCERATED HERNIA VENTRAL REPAIR (N/A)  confirmed   with allergies and dressings discussed. Anesthesia type, drugs, patient history, complications, estimated blood loss, vital signs, intake and output, and last pain medication, lines, reversal medications and temperature were reviewed. 1500- Family updated. 80- Pt is extremely drowsy and falls asleep immediately after conversation. However, she maintains that her pain level is an 8/10. With current VS and drowsiness, assessment reported to Dr. Cassidy Mazariegos. Order received for 1000mg IV Ofirmev once. 1555- Family updated. 80- Dr. Cassidy Mazariegos notified of pt's continued drowsiness and severe pain level. Order received for 0.3mg IV romazicon now. 56- Dr. Cassidy Mazariegos at bedside. BP low and pt is still drowsy. 0.2mg IV Romazicon and 250ml of albumin ordered. [address] Dr. Walter Ornelas notified of pt status (hypotension, nausea, and drowsiness). Dr. Walter Ornelas stated she would place admission orders when she can. Pt's  is at the bedside and up to date on plan of care. Dr. Cassidy Mazariegos also notified. 1706- Pt is still nauseated and BP improved. Ordered received for 5mg IV compazine once from Dr. Casisdy Mazariegos. 1733- TRANSFER - OUT REPORT:    Verbal report given to Allyson MCCAULEY(name) on Adeline Michael Alan  being transferred to gen surg(unit) for routine post - op       Report consisted of patients Situation, Background, Assessment and   Recommendations(SBAR). Information from the following report(s) SBAR, Kardex, OR Summary, Procedure Summary, Intake/Output, MAR and Recent Results was reviewed with the receiving nurse. Opportunity for questions and clarification was provided.       Patient transported with:   O2 @ 2 liters  Registered Nurse

## 2019-06-19 NOTE — DISCHARGE INSTRUCTIONS
Patient Discharge Instructions    Destiney Hopson / [id number] : 1975    Admitted 2019 Discharged: 2019         What to do at Home    Recommended diet: Regular Diet    Recommended activity: no heavy lifting > 20 lbs x 6 weeks; no driving while taking narcotics for pain. May take shower, but no bath x 2 weeks. Keep ice over the incision to minimize swelling. Please wear abdominal binder when ambulating. Please take over the counter stool softener or miralax while taking narcotics for pain. If you experience a lot of drainage, develop redness around the wound, or a fever over 101 F occurs please call the office. Narcotics and anesthesia sometimes cause nausea and vomiting. If persistent please call the office. Do not hesitate to call with questions or concerns. Follow-up with Dr. Titus Kay in 2 weeks. Please call [phone] for an appointment. Information obtained by :  I understand that if any problems occur once I am at home I am to contact my physician. I understand and acknowledge receipt of the instructions indicated above.                                                                                                                                                Physician's or R.N.'s Signature                                                                  Date/Time                                                                                                                                              Patient or Representative Signature                                                          Date/Time

## 2019-06-19 NOTE — ANESTHESIA PREPROCEDURE EVALUATION
Relevant Problems   No relevant active problems       Anesthetic History   No history of anesthetic complications            Review of Systems / Medical History  Patient summary reviewed, nursing notes reviewed and pertinent labs reviewed    Pulmonary  Within defined limits          Asthma        Neuro/Psych   Within defined limits      Headaches and psychiatric history     Cardiovascular  Within defined limits                Exercise tolerance: >4 METS     GI/Hepatic/Renal  Within defined limits              Endo/Other  Within defined limits           Other Findings   Comments: Factor V deficiency   Hx DVT           Physical Exam    Airway  Mallampati: II  TM Distance: 4 - 6 cm  Neck ROM: normal range of motion   Mouth opening: Normal     Cardiovascular  Regular rate and rhythm,  S1 and S2 normal,  no murmur, click, rub, or gallop             Dental  No notable dental hx       Pulmonary  Breath sounds clear to auscultation               Abdominal  GI exam deferred       Other Findings            Anesthetic Plan    ASA: 2  Anesthesia type: general    Monitoring Plan: BIS      Induction: Intravenous  Anesthetic plan and risks discussed with: Patient

## 2019-06-19 NOTE — ANESTHESIA POSTPROCEDURE EVALUATION
Procedure(s):  ROBOTIC ASSISTED INCARCERATED HERNIA VENTRAL REPAIR. general    Anesthesia Post Evaluation        Patient location during evaluation: PACU  Note status: Adequate. Level of consciousness: responsive to verbal stimuli and sleepy but conscious  Pain management: satisfactory to patient  Airway patency: patent  Anesthetic complications: no  Cardiovascular status: acceptable  Respiratory status: acceptable  Hydration status: acceptable  Comments: +Post-Anesthesia Evaluation and Assessment    Patient: Moshe Arnold MRN: [id number]  SSN: [id number]   YOB: 1975  Age: 38 y.o. Sex: female      Cardiovascular Function/Vital Signs    BP 97/58   Pulse 60   Temp 36.7 °C (98.1 °F)   Resp 10   Ht 5' 1\" (1.549 m)   Wt 67.1 kg (147 lb 14.9 oz)   SpO2 100%   BMI 27.95 kg/m²     Patient is status post Procedure(s):  ROBOTIC ASSISTED INCARCERATED HERNIA VENTRAL REPAIR. Nausea/Vomiting: Controlled. Postoperative hydration reviewed and adequate. Pain:  Pain Scale 1: FLACC (06/19/19 1600)  Pain Intensity 1: 0 (06/19/19 1600)   Managed. Neurological Status:   Neuro (WDL): Exceptions to WDL (06/19/19 1400)   At baseline. Mental Status and Level of Consciousness: Arousable. Pulmonary Status:   O2 Device: Nasal cannula (06/19/19 1600)   Adequate oxygenation and airway patent. Complications related to anesthesia: None    Post-anesthesia assessment completed. No concerns. Signed By: Peggy Darden MD    6/19/2019  Post anesthesia nausea and vomiting:  controlled      Vitals Value Taken Time   BP 91/62 6/19/2019  5:15 PM   Temp 36.7 °C (98.1 °F) 6/19/2019  2:03 PM   Pulse 97 6/19/2019  5:27 PM   Resp 26 6/19/2019  5:27 PM   SpO2 100 % 6/19/2019  5:27 PM   Vitals shown include unvalidated device data.

## 2019-06-20 VITALS
RESPIRATION RATE: 16 BRPM | HEIGHT: 61 IN | DIASTOLIC BLOOD PRESSURE: 74 MMHG | WEIGHT: 147.93 LBS | BODY MASS INDEX: 27.93 KG/M2 | SYSTOLIC BLOOD PRESSURE: 114 MMHG | TEMPERATURE: 98.2 F | OXYGEN SATURATION: 99 % | HEART RATE: 71 BPM

## 2019-06-20 PROCEDURE — 74011250637 HC RX REV CODE- 250/637: Performed by: SURGERY

## 2019-06-20 PROCEDURE — 51798 US URINE CAPACITY MEASURE: CPT

## 2019-06-20 PROCEDURE — 99218 HC RM OBSERVATION: CPT

## 2019-06-20 PROCEDURE — 74011250636 HC RX REV CODE- 250/636: Performed by: SURGERY

## 2019-06-20 PROCEDURE — 77030027138 HC INCENT SPIROMETER -A

## 2019-06-20 RX ORDER — BUTALBITAL, ACETAMINOPHEN AND CAFFEINE 50; 325; 40 MG/1; MG/1; MG/1
1 TABLET ORAL
Status: DISCONTINUED | OUTPATIENT
Start: 2019-06-20 | End: 2019-06-20 | Stop reason: HOSPADM

## 2019-06-20 RX ORDER — ONDANSETRON 4 MG/1
4 TABLET, ORALLY DISINTEGRATING ORAL
Qty: 20 TAB | Refills: 0 | Status: SHIPPED | OUTPATIENT
Start: 2019-06-20 | End: 2019-07-03 | Stop reason: ALTCHOICE

## 2019-06-20 RX ADMIN — Medication 2 G: at 05:59

## 2019-06-20 RX ADMIN — Medication 2 G: at 13:13

## 2019-06-20 RX ADMIN — DEXTROSE MONOHYDRATE, SODIUM CHLORIDE, AND POTASSIUM CHLORIDE 75 ML/HR: 50; 4.5; 1.49 INJECTION, SOLUTION INTRAVENOUS at 06:07

## 2019-06-20 RX ADMIN — BUTALBITAL, ACETAMINOPHEN AND CAFFEINE 1 TABLET: 50; 325; 40 TABLET ORAL at 06:40

## 2019-06-20 RX ADMIN — ONDANSETRON 4 MG: 2 INJECTION INTRAMUSCULAR; INTRAVENOUS at 02:55

## 2019-06-20 RX ADMIN — Medication 10 ML: at 13:13

## 2019-06-20 RX ADMIN — MORPHINE SULFATE 2 MG: 2 INJECTION, SOLUTION INTRAMUSCULAR; INTRAVENOUS at 01:11

## 2019-06-20 RX ADMIN — Medication 10 ML: at 06:01

## 2019-06-20 RX ADMIN — ENOXAPARIN SODIUM 40 MG: 40 INJECTION SUBCUTANEOUS at 05:59

## 2019-06-20 RX ADMIN — SERTRALINE HYDROCHLORIDE 100 MG: 50 TABLET ORAL at 08:37

## 2019-06-20 RX ADMIN — OXYCODONE HYDROCHLORIDE 5 MG: 5 TABLET ORAL at 13:26

## 2019-06-20 RX ADMIN — ONDANSETRON 4 MG: 2 INJECTION INTRAMUSCULAR; INTRAVENOUS at 06:55

## 2019-06-20 NOTE — PROGRESS NOTES
PCP MICKEY appt scheduled with Dr. Miladys Coulter on 7/1/2019 at 11:15am. Appt added to AVS. Roseanne Singleton CM Specialist

## 2019-06-20 NOTE — PROGRESS NOTES
Pt going home today with belongings. Signed copy on bedside chart. PIV removed and applied dry dressing. Pt will make own follow up appt. Paperwork reviewed and 2 scripts given. Pt is voiding and tolerating diet.

## 2019-06-20 NOTE — PROGRESS NOTES
Bladder scan performed because patient has no voided since surgery and is uncomfortable. Scan showed 275mL in bladder, will straight cath per protocol. Got out 425. Bladder scanned again because the patient still hasn't voided on her own, got 228 on the scanner. Straight cath again because of continued patient discomfort. Got out 350.     Karlos Ríos RN

## 2019-06-20 NOTE — PROGRESS NOTES
Pt is feeling much better from the migraine and voiding on own. Measured 600 ml of urine for the morning. Pt will eat lunch and then go home afterwards. Has IS at bedside.

## 2019-06-20 NOTE — PROGRESS NOTES
MICKEY Plan:     *Home w/family    Patient is being discharged home today. Patient stated that her  will transport home. Patient does not have any needs or concerns.     Gwendolyn [id number]

## 2019-06-20 NOTE — PROGRESS NOTES
Bedside shift change report given to Juan Ramon Cohn RN  (oncoming nurse) by [address] RN  (offgoing nurse). Report included the following information SBAR, Kardex, OR Summary, Procedure Summary, Intake/Output, MAR, Accordion and Recent Results.

## 2019-06-20 NOTE — PROGRESS NOTES
SURGERY PROGRESS NOTE      Admit Date: 2019    POD 1 Day Post-Op    Procedure: Procedure(s):  ROBOTIC ASSISTED INCARCERATED HERNIA VENTRAL REPAIR      Subjective:     Emesis x 2 earlier this morning. Patient also unable to void and got straight cathed. Objective:     Visit Vitals  /78   Pulse 91   Temp 98.3 °F (36.8 °C)   Resp 16   Ht 5' 1\" (1.549 m)   Wt 67.1 kg (147 lb 14.9 oz)   LMP 06/10/2017   SpO2 99%   BMI 27.95 kg/m²        Temp (24hrs), Av.7 °F (36.5 °C), Min:97.2 °F (36.2 °C), Max:98.3 °F (36.8 °C)      Physical Exam:     Abdomen:  Soft. Non-distended. Incision C/D/I. Assessment:     Active Problems:    Ventral hernia (2019)        Plan/Recommendations/Medical Decision Making:     Ambulate  Discharge home this afternoon if patient able to tolerate diet and is able to void.

## 2019-06-20 NOTE — PROGRESS NOTES
General Surgery End of Shift Nursing Note    Bedside shift change report given to Anthony Saucedo RN (oncoming nurse) by Willie Atkins RN (offgoing nurse). Report included the following information SBAR, Kardex, OR Summary, Procedure Summary, Intake/Output, MAR and Recent Results. Shift worked:   7p-7a   Summary of shift:    Patient restless overnight with complaints of a migraine. Straight Cath once overnight twice per protocol because the patient was uncomfortable. Issues for physician to address:   None at this time. Number times ambulated in hallway past shift: 0    Number of times OOB to chair past shift: 0    Pain Management:  Current medication: See MAR  Patient states pain is manageable on current pain medication: YES    GI:    Current diet:  DIET REGULAR    Tolerating current diet: YES    Respiratory:    Incentive Spirometer at bedside: YES  Patient instructed on use: YES    Patient Safety:    Bed Alarm On? No  Sitter?  No    Jose Socks

## 2019-06-24 ENCOUNTER — TELEPHONE (OUTPATIENT)
Dept: SURGERY | Age: 44
End: 2019-06-24

## 2019-06-24 NOTE — TELEPHONE ENCOUNTER
Spoke with patient stated small amount of blood noted from incision site S/P Titi Castro incarcerated ventral hernia repair 06/19/2019, applied liquid bandaid, no fever, no foul odor and no redness. Encouraged patient to call if continues and no liquid bandaid. Express understanding.

## 2019-06-27 ENCOUNTER — OFFICE VISIT (OUTPATIENT)
Dept: SURGERY | Age: 44
End: 2019-06-27

## 2019-06-27 VITALS
HEART RATE: 89 BPM | BODY MASS INDEX: 28.7 KG/M2 | DIASTOLIC BLOOD PRESSURE: 86 MMHG | OXYGEN SATURATION: 96 % | TEMPERATURE: 97.4 F | SYSTOLIC BLOOD PRESSURE: 122 MMHG | HEIGHT: 61 IN | WEIGHT: 152 LBS

## 2019-06-27 DIAGNOSIS — Z09 POSTOPERATIVE EXAMINATION: Primary | ICD-10-CM

## 2019-06-27 NOTE — LETTER
NOTIFICATION OF RETURN TO WORK / SCHOOL 
 
6/27/2019 12:03 PM 
 
Ms. Radha Bishop [address] [place] [address] To Whom It May Concern: 
 
Radha Bishop was under the care of [address] from 06/03/2019 to present, patient was seen in office today. She will be able to return to work/school on 06/28/2019. If there are questions or concerns please have the patient contact our office. Sincerely, Guadlupe Boxer, MD

## 2019-06-27 NOTE — PROGRESS NOTES
Patient is here for follow-up. Patient underwent Jadieldena Almanzar incarcerated [address] [place] [address] on 9/19/19. Patient concerned about the incision. Patient noticed erythema around the incisions. PE:  Visit Vitals  /86 (BP 1 Location: Left arm, BP Patient Position: Sitting)   Pulse 89   Temp 97.4 °F (36.3 °C)   Ht 5' 1\" (1.549 m)   Wt 68.9 kg (152 lb)   LMP 06/10/2017   SpO2 96%   BMI 28.72 kg/m²     Abdomen:  Soft, ND. Inc C/D/I. Mild erythematous rash. Dermabond removed.     Assessment:    S/p Davinci incarcerated VHR  Rash most likely reaction to dermabond    Plan:  -Neosporin  -F/u next week as scheduled

## 2019-06-27 NOTE — PROGRESS NOTES
Chief Complaint   Patient presents with    Surgical Follow-up      Emerson Farr incarcerated ventral hernia repair 06/19/2019     1. Have you been to the ER, urgent care clinic since your last visit? Hospitalized since your last visit? No    2. Have you seen or consulted any other health care providers outside of the [address] [place] [address] since your last visit? Include any pap smears or colon screening.  No

## 2019-07-01 ENCOUNTER — OFFICE VISIT (OUTPATIENT)
Dept: FAMILY MEDICINE CLINIC | Age: 44
End: 2019-07-01

## 2019-07-01 VITALS
HEART RATE: 88 BPM | WEIGHT: 152 LBS | DIASTOLIC BLOOD PRESSURE: 84 MMHG | HEIGHT: 61 IN | SYSTOLIC BLOOD PRESSURE: 126 MMHG | BODY MASS INDEX: 28.7 KG/M2 | TEMPERATURE: 98.1 F | RESPIRATION RATE: 20 BRPM | OXYGEN SATURATION: 95 %

## 2019-07-01 DIAGNOSIS — Z87.19 S/P REPAIR OF VENTRAL HERNIA: Primary | ICD-10-CM

## 2019-07-01 DIAGNOSIS — G47.00 INSOMNIA, UNSPECIFIED TYPE: ICD-10-CM

## 2019-07-01 DIAGNOSIS — G43.909 MIGRAINE SYNDROME: ICD-10-CM

## 2019-07-01 DIAGNOSIS — Z98.890 S/P REPAIR OF VENTRAL HERNIA: Primary | ICD-10-CM

## 2019-07-01 DIAGNOSIS — F41.9 ANXIETY DISORDER, UNSPECIFIED TYPE: ICD-10-CM

## 2019-07-01 RX ORDER — TRAZODONE HYDROCHLORIDE 50 MG/1
50 TABLET ORAL
Qty: 30 TAB | Refills: 3 | Status: SHIPPED | OUTPATIENT
Start: 2019-07-01 | End: 2019-07-02 | Stop reason: SINTOL

## 2019-07-01 NOTE — PROGRESS NOTES
HISTORY OF PRESENT ILLNESS  Agustina Luna is a 38 y.o. female. F/u recent ventral hernia repair per Dr Bri Vail at [facility]. Doing well,though remains sore. Has started new job at [employer]. Has neuro appt soon for headache disorder. Doing well    Abdominal Pain   The history is provided by the patient. This is a chronic problem. The problem occurs daily. The problem has been gradually improving. Associated symptoms include abdominal pain and headaches. Pertinent negatives include no chest pain. Headache   The history is provided by the patient. This is a recurrent problem. The problem occurs every several days. The problem has not changed since onset. Associated symptoms include abdominal pain and headaches. Pertinent negatives include no chest pain. Review of Systems   Constitutional: Positive for malaise/fatigue. Negative for fever. Respiratory: Negative for cough and sputum production. Cardiovascular: Negative for chest pain. Gastrointestinal: Positive for abdominal pain. Neurological: Positive for headaches. Physical Exam   Constitutional: She appears well-developed and well-nourished. HENT:   Head: Normocephalic and atraumatic. Right Ear: External ear normal.   Left Ear: External ear normal.   Nose: Nose normal.   Mouth/Throat: Oropharynx is clear and moist.   Eyes: Pupils are equal, round, and reactive to light. Conjunctivae are normal.   Neck: Normal range of motion. Neck supple. Cardiovascular: Normal rate and regular rhythm. Pulmonary/Chest: Effort normal and breath sounds normal.   Abdominal: Soft. Bowel sounds are normal. There is tenderness. There is guarding. There is no rebound. Skin: Skin is warm and dry. ASSESSMENT and PLAN  Diagnoses and all orders for this visit:    1. S/P repair of ventral hernia,doing well    2. Migraine syndrome,stable ,to see neuro    3. Anxiety disorder, unspecified type    4. Insomnia, unspecified type  -     traZODone (DESYREL) 50 mg tablet;  Take 1 Tab by mouth nightly. Follow-up and Dispositions    · Return in about 3 months (around 10/1/2019).

## 2019-07-02 ENCOUNTER — TELEPHONE (OUTPATIENT)
Dept: FAMILY MEDICINE CLINIC | Age: 44
End: 2019-07-02

## 2019-07-02 DIAGNOSIS — F41.0 PANIC ATTACKS: Primary | ICD-10-CM

## 2019-07-02 RX ORDER — ALPRAZOLAM 0.5 MG/1
0.5 TABLET ORAL
Qty: 60 TAB | Refills: 3 | Status: SHIPPED | OUTPATIENT
Start: 2019-07-02 | End: 2019-11-04 | Stop reason: SDUPTHER

## 2019-07-02 NOTE — TELEPHONE ENCOUNTER
Jude Phillips  Female, 38 y.o., 1975  Weight:   152 lb (68.9 kg)  MRN:   [id number]  Phone:   [phone] (M)  PCP:   Desmond Peñaloza MD  Primary Cvg:   [facility] - [facility]  Last Appt With Me  None  Next Appt With Me  None  Next Appt  7/3/19 - Melanie Wade MD - [facility] [address] [address]  Prescription Question     From  Cynthia Menchaca To  [facility] Nurse Pool Sent  7/2/2019  8:56 AM   ----- Message from [address] [place] [address] [address], Generic sent at 7/2/2019  8:56 AM EDT -----     I can not take the new medication Trazodone.  I woke up with a terrible migraine. Is there something else we can do?     Encounter Messages     Read Composed From To Subject   Y 7/2/2019  8:56 AM Nick Stringer MD Prescription Question

## 2019-07-03 ENCOUNTER — OFFICE VISIT (OUTPATIENT)
Dept: SURGERY | Age: 44
End: 2019-07-03

## 2019-07-03 VITALS
SYSTOLIC BLOOD PRESSURE: 122 MMHG | BODY MASS INDEX: 27.72 KG/M2 | HEIGHT: 61 IN | WEIGHT: 146.8 LBS | HEART RATE: 95 BPM | OXYGEN SATURATION: 98 % | DIASTOLIC BLOOD PRESSURE: 84 MMHG | RESPIRATION RATE: 16 BRPM

## 2019-07-03 DIAGNOSIS — Z09 POSTOPERATIVE EXAMINATION: Primary | ICD-10-CM

## 2019-07-03 NOTE — PROGRESS NOTES
Chief Complaint   Patient presents with    Surgical Follow-up     Hernia Repair 6/19/19     1. Have you been to the ER, urgent care clinic since your last visit? Hospitalized since your last visit? No    2. Have you seen or consulted any other health care providers outside of the [address] [place] [address] since your last visit? Include any pap smears or colon screening.  No

## 2019-07-03 NOTE — PROGRESS NOTES
Patient is here for follow-up. Patient underwent Niño Manuel incarcerated [address] [place] [address] on 9/19/19. Doing well. No complaints. PE:  Visit Vitals  /84 (BP 1 Location: Right arm, BP Patient Position: Sitting)   Pulse 95   Resp 16   Ht 5' 1\" (1.549 m)   Wt 66.6 kg (146 lb 12.8 oz)   LMP 06/10/2017   SpO2 98%   BMI 27.74 kg/m²     Abdomen:  Soft, ND. Inc C/D/I.     Assessment:  S/p Davinci incarcerated VHR    Plan:  -f/u prn

## 2019-07-06 DIAGNOSIS — G89.29 CHRONIC NONINTRACTABLE HEADACHE, UNSPECIFIED HEADACHE TYPE: ICD-10-CM

## 2019-07-06 DIAGNOSIS — M54.2 CERVICALGIA: ICD-10-CM

## 2019-07-06 DIAGNOSIS — R51.9 CHRONIC NONINTRACTABLE HEADACHE, UNSPECIFIED HEADACHE TYPE: ICD-10-CM

## 2019-07-08 RX ORDER — BUTALBITAL, ACETAMINOPHEN AND CAFFEINE 50; 325; 40 MG/1; MG/1; MG/1
TABLET ORAL
Qty: 60 TAB | Refills: 0 | Status: SHIPPED | OUTPATIENT
Start: 2019-07-08 | End: 2019-12-28 | Stop reason: SDUPTHER

## 2019-07-22 ENCOUNTER — HOSPITAL ENCOUNTER (EMERGENCY)
Age: 44
Discharge: HOME | End: 2019-07-22
Attending: EMERGENCY MEDICINE
Payer: COMMERCIAL

## 2019-07-22 ENCOUNTER — DOCUMENTATION ONLY (OUTPATIENT)
Dept: FAMILY MEDICINE CLINIC | Age: 44
End: 2019-07-22

## 2019-07-22 VITALS
TEMPERATURE: 96.6 F | OXYGEN SATURATION: 100 % | SYSTOLIC BLOOD PRESSURE: 129 MMHG | DIASTOLIC BLOOD PRESSURE: 68 MMHG | HEART RATE: 75 BPM | RESPIRATION RATE: 16 BRPM

## 2019-07-22 DIAGNOSIS — G43.909 MIGRAINE WITHOUT STATUS MIGRAINOSUS, NOT INTRACTABLE, UNSPECIFIED MIGRAINE TYPE: Primary | ICD-10-CM

## 2019-07-22 LAB
COMMENT, HOLDF: NORMAL
SAMPLES BEING HELD,HOLD: NORMAL

## 2019-07-22 PROCEDURE — 74011250636 HC RX REV CODE- 250/636: Performed by: PHYSICIAN ASSISTANT

## 2019-07-22 PROCEDURE — 96375 TX/PRO/DX INJ NEW DRUG ADDON: CPT

## 2019-07-22 PROCEDURE — 99283 EMERGENCY DEPT VISIT LOW MDM: CPT

## 2019-07-22 PROCEDURE — 96374 THER/PROPH/DIAG INJ IV PUSH: CPT

## 2019-07-22 RX ORDER — KETOROLAC TROMETHAMINE 30 MG/ML
30 INJECTION, SOLUTION INTRAMUSCULAR; INTRAVENOUS
Status: COMPLETED | OUTPATIENT
Start: 2019-07-22 | End: 2019-07-22

## 2019-07-22 RX ORDER — DIPHENHYDRAMINE HYDROCHLORIDE 50 MG/ML
25 INJECTION, SOLUTION INTRAMUSCULAR; INTRAVENOUS
Status: COMPLETED | OUTPATIENT
Start: 2019-07-22 | End: 2019-07-22

## 2019-07-22 RX ORDER — SODIUM CHLORIDE 9 MG/ML
1000 INJECTION, SOLUTION INTRAVENOUS
Status: DISCONTINUED | OUTPATIENT
Start: 2019-07-22 | End: 2019-07-22 | Stop reason: HOSPADM

## 2019-07-22 RX ORDER — PROCHLORPERAZINE EDISYLATE 5 MG/ML
10 INJECTION INTRAMUSCULAR; INTRAVENOUS
Status: COMPLETED | OUTPATIENT
Start: 2019-07-22 | End: 2019-07-22

## 2019-07-22 RX ADMIN — KETOROLAC TROMETHAMINE 30 MG: 30 INJECTION, SOLUTION INTRAMUSCULAR at 10:40

## 2019-07-22 RX ADMIN — DIPHENHYDRAMINE HYDROCHLORIDE 25 MG: 50 INJECTION, SOLUTION INTRAMUSCULAR; INTRAVENOUS at 10:42

## 2019-07-22 RX ADMIN — PROCHLORPERAZINE EDISYLATE 10 MG: 5 INJECTION INTRAMUSCULAR; INTRAVENOUS at 10:42

## 2019-07-22 NOTE — ED PROVIDER NOTES
This is a 50-year-old  female with medical history significant for anxiety, asthma, depression, factor V deficiency, migraines and DVT presenting ambulatory to the emergency department with complaint of a 3-day history of generalized headache described as typical of migraines with associated photophobia, nausea, vomiting and neck/shoulder tightness. She is taking Fioricet and Zofran with minimal improvement. Last dose of Fioricet was on this morning. Zofran was taken on yesterday. She saw her neurologist this morning who referred to the ED for migraine management. She reports improvement with migraine cocktails in the past.  She denies any associated fever, chills, extremity numbness, tremor or weakness, vision changes, hearing changes, gait changes, chest pain, shortness of breath, abdominal pain, constipation, diarrhea or urinary changes. .                Past Medical History:   Diagnosis Date    Anxiety     Asthma     Depression     Factor V deficiency ([address]     Heart rate fast     Long term current use of anticoagulant therapy     Migraine     Thromboembolus ([facility] 2006    DVT left groin       Past Surgical History:   Procedure Laterality Date    ABDOMEN SURGERY PROC UNLISTED      laproscopy removed adhesions on colon    DILATION AND CURETTAGE      HX APPENDECTOMY      HX  SECTION      HX HERNIA REPAIR  2019     Natali Hernandes incarcerated ventral hernia repair    HX HYSTERECTOMY  2018    HX ORTHOPAEDIC  2009    bone removed from foot    HX TUBAL LIGATION  c sec         Family History:   Problem Relation Age of Onset    Diabetes Mother     Alzheimer Mother     Hypertension Mother     High Cholesterol Mother     Cancer Father     Diabetes Sister     No Known Problems Brother     Immunodeficiency Sister     No Known Problems Sister        Social History     Socioeconomic History    Marital status:      Spouse name: Not on file    Number of children: Not on file    Years of education: Not on file    Highest education level: Not on file   Occupational History    Not on file   Social Needs    Financial resource strain: Not on file    Food insecurity:     Worry: Not on file     Inability: Not on file    Transportation needs:     Medical: Not on file     Non-medical: Not on file   Tobacco Use    Smoking status: Former Smoker     Packs/day: 0.50     Years: 21.00     Pack years: 10.50     Types: Cigarettes     Last attempt to quit: 3/5/2016     Years since quitting: 3.3    Smokeless tobacco: Never Used   Substance and Sexual Activity    Alcohol use: Yes     Alcohol/week: 0.0 standard drinks     Comment: Occ    Drug use: No    Sexual activity: Yes     Partners: Male     Birth control/protection: IUD   Lifestyle    Physical activity:     Days per week: Not on file     Minutes per session: Not on file    Stress: Not on file   Relationships    Social connections:     Talks on phone: Not on file     Gets together: Not on file     Attends Mu-ism service: Not on file     Active member of club or organization: Not on file     Attends meetings of clubs or organizations: Not on file     Relationship status: Not on file    Intimate partner violence:     Fear of current or ex partner: Not on file     Emotionally abused: Not on file     Physically abused: Not on file     Forced sexual activity: Not on file   Other Topics Concern    Not on file   Social History Narrative    ** Merged History Encounter **              ALLERGIES: Adhesive tape-silicones    Review of Systems   Constitutional: Negative. Negative for chills, fatigue and fever. HENT: Negative. Negative for congestion, ear pain, rhinorrhea, sneezing and sore throat. Eyes: Positive for photophobia. Respiratory: Negative for cough and shortness of breath. Cardiovascular: Negative. Negative for chest pain. Gastrointestinal: Positive for nausea and vomiting.  Negative for abdominal pain, constipation and diarrhea. Genitourinary: Negative for difficulty urinating, frequency and urgency. Musculoskeletal: Positive for arthralgias, neck pain and neck stiffness. Neurological: Positive for headaches (migraine). Negative for dizziness and numbness. Psychiatric/Behavioral: Negative for confusion. All other systems reviewed and are negative. Vitals:    07/22/19 0931   Pulse: 94   SpO2: 99%            Physical Exam   Constitutional: She is oriented to person, place, and time. She appears well-developed and well-nourished. No distress.  female in NAD   HENT:   Head: Normocephalic and atraumatic. Right Ear: External ear normal.   Left Ear: External ear normal.   Nose: Nose normal.   Mouth/Throat: Oropharynx is clear and moist. No oropharyngeal exudate. Eyes: Pupils are equal, round, and reactive to light. Conjunctivae are normal.   Photophobic     Neck: Normal range of motion. Neck supple. No meningeal irritation   Cardiovascular: Normal rate, regular rhythm and normal heart sounds. Pulmonary/Chest: Effort normal. No respiratory distress. She has no wheezes. Abdominal: Soft. Bowel sounds are normal. She exhibits no distension. There is no tenderness. There is no rebound. Musculoskeletal: Normal range of motion. Neurological: She is alert and oriented to person, place, and time. She displays normal reflexes. No cranial nerve deficit or sensory deficit. She exhibits normal muscle tone. Coordination normal.   Skin: Skin is warm and dry. No ecchymosis, no laceration and no lesion noted. Psychiatric: She has a normal mood and affect. Her behavior is normal.   Nursing note and vitals reviewed. MDM  Number of Diagnoses or Management Options  Diagnosis management comments: 64-year-old  female with history of migraine headaches presenting with headache describes typical migraine headaches. Patient appears well otherwise. Vitals are stable.   Nonfocal neuro exam. Will give migraine cocktail since patient reports improvement in the past and reassess. Procedures        Progress note  Pt feeling better. [facility] [place]    Patient's results have been reviewed with them. Patient and/or family have verbally conveyed their understanding and agreement of the patient's signs, symptoms, diagnosis, treatment and prognosis and additionally agree to follow up as recommended or return to the Emergency Room should their condition change prior to follow-up. Discharge instructions have also been provided to the patient with some educational information regarding their diagnosis as well a list of reasons why they would want to return to the ER prior to their follow-up appointment should their condition change.  Shala Gil [place]

## 2019-07-22 NOTE — PROGRESS NOTES
Patient came in stating she had really bad migraine. She was told that we could give her a call if an appt opens up for Dr. Christos Villanueva.  She said that she would just go to ER at [facility].

## 2019-07-22 NOTE — DISCHARGE INSTRUCTIONS
Patient Education        Migraine Headache: Care Instructions  Your Care Instructions  Migraines are painful, throbbing headaches that often start on one side of the head. They may cause nausea and vomiting and make you sensitive to light, sound, or smell. Without treatment, migraines can last from 4 hours to a few days. Medicines can help prevent migraines or stop them after they have started. Your doctor can help you find which ones work best for you. Follow-up care is a key part of your treatment and safety. Be sure to make and go to all appointments, and call your doctor if you are having problems. It's also a good idea to know your test results and keep a list of the medicines you take. How can you care for yourself at home? · Do not drive if you have taken a prescription pain medicine. · Rest in a quiet, dark room until your headache is gone. Close your eyes, and try to relax or go to sleep. Don't watch TV or read. · Put a cold, moist cloth or cold pack on the painful area for 10 to 20 minutes at a time. Put a thin cloth between the cold pack and your skin. · Use a warm, moist towel or a heating pad set on low to relax tight shoulder and neck muscles. · Have someone gently massage your neck and shoulders. · Take your medicines exactly as prescribed. Call your doctor if you think you are having a problem with your medicine. You will get more details on the specific medicines your doctor prescribes. · Be careful not to take pain medicine more often than the instructions allow. You could get worse or more frequent headaches when the medicine wears off. To prevent migraines  · Keep a headache diary so you can figure out what triggers your headaches. Avoiding triggers may help you prevent headaches. Record when each headache began, how long it lasted, and what the pain was like.  (Was it throbbing, aching, stabbing, or dull?) Write down any other symptoms you had with the headache, such as nausea, flashing lights or dark spots, or sensitivity to bright light or loud noise. Note if the headache occurred near your period. List anything that might have triggered the headache. Triggers may include certain foods (chocolate, cheese, wine) or odors, smoke, bright light, stress, or lack of sleep. · If your doctor has prescribed medicine for your migraines, take it as directed. You may have medicine that you take only when you get a migraine and medicine that you take all the time to help prevent migraines. ? If your doctor has prescribed medicine for when you get a headache, take it at the first sign of a migraine, unless your doctor has given you other instructions. ? If your doctor has prescribed medicine to prevent migraines, take it exactly as prescribed. Call your doctor if you think you are having a problem with your medicine. · Find healthy ways to deal with stress. Migraines are most common during or right after stressful times. Take time to relax before and after you do something that has caused a migraine in the past.  · Try to keep your muscles relaxed by keeping good posture. Check your jaw, face, neck, and shoulder muscles for tension. Try to relax them. When you sit at a desk, change positions often. And make sure to stretch for 30 seconds each hour. · Get plenty of sleep and exercise. · Eat meals on a regular schedule. Avoid foods and drinks that often trigger migraines. These include chocolate, alcohol (especially red wine and port), aspartame, monosodium glutamate (MSG), and some additives found in foods (such as hot dogs, samuel, cold cuts, aged cheeses, and pickled foods). · Limit caffeine. Don't drink too much coffee, tea, or soda. But don't quit caffeine suddenly. That can also give you migraines. · Do not smoke or allow others to smoke around you. If you need help quitting, talk to your doctor about stop-smoking programs and medicines.  These can increase your chances of quitting for good.  · If you are taking birth control pills or hormone therapy, talk to your doctor about whether they are triggering your migraines. When should you call for help? Call 911 anytime you think you may need emergency care. For example, call if:    · You have signs of a stroke. These may include:  ? Sudden numbness, paralysis, or weakness in your face, arm, or leg, especially on only one side of your body. ? Sudden vision changes. ? Sudden trouble speaking. ? Sudden confusion or trouble understanding simple statements. ? Sudden problems with walking or balance. ? A sudden, severe headache that is different from past headaches.    Call your doctor now or seek immediate medical care if:    · You have new or worse nausea and vomiting.     · You have a new or higher fever.     · Your headache gets much worse.    Watch closely for changes in your health, and be sure to contact your doctor if:    · You are not getting better after 2 days (48 hours). Where can you learn more? Go to http://manuel-rody.info/. Enter U265 in the search box to learn more about \"Migraine Headache: Care Instructions. \"  Current as of: March 28, 2019  Content Version: 12.1  © 9642-0454 [employer]. Care instructions adapted under license by [employer] (which disclaims liability or warranty for this information). If you have questions about a medical condition or this instruction, always ask your healthcare professional. [address] any warranty or liability for your use of this information.

## 2019-07-22 NOTE — ED TRIAGE NOTES
Pt c/o right sided migraine since Friday w/ N/V, sensitivity to light and sound, taking Fioricet and Zofran w/o relief. Hx of migraines.

## 2019-07-30 ENCOUNTER — OFFICE VISIT (OUTPATIENT)
Dept: NEUROLOGY | Age: 44
End: 2019-07-30

## 2019-07-30 VITALS
OXYGEN SATURATION: 98 % | SYSTOLIC BLOOD PRESSURE: 96 MMHG | DIASTOLIC BLOOD PRESSURE: 62 MMHG | WEIGHT: 145 LBS | HEIGHT: 61 IN | BODY MASS INDEX: 27.38 KG/M2 | HEART RATE: 82 BPM

## 2019-07-30 DIAGNOSIS — G43.719 CHRONIC MIGRAINE WITHOUT AURA, INTRACTABLE, WITHOUT STATUS MIGRAINOSUS: Primary | ICD-10-CM

## 2019-07-30 DIAGNOSIS — M54.81 OCCIPITAL NEURALGIA OF RIGHT SIDE: ICD-10-CM

## 2019-07-30 DIAGNOSIS — G44.86 CERVICOGENIC HEADACHE: ICD-10-CM

## 2019-07-30 RX ORDER — KETOROLAC TROMETHAMINE 10 MG/1
10 TABLET, FILM COATED ORAL EVERY 6 HOURS
Qty: 20 TAB | Refills: 0 | Status: SHIPPED | OUTPATIENT
Start: 2019-07-30 | End: 2019-08-04

## 2019-07-30 RX ORDER — ELETRIPTAN HYDROBROMIDE 40 MG/1
40 TABLET, FILM COATED ORAL
Qty: 9 TAB | Refills: 1 | Status: SHIPPED | OUTPATIENT
Start: 2019-07-30 | End: 2019-09-03

## 2019-07-30 RX ORDER — CETIRIZINE HYDROCHLORIDE, PSEUDOEPHEDRINE HYDROCHLORIDE 5; 120 MG/1; MG/1
1 TABLET, FILM COATED, EXTENDED RELEASE ORAL 2 TIMES DAILY
COMMUNITY
End: 2020-01-30

## 2019-07-30 RX ORDER — LANOLIN ALCOHOL/MO/W.PET/CERES
CREAM (GRAM) TOPICAL
COMMUNITY
End: 2020-01-30

## 2019-07-30 RX ORDER — ZINC GLUCONATE 50 MG
TABLET ORAL
COMMUNITY
End: 2020-01-30

## 2019-07-30 RX ORDER — OMEPRAZOLE 20 MG/1
20 CAPSULE, DELAYED RELEASE ORAL DAILY
COMMUNITY

## 2019-07-30 RX ORDER — BACLOFEN 10 MG/1
TABLET ORAL
Qty: 90 TAB | Refills: 1 | Status: SHIPPED | OUTPATIENT
Start: 2019-07-30 | End: 2019-11-22

## 2019-07-30 RX ORDER — LANOLIN ALCOHOL/MO/W.PET/CERES
500 CREAM (GRAM) TOPICAL DAILY
COMMUNITY
End: 2020-01-30

## 2019-07-30 RX ORDER — ACETAMINOPHEN 500 MG
1000 TABLET ORAL
COMMUNITY

## 2019-07-30 NOTE — PROGRESS NOTES
NEUROLOGY CLINIC NOTE    Patient ID:  Araceli White  [id number]  58 y.o.  1975    Date of Consultation:  2019    Referring Physician: Dr. Aminata Berman    Reason for Consultation:  Chronic headache    Chief Complaint   Patient presents with    New Patient     20 or more days a month with Migraines/headaches        History of Present Illness:     Patient Active Problem List    Diagnosis Date Noted    Ventral hernia 2019    Anxiety     Intractable migraine with aura with status migrainosus 2016    Subacute maxillary sinusitis 2016     Past Medical History:   Diagnosis Date    Anxiety     Asthma     Depression     Factor V deficiency ([facility] [address]     Heart rate fast     Long term current use of anticoagulant therapy     Migraine     Thromboembolus ([facility] [address] 2006    DVT left groin      Past Surgical History:   Procedure Laterality Date    ABDOMEN SURGERY PROC UNLISTED      laproscopy removed adhesions on colon    DILATION AND CURETTAGE      HX APPENDECTOMY      HX  SECTION      [address]  2019     Vera Cerrato incarcerated ventral hernia repair    HX HYSTERECTOMY  2018    HX ORTHOPAEDIC  2009    bone removed from foot    HX TUBAL LIGATION  c sec      Prior to Admission medications    Medication Sig Start Date End Date Taking? Authorizing Provider   omeprazole (PRILOSEC) 20 mg capsule Take 20 mg by mouth daily. Yes Provider, Historical   cetirizine-pseudoePHEDrine (ZYRTEC-D) 5-120 mg Tb12 Take 1 Tab by mouth two (2) times a day. Yes Provider, Historical   Magnesium Oxide 500 mg cap Take  by mouth. Yes Provider, Historical   ferrous sulfate (IRON) 325 mg (65 mg iron) tablet Take  by mouth Daily (before breakfast). Yes Provider, Historical   potassium 99 mg tablet Take 99 mg by mouth daily. Yes Provider, Historical   zinc 50 mg tab tablet Take  by mouth daily.    Yes Provider, Historical   cyanocobalamin (VITAMIN B12) 500 mcg tablet Take 500 mcg by mouth daily. Yes Provider, Historical   acetaminophen (TYLENOL EXTRA STRENGTH) 500 mg tablet Take 1,000 mg by mouth. Yes Provider, Historical   butalbital-acetaminophen-caffeine (FIORICET, ESGIC) -40 mg per tablet TAKE 1 TABLET BY MOUTH EVERY 6 HOURS AS NEEDED FOR PAIN 7/8/19  Yes Latonia Gudino MD   ALPRAZolam Darlen Lobe) 0.5 mg tablet Take 1 Tab by mouth two (2) times daily as needed for Anxiety. Max Daily Amount: 1 mg. 7/2/19  Yes Latonia Gudino MD   albuterol (PROVENTIL HFA) 90 mcg/actuation inhaler Take 2 Puffs by inhalation every four (4) hours as needed for Wheezing. 12/4/17  Yes Latonia Gudino MD   aspirin 81 mg chewable tablet Take 81 mg by mouth daily. Yes Other, MD Phani   sertraline (ZOLOFT) 100 mg tablet TAKE 1 TABLET BY MOUTH ONCE DAILY 5/22/19   Latonia Gudino MD   albuterol-ipratropium (DUO-NEB) 2.5 mg-0.5 mg/3 ml nebu USE 1 VIAL VIA NEBULIZER EVERY 6 HOURS AS NEEDED 12/19/18   Latonia Gudino MD   chlorpheniramine-HYDROcodone (TUSSIONEX) 10-8 mg/5 mL suspension Take 5 mL by mouth every twelve (12) hours as needed for Cough. Max Daily Amount: 10 mL. 12/4/17   Latonia Gudino MD     Allergies   Allergen Reactions    Adhesive Tape-Silicones Other (comments)     Pt states this burns her skin. Social History     Tobacco Use    Smoking status: Former Smoker     Packs/day: 0.50     Years: 21.00     Pack years: 10.50     Types: Cigarettes     Last attempt to quit: 3/5/2016     Years since quitting: 3.4    Smokeless tobacco: Never Used   Substance Use Topics    Alcohol use:  Yes     Alcohol/week: 0.0 standard drinks     Comment: Occ      Family History   Problem Relation Age of Onset    Diabetes Mother     Alzheimer Mother     Hypertension Mother     High Cholesterol Mother     Headache Mother     Heart Disease Mother     Cancer Father     Headache Father     Heart Disease Father     Diabetes Sister     No Known Problems Brother     Immunodeficiency Sister     No Known Problems Sister     Cancer Maternal Uncle     Cancer Maternal Grandfather         Subjective:      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 Per patient condition started around 19 years PTC. Worse because it is more intense or frequent. S/P hysterectomy last year. Wake up with the headache or may wake her up. Right temporo-parietal radiates the neck, scapula or shoulder. Sharp and like a knife sticking. 1/10 to a at its worst a 10/10. Associated with blurring of vision, light and noise sensitivity, nausea and vomiting. Lasts 4 hours to as long as 4 to 5 days. Occurs 4 to 5 times a week. >20 headaches per month. Last severe headache was yesterday. Takes Fioricet and Zofran which dulls it down but not take it away  ER [facility] the [facility]    She does not take any daily maintenance medication for the headaches. Previously saw a neurologist around 17 years ago and had occipital nerve block done without relief. Does desk and computer work. Review of records here revealed a head CT without contrast done 11/19/2012 for migraine headaches revealed no acute abnormality. Cervical spine x-ray done 3/1/2011 was normal.    CBC, CMP done by PCP 5/13/2019 was unremarkable. Patient was last seen by her PCP 7/1/2019 for abdominal pain and headache. Plan for migraine was to see us. Patient prescribed trazodone 50 mg at bedtime for insomnia.     Medications previously tried for headaches: Ibuprofen, Advil, Aleve, aspirin, Tylenol, Excedrin, Tylenol 3, Toradol, Fioricet, Imitrex, Darvocet, Vicodin, Percocet, oxycodone, Zofran, Lopressor, Depakote, amitriptyline, Flexeril, Flonase, Prilosec, Cymbalta, Skelaxin, Lexapro, Effexor, magnesium, Celebrex, prednisone, Xanax, Benadryl, lidocaine, melatonin, Paxil, Wellbutrin, Lyrica, antihistamine    Outside reports reviewed: office notes, ER records, radiology reports, lab reports. Review of Systems:    A comprehensive review of systems was performed:   Constitutional: positive for fatigue, poor appetite  Eyes: positive for none  Ears, nose, mouth, throat, and face: positive for none  Respiratory: positive for none  Cardiovascular: positive for none  Gastrointestinal: positive for abdominal pain  Genitourinary: positive for none  Integument/breast: positive for none  Hematologic/lymphatic: positive for none  Musculoskeletal: positive for none  Neurological: positive for headaches, dizziness  Behavioral/Psych: positive for anxiety, depression  Endocrine: positive for none  Allergic/Immunologic: positive for none      Objective:     Visit Vitals  BP 96/62   Pulse 82   Ht 5' 1\" (1.549 m)   Wt 145 lb (65.8 kg)   LMP 06/10/2017   SpO2 98%   BMI 27.40 kg/m²     3/10 headache    PHYSICAL EXAM:    General Appearance: Alert, patient appears stated age. General:  Overweight, patient in no apparent distress. Head/Face: The head is normocephalic and atraumatic. Eyes: Conjunctivae appear normal. Sclera appear normal and non-icteric. Nose (and Sinus):   No abnormality of the nose or sinuses is noted. Oral:   Throat is clear. Lymphatics:  No lymphadenopathy in the neck/head. Neck and Thyroid:   No bruits noted in the neck. Respiratory:  Lungs clear to auscultation. Cardiovascular:  Palpation and auscultation: regular rate and rhythm. Extremity: No joint swelling, erythema or pedal edema. NEUROLOGICAL EXAM:    Appearance: The patient is well developed, well nourished, provides a coherent history and is in no acute distress. Mental Status: Oriented to time, place and person. Fluent, no aphasia or dysarthria. Mood and affect appropriate. Cranial Nerves:   Intact visual fields.  CHARLEY, EOM's full, no nystagmus, no ptosis. Facial sensation is normal. Corneal reflexes are intact. Facial movement is symmetric. Hearing is normal bilaterally. Palate is midline with normal elevation. Sternocleidomastoid and trapezius muscles are normal. Tongue is midline. Motor:  5/5 strength in upper and lower proximal and distal muscles. Normal bulk and tone. No fasciculations. No pronator drift. Reflexes:   Deep tendon reflexes 2+/4 and symmetrical. Downgoing toes. Sensory:   Normal to cold, pinprick and vibration. Gait:  Normal gait. No Romberg. Can do tandem walking. Tremor:   No tremor noted. Cerebellar:  Intact FTN/LA/HTS. Neurovascular:  Normal heart sounds and regular rhythm, peripheral pulses intact, and no carotid bruits. Cervical range of motion measurement:       Degrees   Head flexion   80   Head extension  42   Right lateral head flexion 37   Left lateral head flexion 47   Right head rotation  85   Left head rotation  85    Mild restriction head extension and right lateral head flexion. Severe anterior head posture (3-4 FB off shoulder) with shoulders rotated in; increase right SCM  (+) tenderness on palpation right occiput    Imaging  CT Head: reviewed    Labs Reviewed      Assessment:   Chronic intractable migraine without aura  Right-sided occipital neuralgia  Cervicogenic headaches    Plan:   Neurological examination is nonfocal.  Previous head CT did not reveal any acute pathology. No indication currently to do any other neuroimaging. Worse headaches consistent with migraines. Likely triggered by neck and shoulder issues. Trial of antispasm medication as maintenance therapy for now. Potential future trial of medications for maintenance therapy typically given for migraines. Patient is also certainly a candidate for CGRP medications for maintenance therapy.   Given the patient also has greater than 20 headaches per month, if no response to treatment is seen then trial of chemodenervation with Botox using the chronic migraine protocol will be done. Trial of Relpax 40 mg as needed for abortive therapy. Prescriptions provided. A trial of ketorolac 10 mg every 6 hours x5 days break her current cycle of headaches. Prescriptions provided. Advised to refrain from things that could trigger a headache (dairy, chocolate, wine and etc.). Advised to keep a headache log. Examined history also reveals elements of occipital neuralgia triggering her migraine headaches due to poor anterior head posture. Correct head posture. Trial of baclofen 10 mg at bedtime initially and up to 10 mg 3 times daily if necessary. Prescriptions provided. Further titration will depend upon response and tolerability. Ideally patient should have occipital nerve blocks but this is not covered by her insurance. Exam also reveals elements of cervicogenic headaches due to poor anterior head posture. Range of motion measurements revealed mild restriction head extension and right lateral head flexion. Discussed correcting her anterior head posture. Use headrest at work, at home and while driving. Use wireless headsets. Do not carry anything the shoulder. Use at most one pillow when sleeping. Patient was given brochure for neck and shoulder exercises to do. Patient was referred to physical therapy for more aggressive manipulation including dry needling. All questions and concerns were answered. This note was created using voice recognition software. Despite editing, there may be syntax errors.

## 2019-07-30 NOTE — PATIENT INSTRUCTIONS
Migraine Headache: Care Instructions  Your Care Instructions  Migraines are painful, throbbing headaches that often start on one side of the head. They may cause nausea and vomiting and make you sensitive to light, sound, or smell. Without treatment, migraines can last from 4 hours to a few days. Medicines can help prevent migraines or stop them after they have started. Your doctor can help you find which ones work best for you. Follow-up care is a key part of your treatment and safety. Be sure to make and go to all appointments, and call your doctor if you are having problems. It's also a good idea to know your test results and keep a list of the medicines you take. How can you care for yourself at home? · Do not drive if you have taken a prescription pain medicine. · Rest in a quiet, dark room until your headache is gone. Close your eyes, and try to relax or go to sleep. Don't watch TV or read. · Put a cold, moist cloth or cold pack on the painful area for 10 to 20 minutes at a time. Put a thin cloth between the cold pack and your skin. · Use a warm, moist towel or a heating pad set on low to relax tight shoulder and neck muscles. · Have someone gently massage your neck and shoulders. · Take your medicines exactly as prescribed. Call your doctor if you think you are having a problem with your medicine. You will get more details on the specific medicines your doctor prescribes. · Be careful not to take pain medicine more often than the instructions allow. You could get worse or more frequent headaches when the medicine wears off. To prevent migraines  · Keep a headache diary so you can figure out what triggers your headaches. Avoiding triggers may help you prevent headaches. Record when each headache began, how long it lasted, and what the pain was like.  (Was it throbbing, aching, stabbing, or dull?) Write down any other symptoms you had with the headache, such as nausea, flashing lights or dark spots, or sensitivity to bright light or loud noise. Note if the headache occurred near your period. List anything that might have triggered the headache. Triggers may include certain foods (chocolate, cheese, wine) or odors, smoke, bright light, stress, or lack of sleep. · If your doctor has prescribed medicine for your migraines, take it as directed. You may have medicine that you take only when you get a migraine and medicine that you take all the time to help prevent migraines. ? If your doctor has prescribed medicine for when you get a headache, take it at the first sign of a migraine, unless your doctor has given you other instructions. ? If your doctor has prescribed medicine to prevent migraines, take it exactly as prescribed. Call your doctor if you think you are having a problem with your medicine. · Find healthy ways to deal with stress. Migraines are most common during or right after stressful times. Take time to relax before and after you do something that has caused a migraine in the past.  · Try to keep your muscles relaxed by keeping good posture. Check your jaw, face, neck, and shoulder muscles for tension. Try to relax them. When you sit at a desk, change positions often. And make sure to stretch for 30 seconds each hour. · Get plenty of sleep and exercise. · Eat meals on a regular schedule. Avoid foods and drinks that often trigger migraines. These include chocolate, alcohol (especially red wine and port), aspartame, monosodium glutamate (MSG), and some additives found in foods (such as hot dogs, samuel, cold cuts, aged cheeses, and pickled foods). · Limit caffeine. Don't drink too much coffee, tea, or soda. But don't quit caffeine suddenly. That can also give you migraines. · Do not smoke or allow others to smoke around you. If you need help quitting, talk to your doctor about stop-smoking programs and medicines. These can increase your chances of quitting for good.   · If you are taking birth control pills or hormone therapy, talk to your doctor about whether they are triggering your migraines. When should you call for help? Call 911 anytime you think you may need emergency care. For example, call if:    · You have signs of a stroke. These may include:  ? Sudden numbness, paralysis, or weakness in your face, arm, or leg, especially on only one side of your body. ? Sudden vision changes. ? Sudden trouble speaking. ? Sudden confusion or trouble understanding simple statements. ? Sudden problems with walking or balance. ? A sudden, severe headache that is different from past headaches.    Call your doctor now or seek immediate medical care if:    · You have new or worse nausea and vomiting.     · You have a new or higher fever.     · Your headache gets much worse.    Watch closely for changes in your health, and be sure to contact your doctor if:    · You are not getting better after 2 days (48 hours). Where can you learn more? Go to http://manuel-rody.info/. Enter M728 in the search box to learn more about \"Migraine Headache: Care Instructions. \"  Current as of: March 28, 2019  Content Version: 12.1  © 5190-5858 [employer]. Care instructions adapted under license by [employer] (which disclaims liability or warranty for this information). If you have questions about a medical condition or this instruction, always ask your healthcare professional. [address] any warranty or liability for your use of this information. Neck: Exercises  Introduction  Here are some examples of exercises for you to try. The exercises may be suggested for a condition or for rehabilitation. Start each exercise slowly. Ease off the exercises if you start to have pain. You will be told when to start these exercises and which ones will work best for you. How to do the exercises  Neck stretch    1.  This stretch works best if you keep your shoulder down as you lean away from it. To help you remember to do this, start by relaxing your shoulders and lightly holding on to your thighs or your chair. 2. Tilt your head toward your shoulder and hold for 15 to 30 seconds. Let the weight of your head stretch your muscles. 3. If you would like a little added stretch, use your hand to gently and steadily pull your head toward your shoulder. For example, keeping your right shoulder down, lean your head to the left. 4. Repeat 2 to 4 times toward each shoulder. Diagonal neck stretch    1. Turn your head slightly toward the direction you will be stretching, and tilt your head diagonally toward your chest and hold for 15 to 30 seconds. 2. If you would like a little added stretch, use your hand to gently and steadily pull your head forward on the diagonal.  3. Repeat 2 to 4 times toward each side. Dorsal glide stretch    1. Sit or stand tall and look straight ahead. 2. Slowly tuck your chin as you glide your head backward over your body  3. Hold for a count of 6, and then relax for up to 10 seconds. 4. Repeat 8 to 12 times. Chest and shoulder stretch    1. Sit or stand tall and glide your head backward as in the dorsal glide stretch. 2. Raise both arms so that your hands are next to your ears. 3. Take a deep breath, and as you breathe out, lower your elbows down and behind your back. You will feel your shoulder blades slide down and together, and at the same time you will feel a stretch across your chest and the front of your shoulders. 4. Hold for about 6 seconds, and then relax for up to 10 seconds. 5. Repeat 8 to 12 times. Strengthening: Hands on head    1. Move your head backward, forward, and side to side against gentle pressure from your hands, holding each position for about 6 seconds. 2. Repeat 8 to 12 times. Follow-up care is a key part of your treatment and safety.  Be sure to make and go to all appointments, and call your doctor if you are having problems. It's also a good idea to know your test results and keep a list of the medicines you take. Where can you learn more? Go to http://manuel-rody.info/. Enter P975 in the search box to learn more about \"Neck: Exercises. \"  Current as of: September 20, 2018  Content Version: 12.1  © 2258-1000 [employer]. Care instructions adapted under license by [employer] (which disclaims liability or warranty for this information). If you have questions about a medical condition or this instruction, always ask your healthcare professional. [place] [address] any warranty or liability for your use of this information. Shoulder Blade: Exercises  Your Care Instructions  Here are some examples of typical exercises for your condition. Start each exercise slowly. Ease off the exercise if you start to have pain. Your doctor or physical therapist will tell you when you can start these exercises and which ones will work best for you. How to do the exercises  Shoulder roll    1. Stand tall with your chin slightly tucked. Imagine that a string at the top of your head is pulling you straight up. 2. Keep your arms relaxed. All motion will be in your shoulders. 3. Shrug your shoulders up toward your ears, then up and back. [place] your shoulders down and back, like you're sliding your hands down into your back pants pockets. 4. Repeat the circles at least 2 to 4 times. 5. This exercise is also helpful anytime you want to relax. Lower neck and upper back stretch    1. With your arms about shoulder height, clasp your hands in front of you. 2. Drop your chin toward your chest.  3. Reach straight forward so you are rounding your upper back. Think about pulling your shoulder blades apart. [place] feel a stretch across your upper back and shoulders. Hold for at least 6 seconds. 4. Repeat 2 to 4 times. Triceps stretch    1.  Reach your arm straight up.  2. Keeping your elbow in place, bend your arm and reach your hand down behind your back. 3. With your other hand, apply gentle pressure to the bent elbow. Maximino Griffith feel a stretch at the back of your upper arm and shoulder. Hold about 6 seconds. 4. Repeat 2 to 4 times with each arm. Shoulder stretch    1. Relax your shoulders. 2. Raise one arm to shoulder height, and reach it across your chest.  3. Pull the arm slightly toward you with your other arm. This will help you get a gentle stretch. Hold for about 6 seconds. 4. Repeat 2 to 4 times. Shoulder blade squeeze    1. Sit or stand up tall with your arms at your sides. 2. Keep your shoulders relaxed and down, not shrugged. 3. Squeeze your shoulder blades together. Hold for 6 seconds, then relax. 4. Repeat 8 to 12 times. Straight-arm shoulder blade squeeze    1. Sit or stand tall. Relax your shoulders. 2. With palms down, hold your elastic tubing or band straight out in front of you. 3. Start with slight tension in the tubing or band, with your hands about shoulder-width apart. 4. Slowly pull straight out to the sides, squeezing your shoulder blades together. Keep your arms straight and at shoulder height. Slowly release. 5. Repeat 8 to 12 times. Rowing    1. [place] your elastic tubing or band at about waist height. Take one end in each hand. 2. Sit or stand with your feet hip-width apart. 3. Hold your arms straight in front of you. Adjust your distance to create slight tension in the tubing or band. 4. Slightly tuck your chin. Relax your shoulders. 5. Without shrugging your shoulders, pull straight back. Your elbows will pass alongside your waist.    Pull-downs    1. Spivey your elastic tubing or band in the top of a closed door. Take one end in each hand. 2. Either sit or stand, depending on what is more comfortable. If you feel unsteady, sit on a chair. 3. Start with your arms up and comfortably apart, elbows straight.  There should be a slight tension in the tubing or band. 4. Slightly tuck your chin, and look straight ahead. 5. Keeping your back straight, slowly pull down and back, bending your elbows. 6. Stop where your hands are level with your chin, in a \"goalpost\" position. 7. Repeat 8 to 12 times. Chest T stretch    1. Lie on your back. Raise your knees so they are bent. Plant your feet on the floor, hip-width apart. 2. Tuck your chin, and relax your shoulders. 3. Reach your arms straight out to the sides. If you don't feel a mild stretch in your shoulders and across your chest, use a foam roll or a tightly rolled blanket under your spine, from your tailbone to your head. 4. Relax in this position for at least 15 to 30 seconds while you breathe normally. Repeat 2 to 4 times. 5. As you get used to this stretch, keep adding a little more time until you are able relax in this position for 2 or 3 minutes. When you can relax for at least 2 minutes, you only need to do the exercise 1 time per session. Chest goalpost stretch    1. Lie on your back. Raise your knees so they are bent. Plant your feet on the floor, hip-width apart. 2. Tuck your chin, and relax your shoulders. 3. Reach your arms straight out to the sides. 4. Bend your arms at the elbows, with your hands pointed toward the top of your head. Your arms should make an L on either side of your head. Your palms should be facing up. 5. If you don't feel a mild stretch in your shoulders and across your chest, use a foam roll or tightly rolled blanket under your spine, from your tailbone to your head. 6. Relax in this position for at least 15 to 30 seconds while you breathe normally. Repeat 2 to 4 times. 7. Each day you do this exercise, add a little more time until you can relax in this position for 2 or 3 minutes. When you can relax for at least 2 minutes, you only need to do the exercise 1 time per session. Follow-up care is a key part of your treatment and safety. Be sure to make and go to all appointments, and call your doctor if you are having problems. It's also a good idea to know your test results and keep a list of the medicines you take. Where can you learn more? Go to http://manuel-rody.info/. Enter [phone] in the search box to learn more about \"Shoulder Blade: Exercises. \"  Current as of: September 20, 2018  Content Version: 12.1  © 2729-9865 [employer]. Care instructions adapted under license by [employer] (which disclaims liability or warranty for this information). If you have questions about a medical condition or this instruction, always ask your healthcare professional. [address] any warranty or liability for your use of this information.

## 2019-08-02 ENCOUNTER — TELEPHONE (OUTPATIENT)
Dept: NEUROLOGY | Age: 44
End: 2019-08-02

## 2019-08-02 ENCOUNTER — HOSPITAL ENCOUNTER (EMERGENCY)
Age: 44
Discharge: HOME | End: 2019-08-02
Attending: EMERGENCY MEDICINE
Payer: COMMERCIAL

## 2019-08-02 VITALS
TEMPERATURE: 97.9 F | SYSTOLIC BLOOD PRESSURE: 125 MMHG | BODY MASS INDEX: 28.01 KG/M2 | OXYGEN SATURATION: 99 % | HEIGHT: 61 IN | RESPIRATION RATE: 16 BRPM | HEART RATE: 72 BPM | DIASTOLIC BLOOD PRESSURE: 85 MMHG | WEIGHT: 148.37 LBS

## 2019-08-02 DIAGNOSIS — R11.0 NAUSEA WITHOUT VOMITING: ICD-10-CM

## 2019-08-02 DIAGNOSIS — G43.111 INTRACTABLE MIGRAINE WITH AURA WITH STATUS MIGRAINOSUS: Primary | ICD-10-CM

## 2019-08-02 LAB
ANION GAP SERPL CALC-SCNC: 7 MMOL/L (ref 5–15)
BASOPHILS # BLD: 0.1 K/UL (ref 0–0.1)
BASOPHILS NFR BLD: 1 % (ref 0–1)
BUN SERPL-MCNC: 12 MG/DL (ref 6–20)
BUN/CREAT SERPL: 15 (ref 12–20)
CALCIUM SERPL-MCNC: 9 MG/DL (ref 8.5–10.1)
CHLORIDE SERPL-SCNC: 109 MMOL/L (ref 97–108)
CO2 SERPL-SCNC: 26 MMOL/L (ref 21–32)
CREAT SERPL-MCNC: 0.8 MG/DL (ref 0.55–1.02)
DIFFERENTIAL METHOD BLD: ABNORMAL
EOSINOPHIL # BLD: 0.3 K/UL (ref 0–0.4)
EOSINOPHIL NFR BLD: 5 % (ref 0–7)
ERYTHROCYTE [DISTWIDTH] IN BLOOD BY AUTOMATED COUNT: 12.5 % (ref 11.5–14.5)
GLUCOSE SERPL-MCNC: 118 MG/DL (ref 65–100)
HCT VFR BLD AUTO: 33.1 % (ref 35–47)
HGB BLD-MCNC: 11.5 G/DL (ref 11.5–16)
IMM GRANULOCYTES # BLD AUTO: 0 K/UL (ref 0–0.04)
IMM GRANULOCYTES NFR BLD AUTO: 0 % (ref 0–0.5)
LYMPHOCYTES # BLD: 2.5 K/UL (ref 0.8–3.5)
LYMPHOCYTES NFR BLD: 42 % (ref 12–49)
MCH RBC QN AUTO: 31.4 PG (ref 26–34)
MCHC RBC AUTO-ENTMCNC: 34.7 G/DL (ref 30–36.5)
MCV RBC AUTO: 90.4 FL (ref 80–99)
MONOCYTES # BLD: 0.3 K/UL (ref 0–1)
MONOCYTES NFR BLD: 5 % (ref 5–13)
NEUTS SEG # BLD: 2.8 K/UL (ref 1.8–8)
NEUTS SEG NFR BLD: 47 % (ref 32–75)
NRBC # BLD: 0 K/UL (ref 0–0.01)
NRBC BLD-RTO: 0 PER 100 WBC
PLATELET # BLD AUTO: 188 K/UL (ref 150–400)
PMV BLD AUTO: 11.3 FL (ref 8.9–12.9)
POTASSIUM SERPL-SCNC: 3.8 MMOL/L (ref 3.5–5.1)
RBC # BLD AUTO: 3.66 M/UL (ref 3.8–5.2)
SODIUM SERPL-SCNC: 142 MMOL/L (ref 136–145)
WBC # BLD AUTO: 6 K/UL (ref 3.6–11)

## 2019-08-02 PROCEDURE — 99284 EMERGENCY DEPT VISIT MOD MDM: CPT

## 2019-08-02 PROCEDURE — 74011250637 HC RX REV CODE- 250/637: Performed by: PHYSICIAN ASSISTANT

## 2019-08-02 PROCEDURE — 36415 COLL VENOUS BLD VENIPUNCTURE: CPT

## 2019-08-02 PROCEDURE — 96375 TX/PRO/DX INJ NEW DRUG ADDON: CPT

## 2019-08-02 PROCEDURE — 74011250636 HC RX REV CODE- 250/636: Performed by: PHYSICIAN ASSISTANT

## 2019-08-02 PROCEDURE — 85025 COMPLETE CBC W/AUTO DIFF WBC: CPT

## 2019-08-02 PROCEDURE — 80048 BASIC METABOLIC PNL TOTAL CA: CPT

## 2019-08-02 PROCEDURE — 96374 THER/PROPH/DIAG INJ IV PUSH: CPT

## 2019-08-02 RX ORDER — DIAZEPAM 5 MG/1
5 TABLET ORAL
Status: COMPLETED | OUTPATIENT
Start: 2019-08-02 | End: 2019-08-02

## 2019-08-02 RX ORDER — ONDANSETRON 2 MG/ML
4 INJECTION INTRAMUSCULAR; INTRAVENOUS
Status: COMPLETED | OUTPATIENT
Start: 2019-08-02 | End: 2019-08-02

## 2019-08-02 RX ORDER — DEXAMETHASONE SODIUM PHOSPHATE 4 MG/ML
10 INJECTION, SOLUTION INTRA-ARTICULAR; INTRALESIONAL; INTRAMUSCULAR; INTRAVENOUS; SOFT TISSUE
Status: COMPLETED | OUTPATIENT
Start: 2019-08-02 | End: 2019-08-02

## 2019-08-02 RX ORDER — MORPHINE SULFATE 2 MG/ML
2 INJECTION, SOLUTION INTRAMUSCULAR; INTRAVENOUS
Status: COMPLETED | OUTPATIENT
Start: 2019-08-02 | End: 2019-08-02

## 2019-08-02 RX ADMIN — ONDANSETRON 4 MG: 2 INJECTION INTRAMUSCULAR; INTRAVENOUS at 15:56

## 2019-08-02 RX ADMIN — MORPHINE SULFATE 2 MG: 2 INJECTION, SOLUTION INTRAMUSCULAR; INTRAVENOUS at 15:56

## 2019-08-02 RX ADMIN — DIAZEPAM 5 MG: 5 TABLET ORAL at 15:56

## 2019-08-02 RX ADMIN — DEXAMETHASONE SODIUM PHOSPHATE 10 MG: 4 INJECTION, SOLUTION INTRA-ARTICULAR; INTRALESIONAL; INTRAMUSCULAR; INTRAVENOUS; SOFT TISSUE at 17:20

## 2019-08-02 NOTE — DISCHARGE INSTRUCTIONS
Rest, push fluids, follow up with your Neurologist for recheck at your earliest opportunity. Return to the Emergency Dept for any worsening headache, visual changes, confusion, or problems with speech/ambulation.

## 2019-08-02 NOTE — ED NOTES
Tal Nieto has reviewed discharge instructions with the patient. The patient verbalized understanding. Pt ambulated out of department in no apparent distress. VSS.

## 2019-08-02 NOTE — TELEPHONE ENCOUNTER
Re: Relpax    Patient presented to office inquiring about her Relpax -     I called pharmacy - it requires P. A. However, I had not received it. Set up PA via CM to [facility]  - unable to process. Called [facility] submitted over the phone. Case [id number] marked urgent -   24 hr turnaround. Number to call for status check is [phone]. This information relayed to patient by Saritha Hood.

## 2019-08-02 NOTE — ED NOTES
Pt presents c/o migraine worsening since yesterday. Pt c/o sensitivity to light and sound as well as blurry vision. Pt sts migraine is right sided and feels like typical migraine, but pt was unable to control pain at home. Pt sts \"[place]'t be here right now. \".  Pt a/ox4

## 2019-08-02 NOTE — TELEPHONE ENCOUNTER
Before I could call patient presented in office crying stating she was having a terrible migraine, the toradol wasn't working, and that she couldn't get the Relpax from the pharmacy. I spoke with George Flowers and she started a PA on it and it will take 24hrs before we have an answer. Patient stated the headache was too severe and she needed something now so she was going to the ED. I told her I understood and to keep me updated.

## 2019-08-02 NOTE — ED PROVIDER NOTES
EMERGENCY DEPARTMENT HISTORY AND PHYSICAL EXAM      Date: 8/2/2019  Patient Name: Araceli White    History of Presenting Illness     Chief Complaint   Patient presents with    Headache     pt reports headache beginning yesterday, history of migraines, went to neurology office however MD is out today, sent to ED       History Provided By: Patient    HPI: Araceli White, 38 y.o. female presents to the Emergency Dept with c/o intractable headache since 8/1/19. Pt with long h/o chronic migraines. She sees a Neurologist with [facility] \"but they are on vacation, out of the country\". She reports her pain is c/w migraines she has had in the past.  She denied injury to her head. No sinus congestion/pressure. No fever/chills. No neck pain or stiffness. +photophobia and nausea. She has attempted treatment at home with Toradol, benadryl and antiemetic without relief. She denied confusion, weakness, or problems with speech/ambulation. Chief Complaint: headache  Duration: 1 Days  Timing:  Acute on chronic  Location: head  Quality: Aching  Severity: Moderate to severe  Modifying Factors: long h/o migraines, has attempted non-narcotic options prior to arrival  Associated Symptoms: denies any other associated signs or symptoms        There are no other complaints, changes, or physical findings at this time. PCP: Bernard Jamil MD    Current Facility-Administered Medications   Medication Dose Route Frequency Provider Last Rate Last Dose    dexamethasone (DECADRON) 4 mg/mL injection 10 mg  10 mg IntraVENous NOW [place], [place]         Current Outpatient Medications   Medication Sig Dispense Refill    omeprazole (PRILOSEC) 20 mg capsule Take 20 mg by mouth daily.  cetirizine-pseudoePHEDrine (ZYRTEC-D) 5-120 mg Tb12 Take 1 Tab by mouth two (2) times a day.  Magnesium Oxide 500 mg cap Take  by mouth.       ferrous sulfate (IRON) 325 mg (65 mg iron) tablet Take  by mouth Daily (before breakfast).  potassium 99 mg tablet Take 99 mg by mouth daily.  zinc 50 mg tab tablet Take  by mouth daily.  cyanocobalamin (VITAMIN B12) 500 mcg tablet Take 500 mcg by mouth daily.  acetaminophen (TYLENOL EXTRA STRENGTH) 500 mg tablet Take 1,000 mg by mouth.  baclofen (LIORESAL) 10 mg tablet Take 1 tab at bedtime x 1 wk; then 1 tab BID x 1 wk; then 1 tab TID 90 Tab 1    eletriptan (RELPAX) 40 mg tablet Take 1 Tab by mouth daily as needed (headache). 9 Tab 1    ketorolac (TORADOL) 10 mg tablet Take 1 Tab by mouth every six (6) hours for 5 days. 20 Tab 0    butalbital-acetaminophen-caffeine (FIORICET, ESGIC) -40 mg per tablet TAKE 1 TABLET BY MOUTH EVERY 6 HOURS AS NEEDED FOR PAIN 60 Tab 0    ALPRAZolam (XANAX) 0.5 mg tablet Take 1 Tab by mouth two (2) times daily as needed for Anxiety. Max Daily Amount: 1 mg. 60 Tab 3    sertraline (ZOLOFT) 100 mg tablet TAKE 1 TABLET BY MOUTH ONCE DAILY 30 Tab 1    albuterol-ipratropium (DUO-NEB) 2.5 mg-0.5 mg/3 ml nebu USE 1 VIAL VIA NEBULIZER EVERY 6 HOURS AS NEEDED 180 mL 0    albuterol (PROVENTIL HFA) 90 mcg/actuation inhaler Take 2 Puffs by inhalation every four (4) hours as needed for Wheezing. 1 Inhaler 3    chlorpheniramine-HYDROcodone (TUSSIONEX) 10-8 mg/5 mL suspension Take 5 mL by mouth every twelve (12) hours as needed for Cough. Max Daily Amount: 10 mL. 115 mL 0    aspirin 81 mg chewable tablet Take 81 mg by mouth daily.          Past History     Past Medical History:  Past Medical History:   Diagnosis Date    Anxiety     Asthma     Depression     Factor V deficiency ([facility]     Heart rate fast     Long term current use of anticoagulant therapy     Migraine     Thromboembolus ([facility] 2006    DVT left groin       Past Surgical History:  Past Surgical History:   Procedure Laterality Date    ABDOMEN SURGERY PROC UNLISTED      laproscopy removed adhesions on colon    DILATION AND CURETTAGE      [address] HX  SECTION      HX HERNIA REPAIR  2019     [facility] incarcerated ventral hernia repair    HX HYSTERECTOMY  2018    HX ORTHOPAEDIC  2009    bone removed from foot    HX TUBAL LIGATION  c sec       Family History:  Family History   Problem Relation Age of Onset    Diabetes Mother     Alzheimer Mother     Hypertension Mother     High Cholesterol Mother     Headache Mother     Heart Disease Mother     Cancer Father     Headache Father     Heart Disease Father     Diabetes Sister     No Known Problems Brother     Immunodeficiency Sister     No Known Problems Sister     Cancer Maternal Uncle     Cancer Maternal Grandfather        Social History:  Social History     Tobacco Use    Smoking status: Former Smoker     Packs/day: 0.50     Years: 21.00     Pack years: 10.50     Types: Cigarettes     Last attempt to quit: 3/5/2016     Years since quitting: 3.4    Smokeless tobacco: Never Used   Substance Use Topics    Alcohol use: Yes     Alcohol/week: 0.0 standard drinks     Comment: Occ    Drug use: No       Allergies: Allergies   Allergen Reactions    Adhesive Tape-Silicones Other (comments)     Pt states this burns her skin. Review of Systems   Review of Systems   Constitutional: Negative for chills and fever. HENT: Negative for congestion, rhinorrhea and sore throat. Eyes: Positive for photophobia. Negative for discharge, redness and visual disturbance. Respiratory: Negative for cough and shortness of breath. Cardiovascular: Negative for chest pain and palpitations. Gastrointestinal: Positive for nausea. Negative for diarrhea and vomiting. Endocrine: Negative for polydipsia, polyphagia and polyuria. Genitourinary: Negative for dysuria and hematuria. Musculoskeletal: Negative for arthralgias, neck pain and neck stiffness. Skin: Negative for rash and wound. Allergic/Immunologic: Negative for food allergies and immunocompromised state.    Neurological: Positive for headaches. Negative for dizziness and weakness. Hematological: Negative for adenopathy. Does not bruise/bleed easily. Psychiatric/Behavioral: Negative for agitation and confusion. Physical Exam   Physical Exam   Constitutional: She is oriented to person, place, and time. She appears well-developed and well-nourished. WDWN female, alert, in moderate discomfort, +photophobia   HENT:   Head: Normocephalic and atraumatic. Nose: Nose normal.   Mouth/Throat: Oropharynx is clear and moist. No oropharyngeal exudate. Eyes: Pupils are equal, round, and reactive to light. Conjunctivae and EOM are normal. Right eye exhibits no discharge. Left eye exhibits no discharge. No scleral icterus. Neck: Normal range of motion. Neck supple. No JVD present. No tracheal deviation present. No thyromegaly present. No nuchal rigidity   Cardiovascular: Normal rate, regular rhythm and normal heart sounds. Pulmonary/Chest: Effort normal and breath sounds normal. No respiratory distress. She has no wheezes. She exhibits no tenderness. Abdominal: Soft. There is no tenderness. No CVAT   Musculoskeletal: Normal range of motion. She exhibits no edema. Lymphadenopathy:     She has no cervical adenopathy. Neurological: She is alert and oriented to person, place, and time. No cranial nerve deficit. She exhibits normal muscle tone. Coordination normal.   FNF intact, no pronator drift,  5/5   Skin: Skin is warm and dry. No rash noted. She is not diaphoretic. No erythema. Psychiatric: She has a normal mood and affect. Her behavior is normal. Judgment normal.   Nursing note and vitals reviewed.       Diagnostic Study Results     Labs -     Recent Results (from the past 12 hour(s))   CBC WITH AUTOMATED DIFF    Collection Time: 08/02/19  2:42 PM   Result Value Ref Range    WBC 6.0 3.6 - 11.0 K/uL    RBC 3.66 (L) 3.80 - 5.20 M/uL    HGB 11.5 11.5 - 16.0 g/dL    HCT 33.1 (L) 35.0 - 47.0 %    MCV 90.4 80.0 - 99.0 FL    MCH 31.4 26.0 - 34.0 PG    MCHC 34.7 30.0 - 36.5 g/dL    RDW 12.5 11.5 - 14.5 %    PLATELET 710 733 - 547 K/uL    MPV 11.3 8.9 - 12.9 FL    NRBC 0.0 0  WBC    ABSOLUTE NRBC 0.00 0.00 - 0.01 K/uL    NEUTROPHILS 47 32 - 75 %    LYMPHOCYTES 42 12 - 49 %    MONOCYTES 5 5 - 13 %    EOSINOPHILS 5 0 - 7 %    BASOPHILS 1 0 - 1 %    IMMATURE GRANULOCYTES 0 0.0 - 0.5 %    ABS. NEUTROPHILS 2.8 1.8 - 8.0 K/UL    ABS. LYMPHOCYTES 2.5 0.8 - 3.5 K/UL    ABS. MONOCYTES 0.3 0.0 - 1.0 K/UL    ABS. EOSINOPHILS 0.3 0.0 - 0.4 K/UL    ABS. BASOPHILS 0.1 0.0 - 0.1 K/UL    ABS. IMM. GRANS. 0.0 0.00 - 0.04 K/UL    DF AUTOMATED     METABOLIC PANEL, BASIC    Collection Time: 08/02/19  2:42 PM   Result Value Ref Range    Sodium 142 136 - 145 mmol/L    Potassium 3.8 3.5 - 5.1 mmol/L    Chloride 109 (H) 97 - 108 mmol/L    CO2 26 21 - 32 mmol/L    Anion gap 7 5 - 15 mmol/L    Glucose 118 (H) 65 - 100 mg/dL    BUN 12 6 - 20 MG/DL    Creatinine 0.80 0.55 - 1.02 MG/DL    BUN/Creatinine ratio 15 12 - 20      GFR est AA >60 >60 ml/min/1.73m2    GFR est non-AA >60 >60 ml/min/1.73m2    Calcium 9.0 8.5 - 10.1 MG/DL       Radiologic Studies -   No orders to display         Medical Decision Making   I am the first provider for this patient. I reviewed the vital signs, available nursing notes, past medical history, past surgical history, family history and social history. Vital Signs-Reviewed the patient's vital signs. Patient Vitals for the past 12 hrs:   Temp Pulse Resp BP SpO2   08/02/19 1515  72 16 135/85 100 %   08/02/19 1341 97.9 °F (36.6 °C) 67 16 (!) 132/94 100 %         Records Reviewed: Nursing Notes, Old Medical Records, Previous Radiology Studies and Previous Laboratory Studies    Provider Notes (Medical Decision Making):   Migraine, tension headache    ED Course:   Initial assessment performed.  The patients presenting problems have been discussed, and they are in agreement with the care plan formulated and outlined with them.  I have encouraged them to ask questions as they arise throughout their visit. DISCHARGE NOTE:  The care plan has been outline with the patient and/or family, who verbally conveyed understanding and agreement. Available results have been reviewed. Patient and/or family understand the follow up plan as outlined and discharge instructions. Should their condition deterioration at any time after discharge the patient agrees to return, follow up sooner than outlined or seek medical assistance at the closest Emergency Room as soon as possible. Questions have been answered. Discharge instructions and educational information regarding the patient's diagnosis as well a list of reasons why the patient would want to seek immediate medical attention, should their condition change, were reviewed directly with the patient/family       PLAN:  1. Current Discharge Medication List        2. Follow-up Information     Follow up With Specialties Details Why Contact Info    Ravinder Pleitez MD [place] [address]  [phone]      Juan Miguel Patel MD Neurology   [address] [facility]  [address] [place] [address]  [phone]      [facility] EMERGENCY DEPT Emergency Medicine  If symptoms worsen [address]  [address] [place] [address]  [phone]        Return to ED if worse     Diagnosis     Clinical Impression:   1. Intractable migraine with aura with status migrainosus    2.  Nausea without vomiting

## 2019-08-02 NOTE — LETTER
[address] 
[facility] EMERGENCY DEPT 
[address] [place] [id number] [phone] Work/School Note Date: 8/2/2019 To Whom It May concern: 
 
Alethea Crowe was seen and treated today in the emergency room. She may return to work in 1 to 2 days, as symptoms improve. Sincerely, Juma Mclean

## 2019-08-02 NOTE — TELEPHONE ENCOUNTER
Received call from patient, she stated that her pharmacy sent over a fax saying that her insurance will not cover her migraine medication. She says that she is having a migraine right now and does not know what to do. I asked her if the medication required a prior auth, but she said no they won't cover it. She would like a call back please.       [phone]

## 2019-08-19 ENCOUNTER — OFFICE VISIT (OUTPATIENT)
Dept: FAMILY MEDICINE CLINIC | Age: 44
End: 2019-08-19

## 2019-08-19 VITALS
DIASTOLIC BLOOD PRESSURE: 87 MMHG | WEIGHT: 144 LBS | HEART RATE: 75 BPM | HEIGHT: 61 IN | RESPIRATION RATE: 20 BRPM | OXYGEN SATURATION: 100 % | SYSTOLIC BLOOD PRESSURE: 135 MMHG | TEMPERATURE: 97.9 F | BODY MASS INDEX: 27.19 KG/M2

## 2019-08-19 DIAGNOSIS — I73.9 PERIPHERAL VASCULAR DISEASE (HCC): ICD-10-CM

## 2019-08-19 DIAGNOSIS — S93.601A FOOT SPRAIN, RIGHT, INITIAL ENCOUNTER: Primary | ICD-10-CM

## 2019-08-19 RX ORDER — DICLOFENAC SODIUM 75 MG/1
75 TABLET, DELAYED RELEASE ORAL 2 TIMES DAILY WITH MEALS
Qty: 30 TAB | Refills: 1 | Status: SHIPPED | OUTPATIENT
Start: 2019-08-19 | End: 2020-01-23 | Stop reason: ALTCHOICE

## 2019-08-19 NOTE — PROGRESS NOTES
1. Have you been to the ER, urgent care clinic since your last visit? Hospitalized since your last visit? No    2. Have you seen or consulted any other health care providers outside of the [address] [place] [address] since your last visit? Include any pap smears or colon screening.  No

## 2019-08-19 NOTE — PROGRESS NOTES
HISTORY OF PRESENT ILLNESS  Pool [place] is a 38 y.o. female. twisted rt midfoot in a fall 2 days ago,ambulating with a cane and difficulty  Foot Pain   The history is provided by the patient. This is a new problem. The current episode started more than 2 days ago. The problem occurs daily. Review of Systems   Constitutional: Negative for fever. Musculoskeletal: Positive for joint pain. Skin: Negative for rash. Physical Exam   Constitutional: She appears well-developed and well-nourished. Ambulating with cane   Musculoskeletal:        Right ankle: Normal.        Right foot: There is decreased range of motion, tenderness, bony tenderness and swelling. Feet:    Tender metatarsal heads,mild swelling,minmal ecchymoses   Skin: Skin is warm and dry. ASSESSMENT and PLAN  Diagnoses and all orders for this visit:    1. Foot sprain, right, initial encounter,ice rest,elevation,ace wrap    -     XR FOOT RT MIN 3 V; Future  -     diclofenac EC (VOLTAREN) 75 mg EC tablet; Take 1 Tab by mouth two (2) times daily (with meals). 2. Peripheral vascular disease ([address]      Follow-up and Dispositions    · Return if symptoms worsen or fail to improve.

## 2019-08-19 NOTE — LETTER
NOTIFICATION OF RETURN TO WORK / SCHOOL 
 
8/19/2019 4:31 PM 
 
Ms. Alethea Crowe [address] [place], [place] [address]sha Swathi To Whom It May Concern: 
 
Alethea Crowe was under the care of [facility] from 8/19/19 She will be able to return to work/school on 8/20/19 
 with no restrictions. If there are questions or concerns please have the patient contact our office. Sincerely, Timoteo Shipman MD

## 2019-09-03 ENCOUNTER — OFFICE VISIT (OUTPATIENT)
Dept: NEUROLOGY | Age: 44
End: 2019-09-03

## 2019-09-03 VITALS
SYSTOLIC BLOOD PRESSURE: 114 MMHG | HEART RATE: 98 BPM | DIASTOLIC BLOOD PRESSURE: 72 MMHG | BODY MASS INDEX: 27 KG/M2 | OXYGEN SATURATION: 99 % | WEIGHT: 143 LBS | HEIGHT: 61 IN

## 2019-09-03 DIAGNOSIS — G43.719 CHRONIC MIGRAINE WITHOUT AURA, INTRACTABLE, WITHOUT STATUS MIGRAINOSUS: Primary | ICD-10-CM

## 2019-09-03 DIAGNOSIS — G44.86 CERVICOGENIC HEADACHE: ICD-10-CM

## 2019-09-03 DIAGNOSIS — S93.601D SPRAIN OF RIGHT FOOT, SUBSEQUENT ENCOUNTER: ICD-10-CM

## 2019-09-03 DIAGNOSIS — M54.81 OCCIPITAL NEURALGIA OF RIGHT SIDE: ICD-10-CM

## 2019-09-03 RX ORDER — NARATRIPTAN 2.5 MG/1
TABLET ORAL
Qty: 9 TAB | Refills: 1 | Status: SHIPPED | OUTPATIENT
Start: 2019-09-03 | End: 2019-10-17 | Stop reason: SDUPTHER

## 2019-09-03 NOTE — PATIENT INSTRUCTIONS
Neck Pain: Care Instructions  Your Care Instructions    You can have neck pain anywhere from the bottom of your head to the top of your shoulders. It can spread to the upper back or arms. Injuries, painting a ceiling, sleeping with your neck twisted, staying in one position for too long, and many other activities can cause neck pain. Most neck pain gets better with home care. Your doctor may recommend medicine to relieve pain or relax your muscles. He or she may suggest exercise and physical therapy to increase flexibility and relieve stress. You may need to wear a special (cervical) collar to support your neck for a day or two. Follow-up care is a key part of your treatment and safety. Be sure to make and go to all appointments, and call your doctor if you are having problems. It's also a good idea to know your test results and keep a list of the medicines you take. How can you care for yourself at home? · Try using a heating pad on a low or medium setting for 15 to 20 minutes every 2 or 3 hours. Try a warm shower in place of one session with the heating pad. · You can also try an ice pack for 10 to 15 minutes every 2 to 3 hours. Put a thin cloth between the ice and your skin. · Take pain medicines exactly as directed. ? If the doctor gave you a prescription medicine for pain, take it as prescribed. ? If you are not taking a prescription pain medicine, ask your doctor if you can take an over-the-counter medicine. · If your doctor recommends a cervical collar, wear it exactly as directed. When should you call for help? Call your doctor now or seek immediate medical care if:    · You have new or worsening numbness in your arms, buttocks or legs.     · You have new or worsening weakness in your arms or legs.  (This could make it hard to stand up.)     · You lose control of your bladder or bowels.    Watch closely for changes in your health, and be sure to contact your doctor if:    · Your neck pain is getting worse.     · You are not getting better after 1 week.     · You do not get better as expected. Where can you learn more? Go to http://manuel-rody.info/. Enter [id number] in the search box to learn more about \"Neck Pain: Care Instructions. \"  Current as of: September 20, 2018  Content Version: 12.1  © 9526-0822 [employer]. Care instructions adapted under license by [employer] (which disclaims liability or warranty for this information). If you have questions about a medical condition or this instruction, always ask your healthcare professional. [place] [address] any warranty or liability for your use of this information. Migraine Headache: Care Instructions  Your Care Instructions  Migraines are painful, throbbing headaches that often start on one side of the head. They may cause nausea and vomiting and make you sensitive to light, sound, or smell. Without treatment, migraines can last from 4 hours to a few days. Medicines can help prevent migraines or stop them after they have started. Your doctor can help you find which ones work best for you. Follow-up care is a key part of your treatment and safety. Be sure to make and go to all appointments, and call your doctor if you are having problems. It's also a good idea to know your test results and keep a list of the medicines you take. How can you care for yourself at home? · Do not drive if you have taken a prescription pain medicine. · Rest in a quiet, dark room until your headache is gone. Close your eyes, and try to relax or go to sleep. Don't watch TV or read. · Put a cold, moist cloth or cold pack on the painful area for 10 to 20 minutes at a time. Put a thin cloth between the cold pack and your skin. · Use a warm, moist towel or a heating pad set on low to relax tight shoulder and neck muscles. · Have someone gently massage your neck and shoulders.   · Take your medicines exactly as prescribed. Call your doctor if you think you are having a problem with your medicine. You will get more details on the specific medicines your doctor prescribes. · Be careful not to take pain medicine more often than the instructions allow. You could get worse or more frequent headaches when the medicine wears off. To prevent migraines  · Keep a headache diary so you can figure out what triggers your headaches. Avoiding triggers may help you prevent headaches. Record when each headache began, how long it lasted, and what the pain was like. (Was it throbbing, aching, stabbing, or dull?) Write down any other symptoms you had with the headache, such as nausea, flashing lights or dark spots, or sensitivity to bright light or loud noise. Note if the headache occurred near your period. List anything that might have triggered the headache. Triggers may include certain foods (chocolate, cheese, wine) or odors, smoke, bright light, stress, or lack of sleep. · If your doctor has prescribed medicine for your migraines, take it as directed. You may have medicine that you take only when you get a migraine and medicine that you take all the time to help prevent migraines. ? If your doctor has prescribed medicine for when you get a headache, take it at the first sign of a migraine, unless your doctor has given you other instructions. ? If your doctor has prescribed medicine to prevent migraines, take it exactly as prescribed. Call your doctor if you think you are having a problem with your medicine. · Find healthy ways to deal with stress. Migraines are most common during or right after stressful times. Take time to relax before and after you do something that has caused a migraine in the past.  · Try to keep your muscles relaxed by keeping good posture. Check your jaw, face, neck, and shoulder muscles for tension. Try to relax them. When you sit at a desk, change positions often.  And make sure to stretch for 30 seconds each hour. · Get plenty of sleep and exercise. · Eat meals on a regular schedule. Avoid foods and drinks that often trigger migraines. These include chocolate, alcohol (especially red wine and port), aspartame, monosodium glutamate (MSG), and some additives found in foods (such as hot dogs, samuel, cold cuts, aged cheeses, and pickled foods). · Limit caffeine. Don't drink too much coffee, tea, or soda. But don't quit caffeine suddenly. That can also give you migraines. · Do not smoke or allow others to smoke around you. If you need help quitting, talk to your doctor about stop-smoking programs and medicines. These can increase your chances of quitting for good. · If you are taking birth control pills or hormone therapy, talk to your doctor about whether they are triggering your migraines. When should you call for help? Call 911 anytime you think you may need emergency care. For example, call if:    · You have signs of a stroke. These may include:  ? Sudden numbness, paralysis, or weakness in your face, arm, or leg, especially on only one side of your body. ? Sudden vision changes. ? Sudden trouble speaking. ? Sudden confusion or trouble understanding simple statements. ? Sudden problems with walking or balance. ? A sudden, severe headache that is different from past headaches.    Call your doctor now or seek immediate medical care if:    · You have new or worse nausea and vomiting.     · You have a new or higher fever.     · Your headache gets much worse.    Watch closely for changes in your health, and be sure to contact your doctor if:    · You are not getting better after 2 days (48 hours). Where can you learn more? Go to http://manuel-rody.info/. Enter T772 in the search box to learn more about \"Migraine Headache: Care Instructions. \"  Current as of: March 28, 2019  Content Version: 12.1  © 3981-4879 [employer].  Care instructions adapted under license by [facility] (which disclaims liability or warranty for this information). If you have questions about a medical condition or this instruction, always ask your healthcare professional. [place] [address] any warranty or liability for your use of this information. Neck: Exercises  Introduction  Here are some examples of exercises for you to try. The exercises may be suggested for a condition or for rehabilitation. Start each exercise slowly. Ease off the exercises if you start to have pain. You will be told when to start these exercises and which ones will work best for you. How to do the exercises  Neck stretch    1. This stretch works best if you keep your shoulder down as you lean away from it. To help you remember to do this, start by relaxing your shoulders and lightly holding on to your thighs or your chair. 2. Tilt your head toward your shoulder and hold for 15 to 30 seconds. Let the weight of your head stretch your muscles. 3. If you would like a little added stretch, use your hand to gently and steadily pull your head toward your shoulder. For example, keeping your right shoulder down, lean your head to the left. 4. Repeat 2 to 4 times toward each shoulder. Diagonal neck stretch    1. Turn your head slightly toward the direction you will be stretching, and tilt your head diagonally toward your chest and hold for 15 to 30 seconds. 2. If you would like a little added stretch, use your hand to gently and steadily pull your head forward on the diagonal.  3. Repeat 2 to 4 times toward each side. Dorsal glide stretch    1. Sit or stand tall and look straight ahead. 2. Slowly tuck your chin as you glide your head backward over your body  3. Hold for a count of 6, and then relax for up to 10 seconds. 4. Repeat 8 to 12 times. Chest and shoulder stretch    1. Sit or stand tall and glide your head backward as in the dorsal glide stretch.   2. Raise both arms so that your hands are next to your ears. 3. Take a deep breath, and as you breathe out, lower your elbows down and behind your back. You will feel your shoulder blades slide down and together, and at the same time you will feel a stretch across your chest and the front of your shoulders. 4. Hold for about 6 seconds, and then relax for up to 10 seconds. 5. Repeat 8 to 12 times. Strengthening: Hands on head    1. Move your head backward, forward, and side to side against gentle pressure from your hands, holding each position for about 6 seconds. 2. Repeat 8 to 12 times. Follow-up care is a key part of your treatment and safety. Be sure to make and go to all appointments, and call your doctor if you are having problems. It's also a good idea to know your test results and keep a list of the medicines you take. Where can you learn more? Go to http://manuel-rody.info/. Enter P975 in the search box to learn more about \"Neck: Exercises. \"  Current as of: September 20, 2018  Content Version: 12.1  © 0572-7033 [employer]. Care instructions adapted under license by [employer] (which disclaims liability or warranty for this information). If you have questions about a medical condition or this instruction, always ask your healthcare professional. [address] any warranty or liability for your use of this information. Shoulder Blade: Exercises  Your Care Instructions  Here are some examples of typical exercises for your condition. Start each exercise slowly. Ease off the exercise if you start to have pain. Your doctor or physical therapist will tell you when you can start these exercises and which ones will work best for you. How to do the exercises  Shoulder roll    1. Stand tall with your chin slightly tucked. Imagine that a string at the top of your head is pulling you straight up. 2. Keep your arms relaxed. All motion will be in your shoulders.   3. Shrug your shoulders up toward your ears, then up and back. [place] your shoulders down and back, like you're sliding your hands down into your back pants pockets. 4. Repeat the circles at least 2 to 4 times. 5. This exercise is also helpful anytime you want to relax. Lower neck and upper back stretch    1. With your arms about shoulder height, clasp your hands in front of you. 2. Drop your chin toward your chest.  3. Reach straight forward so you are rounding your upper back. Think about pulling your shoulder blades apart. [place] feel a stretch across your upper back and shoulders. Hold for at least 6 seconds. 4. Repeat 2 to 4 times. Triceps stretch    1. Reach your arm straight up. 2. Keeping your elbow in place, bend your arm and reach your hand down behind your back. 3. With your other hand, apply gentle pressure to the bent elbow. [place] feel a stretch at the back of your upper arm and shoulder. Hold about 6 seconds. 4. Repeat 2 to 4 times with each arm. Shoulder stretch    1. Relax your shoulders. 2. Raise one arm to shoulder height, and reach it across your chest.  3. Pull the arm slightly toward you with your other arm. This will help you get a gentle stretch. Hold for about 6 seconds. 4. Repeat 2 to 4 times. Shoulder blade squeeze    1. Sit or stand up tall with your arms at your sides. 2. Keep your shoulders relaxed and down, not shrugged. 3. Squeeze your shoulder blades together. Hold for 6 seconds, then relax. 4. Repeat 8 to 12 times. Straight-arm shoulder blade squeeze    1. Sit or stand tall. Relax your shoulders. 2. With palms down, hold your elastic tubing or band straight out in front of you. 3. Start with slight tension in the tubing or band, with your hands about shoulder-width apart. 4. Slowly pull straight out to the sides, squeezing your shoulder blades together. Keep your arms straight and at shoulder height. Slowly release.   5. Repeat 8 to 12 times. Rowing    1. [place] your elastic tubing or band at about waist height. Take one end in each hand. 2. Sit or stand with your feet hip-width apart. 3. Hold your arms straight in front of you. Adjust your distance to create slight tension in the tubing or band. 4. Slightly tuck your chin. Relax your shoulders. 5. Without shrugging your shoulders, pull straight back. Your elbows will pass alongside your waist.    Pull-downs    1. [place] your elastic tubing or band in the top of a closed door. Take one end in each hand. 2. Either sit or stand, depending on what is more comfortable. If you feel unsteady, sit on a chair. 3. Start with your arms up and comfortably apart, elbows straight. There should be a slight tension in the tubing or band. 4. Slightly tuck your chin, and look straight ahead. 5. Keeping your back straight, slowly pull down and back, bending your elbows. 6. Stop where your hands are level with your chin, in a \"goalpost\" position. 7. Repeat 8 to 12 times. Chest T stretch    1. Lie on your back. Raise your knees so they are bent. Plant your feet on the floor, hip-width apart. 2. Tuck your chin, and relax your shoulders. 3. Reach your arms straight out to the sides. If you don't feel a mild stretch in your shoulders and across your chest, use a foam roll or a tightly rolled blanket under your spine, from your tailbone to your head. 4. Relax in this position for at least 15 to 30 seconds while you breathe normally. Repeat 2 to 4 times. 5. As you get used to this stretch, keep adding a little more time until you are able relax in this position for 2 or 3 minutes. When you can relax for at least 2 minutes, you only need to do the exercise 1 time per session. Chest goalpost stretch    1. Lie on your back. Raise your knees so they are bent. Plant your feet on the floor, hip-width apart. 2. Tuck your chin, and relax your shoulders.   3. Reach your arms straight out to the sides.  4. Bend your arms at the elbows, with your hands pointed toward the top of your head. Your arms should make an L on either side of your head. Your palms should be facing up. 5. If you don't feel a mild stretch in your shoulders and across your chest, use a foam roll or tightly rolled blanket under your spine, from your tailbone to your head. 6. Relax in this position for at least 15 to 30 seconds while you breathe normally. Repeat 2 to 4 times. 7. Each day you do this exercise, add a little more time until you can relax in this position for 2 or 3 minutes. When you can relax for at least 2 minutes, you only need to do the exercise 1 time per session. Follow-up care is a key part of your treatment and safety. Be sure to make and go to all appointments, and call your doctor if you are having problems. It's also a good idea to know your test results and keep a list of the medicines you take. Where can you learn more? Go to http://manuel-rody.info/. Enter [phone] in the search box to learn more about \"Shoulder Blade: Exercises. \"  Current as of: September 20, 2018  Content Version: 12.1  © 6565-2350 [employer]. Care instructions adapted under license by [employer] (which disclaims liability or warranty for this information). If you have questions about a medical condition or this instruction, always ask your healthcare professional. [place] [address] any warranty or liability for your use of this information.

## 2019-09-03 NOTE — LETTER
9/3/19 Patient: Rochelle Montenegro YOB: 1975 Date of Visit: 9/3/2019 Louie Youssef MD 
[address] [place] [id number] VIA In Basket Dear Louie Youssef MD, Thank you for referring Ms. Felicita De to [address] [place] [address] for evaluation. My notes for this consultation are attached. If you have questions, please do not hesitate to call me. I look forward to following your patient along with you. Sincerely, Jo Jenkins MD

## 2019-09-03 NOTE — PROGRESS NOTES
NEUROLOGY CLINIC NOTE    Patient ID:  Piotr Hare  [id number]  19 y.o.  1975    Date of Visit:  September 3, 2019    Referring Physician: Dr. Ronit Jacob    Reason for Visit:  Chronic headache    Chief Complaint   Patient presents with    Follow-up       History of Present Illness:     Patient Active Problem List    Diagnosis Date Noted    Peripheral vascular disease ([facility] 2019    Ventral hernia 2019    Anxiety     Intractable migraine with aura with status migrainosus 2016    Subacute maxillary sinusitis 2016     Past Medical History:   Diagnosis Date    Anxiety     Asthma     Depression     Factor V deficiency ([facility]     Heart rate fast     Long term current use of anticoagulant therapy     Migraine     Thromboembolus ([facility] 2006    DVT left groin      Past Surgical History:   Procedure Laterality Date    ABDOMEN SURGERY PROC UNLISTED      laproscopy removed adhesions on colon    DILATION AND CURETTAGE      HX APPENDECTOMY      HX  SECTION      HX HERNIA REPAIR  2019     Eze  incarcerated ventral hernia repair    HX HYSTERECTOMY  2018    HX ORTHOPAEDIC  2009    bone removed from foot    HX TUBAL LIGATION  c sec      Prior to Admission medications    Medication Sig Start Date End Date Taking? Authorizing Provider   sertraline (ZOLOFT) 100 mg tablet TAKE 1 TABLET BY MOUTH ONCE DAILY 19  Yes Abelardo Nunes MD   omeprazole (PRILOSEC) 20 mg capsule Take 20 mg by mouth daily. Yes Provider, Historical   zinc 50 mg tab tablet Take  by mouth daily. Yes Provider, Historical   acetaminophen (TYLENOL EXTRA STRENGTH) 500 mg tablet Take 1,000 mg by mouth. Yes Provider, Historical   baclofen (LIORESAL) 10 mg tablet Take 1 tab at bedtime x 1 wk; then 1 tab BID x 1 wk; then 1 tab TID 19  Yes Rosanne Jimenez MD   eletriptan (RELPAX) 40 mg tablet Take 1 Tab by mouth daily as needed (headache).  19  Yes Ayesha Joshi MD   ALPRAZolam Kayla ) 0.5 mg tablet Take 1 Tab by mouth two (2) times daily as needed for Anxiety. Max Daily Amount: 1 mg. 7/2/19  Yes Tonny Bateman MD   albuterol-ipratropium (DUO-NEB) 2.5 mg-0.5 mg/3 ml nebu USE 1 VIAL VIA NEBULIZER EVERY 6 HOURS AS NEEDED 12/19/18  Yes Tonny Bateman MD   albuterol (PROVENTIL HFA) 90 mcg/actuation inhaler Take 2 Puffs by inhalation every four (4) hours as needed for Wheezing. 12/4/17  Yes Tonyn Bateman MD   aspirin 81 mg chewable tablet Take 81 mg by mouth daily. Yes Other, MD Phani   diclofenac EC (VOLTAREN) 75 mg EC tablet Take 1 Tab by mouth two (2) times daily (with meals). 8/19/19   Tonny Bateman MD   cetirizine-pseudoePHEDrine (ZYRTEC-D) 5-120 mg Tb12 Take 1 Tab by mouth two (2) times a day. Provider, Historical   Magnesium Oxide 500 mg cap Take  by mouth. Provider, Historical   ferrous sulfate (IRON) 325 mg (65 mg iron) tablet Take  by mouth Daily (before breakfast). Provider, Historical   potassium 99 mg tablet Take 99 mg by mouth daily. Provider, Historical   cyanocobalamin (VITAMIN B12) 500 mcg tablet Take 500 mcg by mouth daily. Provider, Historical   butalbital-acetaminophen-caffeine (FIORICET, ESGIC) -40 mg per tablet TAKE 1 TABLET BY MOUTH EVERY 6 HOURS AS NEEDED FOR PAIN 7/8/19   Tonny Bateman MD   chlorpheniramine-HYDROcodone (TUSSIONEX) 10-8 mg/5 mL suspension Take 5 mL by mouth every twelve (12) hours as needed for Cough. Max Daily Amount: 10 mL. 12/4/17   Tonny Bateman MD     Allergies   Allergen Reactions    Adhesive Tape-Silicones Other (comments)     Pt states this burns her skin.       Social History     Tobacco Use    Smoking status: Former Smoker     Packs/day: 0.50     Years: 21.00     Pack years: 10.50     Types: Cigarettes     Last attempt to quit: 3/5/2016     Years since quitting: 3.4    Smokeless tobacco: Never Used   Substance Use Topics  Alcohol use: Yes     Alcohol/week: 0.0 standard drinks     Comment: Occ      Family History   Problem Relation Age of Onset    Diabetes Mother     Alzheimer Mother     Hypertension Mother     High Cholesterol Mother     Headache Mother     Heart Disease Mother     Cancer Father     Headache Father     Heart Disease Father     Diabetes Sister     No Known Problems Brother     Immunodeficiency Sister     No Known Problems Sister     Cancer Maternal Uncle     Cancer Maternal Grandfather         Subjective:      Amanda Barry is a 38 y.o. [facility] wit history of headaches, anxiety, depression, asthma, factor V deficiency and left groin DVT who was referred here by Abdoul Moreno for further evaluation of her headaches. Per patient condition started around 19 years PTC. Worse because it is more intense or frequent. S/P hysterectomy last year. Wake up with the headache or may wake her up. Right temporo-parietal radiates the neck, scapula or shoulder. Sharp and like a knife sticking. 1/10 to a at its worst a 10/10. Associated with blurring of vision, light and noise sensitivity, nausea and vomiting. Lasts 4 hours to as long as 4 to 5 days. Occurs 4 to 5 times a week. >20 headaches per month. Last severe headache was yesterday. Takes Fioricet and Zofran which dulls it down but not take it away  ER last week for the [facility]    She does not take any daily maintenance medication for the headaches. Previously saw a neurologist around 17 years ago and had occipital nerve block done without relief. Does desk and computer work. Review of records here revealed a head CT without contrast done 11/19/2012 for migraine headaches revealed no acute abnormality. Cervical spine x-ray done 3/1/2011 was normal.    CBC, CMP done by PCP 5/13/2019 was unremarkable. Patient was last seen by her PCP 7/1/2019 for abdominal pain and headache. Plan for migraine was to see us.   Patient prescribed trazodone 50 mg at bedtime for insomnia. Medications previously tried for headaches: Ibuprofen, Advil, Aleve, aspirin, Tylenol, Excedrin, Tylenol 3, Toradol, Fioricet, Imitrex, Darvocet, Vicodin, Percocet, oxycodone, Zofran, Lopressor, Depakote, amitriptyline, Flexeril, Flonase, Prilosec, Cymbalta, Skelaxin, Lexapro, Effexor, magnesium, Celebrex, prednisone, Xanax, Benadryl, lidocaine, melatonin, Paxil, Wellbutrin, Lyrica, antihistamine, Relpax    Since the last visit on 7/30/2019, patient was seen at the ER 8/2/2019 for headache exacerbation. CBC and BMP were unremarkable. She was treated with Decadron 10 mg IV and discharged. Per patient her headaches are still the same. She is taking Baclofen 10 mg TID. Denies any side effects. Neck is less stiff. She is aware of her posture and is correcting it. Neck and shoulder exercises may be helping. She did obtain a posture bra. She is set to start PT next week. Relpax did not offer any benefit as a abortive therapy. She tried ear piercing and unclear yet if it will help. Last severe headache was 2 days PTC. Still having >20 headaches per month. Patient was seen by her PCP last 8/19/2019 for twisting her right foot. Diagnosed with right foot sprain. X-ray was normal. Currently on a boot and using crutches. Outside reports reviewed: office notes, ER records, radiology reports, lab reports. Review of Systems:    A comprehensive review of systems was performed:   Positive for anxiety, depression, increased thirst, headaches, dizziness, balance issues    Objective:     Visit Vitals  /72   Pulse 98   Ht 5' 1\" (1.549 m)   Wt 143 lb (64.9 kg)   LMP 06/10/2017   SpO2 99%   BMI 27.02 kg/m²     6/10 right foot pain    PHYSICAL EXAM:    Extremity: Right foot in a boot. NEUROLOGICAL EXAM:    Appearance: The patient is well developed, well nourished, provides a coherent history and is in no acute distress.    Mental Status: Oriented to time, place and person. Fluent, no aphasia or dysarthria. Mood and affect appropriate. Cranial Nerves:   II - XII were intact. Motor:  5/5 strength. Normal bulk and tone. No fasciculations. No pronator drift. Reflexes:   Deep tendon reflexes were symmetrical.    Sensory:   Normal to cold, pinprick and vibration. Gait:  Using crutches given right foot is in a boot. Tremor:   No tremor noted. Cerebellar:  Intact FTN/LA/HTS. Severe anterior head posture (3-4 FB off shoulder) with shoulders rotated in; increase right SCM  (+) tenderness on palpation right occiput    Imaging  Right foot x-ray    Labs Reviewed      Assessment:   Chronic intractable migraine without aura - persistent  Right-sided occipital neuralgia - some benefit  Cervicogenic headaches - some benefit  Sprain right foot    Plan:   Neurological examination is unchanged. It is still nonfocal.  Previous head CT did not reveal any acute pathology. No indication currently to do any other neuroimaging. Worse headaches consistent with migraines. Likely triggered by neck and shoulder issues. Trial of Ayovy 225 mg subcutaneous injection every month. First dose was given in clinic. Prescription was ordered. Authorization to be obtained. Given the patient also has greater than 20 headaches per month, if no response to treatment is seen then trial of chemodenervation with Botox using the chronic migraine protocol will be done. Trial of Naratriptan 2.5 mg as needed for abortive therapy. Prescriptions provided. Advised to refrain from things that could trigger a headache (dairy, chocolate, wine and etc.). Advised to keep a headache log. Examined history also reveals elements of occipital neuralgia triggering her migraine headaches due to poor anterior head posture. Encouraged to continue to correct head posture. Continue baclofen 10 mg TID. Further titration will depend upon response and tolerability.   Ideally patient should have occipital nerve blocks but this is not covered by her insurance. Exam also reveals elements of cervicogenic headaches due to poor anterior head posture. Previous range of motion measurements revealed mild restriction head extension and right lateral head flexion. Discussed correcting her anterior head posture. Use headrest at work, at home and while driving. Use wireless headsets. Do not carry anything the shoulder. Use at most one pillow when sleeping. Continue neck and shoulder exercises. Patient was referred to physical therapy for more aggressive manipulation including dry needling. She will start next week. Right foot sprain. Normal x-ray. Continue conservative management. All questions and concerns were answered. This note was created using voice recognition software. Despite editing, there may be syntax errors.

## 2019-09-04 ENCOUNTER — TELEPHONE (OUTPATIENT)
Dept: NEUROLOGY | Age: 44
End: 2019-09-04

## 2019-09-04 NOTE — TELEPHONE ENCOUNTER
Margy approval from [facility] [id number]   9/4/19 - 9/3/2020. Faxed to [facility].

## 2019-09-04 NOTE — TELEPHONE ENCOUNTER
Re:  Margy SUAZO    Sent to [facility] via [facility] w/ office notes.        Status is pending.     (Key: [id number])    Approvedon September 4, 2019  PA Case: [id number], Status: Approved, Coverage Starts on: 9/4/2019 12:00:00 AM, Coverage Ends on: 9/3/2020 12:00:00 AM.

## 2019-09-13 ENCOUNTER — TELEPHONE (OUTPATIENT)
Dept: FAMILY MEDICINE CLINIC | Age: 44
End: 2019-09-13

## 2019-09-13 DIAGNOSIS — J32.0 MAXILLARY SINUSITIS, UNSPECIFIED CHRONICITY: Primary | ICD-10-CM

## 2019-09-13 RX ORDER — AMOXICILLIN 500 MG/1
500 CAPSULE ORAL 3 TIMES DAILY
Qty: 21 CAP | Refills: 0 | Status: SHIPPED | OUTPATIENT
Start: 2019-09-13 | End: 2019-09-20

## 2019-09-13 NOTE — TELEPHONE ENCOUNTER
Pt requesting something to be called into her pharmacy. She states she has sore throat, cough and low fever.

## 2019-09-19 ENCOUNTER — APPOINTMENT (OUTPATIENT)
Dept: VASCULAR SURGERY | Age: 44
End: 2019-09-19
Attending: EMERGENCY MEDICINE
Payer: COMMERCIAL

## 2019-09-19 ENCOUNTER — HOSPITAL ENCOUNTER (EMERGENCY)
Age: 44
Discharge: HOME | End: 2019-09-19
Attending: EMERGENCY MEDICINE | Admitting: EMERGENCY MEDICINE
Payer: COMMERCIAL

## 2019-09-19 ENCOUNTER — OFFICE VISIT (OUTPATIENT)
Dept: FAMILY MEDICINE CLINIC | Age: 44
End: 2019-09-19

## 2019-09-19 VITALS
WEIGHT: 143 LBS | RESPIRATION RATE: 20 BRPM | HEART RATE: 74 BPM | SYSTOLIC BLOOD PRESSURE: 128 MMHG | OXYGEN SATURATION: 100 % | HEIGHT: 61 IN | DIASTOLIC BLOOD PRESSURE: 78 MMHG | TEMPERATURE: 97.9 F | BODY MASS INDEX: 27 KG/M2

## 2019-09-19 VITALS
OXYGEN SATURATION: 97 % | HEART RATE: 88 BPM | SYSTOLIC BLOOD PRESSURE: 110 MMHG | DIASTOLIC BLOOD PRESSURE: 66 MMHG | TEMPERATURE: 97.8 F | RESPIRATION RATE: 18 BRPM

## 2019-09-19 DIAGNOSIS — M79.671 RIGHT FOOT PAIN: Primary | ICD-10-CM

## 2019-09-19 DIAGNOSIS — M79.661 PAIN AND SWELLING OF RIGHT LOWER LEG: Primary | ICD-10-CM

## 2019-09-19 DIAGNOSIS — M79.89 PAIN AND SWELLING OF RIGHT LOWER LEG: Primary | ICD-10-CM

## 2019-09-19 DIAGNOSIS — I99.8 ISCHEMIC LEG: ICD-10-CM

## 2019-09-19 LAB
LEFT ABI: 1.12
LEFT ANTERIOR TIBIAL: 133 MMHG
LEFT ARM BP: 123 MMHG
LEFT POSTERIOR TIBIAL: 138 MMHG
RIGHT ABI: 1.13
RIGHT ANTERIOR TIBIAL: 125 MMHG
RIGHT ARM BP: 121 MMHG
RIGHT POSTERIOR TIBIAL: 139 MMHG

## 2019-09-19 PROCEDURE — 99283 EMERGENCY DEPT VISIT LOW MDM: CPT

## 2019-09-19 PROCEDURE — 93922 UPR/L XTREMITY ART 2 LEVELS: CPT

## 2019-09-19 PROCEDURE — 93971 EXTREMITY STUDY: CPT

## 2019-09-19 NOTE — DISCHARGE INSTRUCTIONS
Patient Education        Leg Pain: Care Instructions  Your Care Instructions  Many things can cause leg pain. Too much exercise or overuse can cause a muscle cramp (or charley horse). You can get leg cramps from not eating a balanced diet that has enough potassium, calcium, and other minerals. If you do not drink enough fluids or are taking certain medicines, you may develop leg cramps. Other causes of leg pain include injuries, blood flow problems, nerve damage, and twisted and enlarged veins (varicose veins). You can usually ease pain with self-care. Your doctor may recommend that you rest your leg and keep it elevated. Follow-up care is a key part of your treatment and safety. Be sure to make and go to all appointments, and call your doctor if you are having problems. It's also a good idea to know your test results and keep a list of the medicines you take. How can you care for yourself at home? · Take pain medicines exactly as directed. ? If the doctor gave you a prescription medicine for pain, take it as prescribed. ? If you are not taking a prescription pain medicine, ask your doctor if you can take an over-the-counter medicine. · Take any other medicines exactly as prescribed. Call your doctor if you think you are having a problem with your medicine. · Rest your leg while you have pain, and avoid standing for long periods of time. · Prop up your leg at or above the level of your heart when possible. · Make sure you are eating a balanced diet that is rich in calcium, potassium, and magnesium, especially if you are pregnant. · If directed by your doctor, put ice or a cold pack on the area for 10 to 20 minutes at a time. Put a thin cloth between the ice and your skin. · Your leg may be in a splint, a brace, or an elastic bandage, and you may have crutches to help you walk. Follow your doctor's directions about how long to wear supports and how to use the crutches.   When should you call for help?  Call 911 anytime you think you may need emergency care. For example, call if:    · You have sudden chest pain and shortness of breath, or you cough up blood.     · Your leg is cool or pale or changes color.    Call your doctor now or seek immediate medical care if:    · You have increasing or severe pain.     · Your leg suddenly feels weak and you cannot move it.     · You have signs of a blood clot, such as:  ? Pain in your calf, back of the knee, thigh, or groin. ? Redness and swelling in your leg or groin.     · You have signs of infection, such as:  ? Increased pain, swelling, warmth, or redness. ? Red streaks leading from the sore area. ? Pus draining from a place on your leg. ? A fever.     · You cannot bear weight on your leg.    Watch closely for changes in your health, and be sure to contact your doctor if:    · You do not get better as expected. Where can you learn more? Go to http://manuel-rody.info/. Enter W426 in the search box to learn more about \"Leg Pain: Care Instructions. \"  Current as of: September 23, 2018  Content Version: 12.1  © 5120-5169 [employer]wise, [employer]. Care instructions adapted under license by [employer] (which disclaims liability or warranty for this information). If you have questions about a medical condition or this instruction, always ask your healthcare professional. [place] [address] any warranty or liability for your use of this information.

## 2019-09-19 NOTE — PROGRESS NOTES
Chief Complaint   Patient presents with    Foot Pain     F/U  for R foot pain and swelling. 1. Have you been to the ER, urgent care clinic since your last visit? Hospitalized since your last visit? No    2. Have you seen or consulted any other health care providers outside of the [address] [place] [address] since your last visit? Include any pap smears or colon screening.  No [facility]  [address]  [place] [address]  Phone: [phone]      8/27/2018     Bradley Liz  [address]   [facility] [id number]      Dear Bradley:    Thank you for allowing me to participate in your care. Your recent test results were reviewed and listed below.  tests are acceptable    Your results are provided below for your review  Results for orders placed or performed in visit on 08/24/18   Basic metabolic panel   Result Value Ref Range    Sodium 138 133 - 144 mmol/L    Potassium 4.9 3.4 - 5.3 mmol/L    Chloride 102 94 - 109 mmol/L    Carbon Dioxide 29 20 - 32 mmol/L    Anion Gap 7 3 - 14 mmol/L    Glucose 97 70 - 99 mg/dL    Urea Nitrogen 14 7 - 30 mg/dL    Creatinine 0.92 0.66 - 1.25 mg/dL    GFR Estimate 79 >60 mL/min/1.7m2    GFR Estimate If Black >90 >60 mL/min/1.7m2    Calcium 8.4 (L) 8.5 - 10.1 mg/dL   Lipid panel reflex to direct LDL Fasting   Result Value Ref Range    Cholesterol 138 <200 mg/dL    Triglycerides 152 (H) <150 mg/dL    HDL Cholesterol 42 >39 mg/dL    LDL Cholesterol Calculated 66 <100 mg/dL    Non HDL Cholesterol 96 <130 mg/dL                 Thank you for choosing [facility]. As a result, please continue with the treatment plan discussed in the office. Return as discussed or sooner if symptoms worsen or fail to improve.     If you have any further questions or concerns, please do not hesitate to contact us.      Sincerely,        Dr. Alex Mustafa

## 2019-09-19 NOTE — PROGRESS NOTES
HISTORY OF PRESENT ILLNESS  Margi Orr is a 38 y.o. female. continued distal rt forefft pain x 1 mo since twisting it in a fall,xrays at the time were ok. Now has swelling in foot and pain and coolness of entire rt lower extremity  Foot Pain   The history is provided by the patient. This is a chronic problem. The current episode started more than 1 week ago. The problem occurs hourly. The problem has not changed since onset. Pertinent negatives include no abdominal pain. Leg Pain    The history is provided by the patient. This is a chronic problem. The current episode started more than 1 week ago. The problem occurs daily. The pain is present in the right upper leg, right lower leg and right foot. The pain is at a severity of 5/10. The pain is moderate. Pertinent negatives include no back pain. The symptoms are aggravated by activity. Review of Systems   Constitutional: Negative for fever and malaise/fatigue. Gastrointestinal: Negative for abdominal pain. Musculoskeletal: Positive for myalgias. Negative for back pain and joint pain. Physical Exam   Constitutional: She appears well-developed and well-nourished. HENT:   Head: Normocephalic and atraumatic. Right Ear: External ear normal.   Left Ear: External ear normal.   Nose: Nose normal.   Mouth/Throat: Oropharynx is clear and moist.   Cardiovascular: Normal rate and regular rhythm. Abdominal: Soft. Bowel sounds are normal.   Musculoskeletal:        Right foot: There is decreased range of motion, tenderness, swelling and decreased capillary refill. Entire leg cool ,dusky,mild edema of foot and ankl,absent pedal pulses       ASSESSMENT and PLAN  Diagnoses and all orders for this visit:    1. Right foot pain    2.  Ischemic leg subacute ; cool pulseless limb    Reffered to [facility] for urgent evaluation/vascular w/u

## 2019-09-19 NOTE — ED TRIAGE NOTES
Patient arrived on crutches with cast shoe on right foot. She says she has pain to calf and foot.  Foot is reported to be cold to touch and she was sent by PCP for doppler

## 2019-09-20 ENCOUNTER — TELEPHONE (OUTPATIENT)
Dept: FAMILY MEDICINE CLINIC | Age: 44
End: 2019-09-20

## 2019-09-20 DIAGNOSIS — I99.8 ISCHEMIC LEG: Primary | ICD-10-CM

## 2019-09-20 RX ORDER — TRAMADOL HYDROCHLORIDE 50 MG/1
50 TABLET ORAL
Qty: 30 TAB | Refills: 0 | Status: SHIPPED | OUTPATIENT
Start: 2019-09-20 | End: 2019-09-27

## 2019-09-20 NOTE — TELEPHONE ENCOUNTER
Patient states that she will see her Vascular surgeon on 9/25/19 and wants to know what she should do about the pain until then.  Please call to advise [phone]

## 2019-09-20 NOTE — TELEPHONE ENCOUNTER
Jude Phillips  Female, 38 y.o., 1975  Weight:   143 lb (64.9 kg)  MRN:   [id number]  Phone:   [phone] (M)  PCP:   Elda Moise MD  Primary Cvg:   [facility]  Last Appt With Me  None  Next Appt With Me  None  Next Appt  11/4/19 - Jeffrey Darling MD - [facility] [phone]  Non-Urgent Medical Question     From  Shikha Lieberman To  [facility] Nurse Pool Sent  9/19/2019  4:38 PM   I went to ER today and they said to come back to you tomorrow. No blood clot and blood flow is ok.     Encounter Messages     Read Composed From To Subject   Y 9/19/2019  4:38 PM Breanna Yost MD Non-Urgent Medical Question

## 2019-09-20 NOTE — TELEPHONE ENCOUNTER
Jude Phillips  Female, 38 y.o., 1975  Weight:   143 lb (64.9 kg)  MRN:   [id number]  Phone:   [phone] Elease Aschoff)  PCP:   Vielka Agudelo MD  Primary Cvg:   [facility]  Last Appt With Me  None  Next Appt With Me  None  Next Appt  11/4/19 - Maria A Butcher MD - [facility] NEUROLOG[facility] [address]  Prescription Question     From  Wynona Duane To  [facility] Nurse Pool Sent  9/20/2019 12:11 PM   I have the appointment for Vascular Surgeon on Wednesday 25th. Until then what do I do about pain?     Encounter Messages     Read Composed From To Subject   Y 9/20/2019 12:11 PM Shobha Contreras MD Prescription Question

## 2019-09-26 ENCOUNTER — TELEPHONE (OUTPATIENT)
Dept: FAMILY MEDICINE CLINIC | Age: 44
End: 2019-09-26

## 2019-09-26 DIAGNOSIS — M79.671 RIGHT FOOT PAIN: Primary | ICD-10-CM

## 2019-09-26 NOTE — TELEPHONE ENCOUNTER
Jude Phillips  Female, 38 y.o., 1975  Weight:   143 lb (64.9 kg)  MRN:   [id number]  Phone:   [phone] All Lopez)  PCP:   Nakul Domingo MD  Primary Cvg:   [facility]  Last Appt With Me  None  Next Appt With Me  None  Next Appt  11/4/19 - Kel Elliott MD - [address]  Visit Follow-Up Question     From  Para Derby To  [facility] Nurse Pool Sent  9/25/2019  7:58 PM   I went to Vascular surgeon today. He said it is not circulatory. What about going to see an orthopedic doctor, get an MRI?     Encounter Messages     Read Composed From To Subject   Y 9/25/2019  7:58 PM Cristiana Estrada MD Visit Follow-Up Question

## 2019-09-27 ENCOUNTER — TELEPHONE (OUTPATIENT)
Dept: FAMILY MEDICINE CLINIC | Age: 44
End: 2019-09-27

## 2019-09-27 NOTE — TELEPHONE ENCOUNTER
Jude Phillips  Female, 38 y.o., 1975  Weight:   143 lb (64.9 kg)  MRN:   [id number]  Phone:   [phone] (M)  PCP:   Karri Paez MD  Primary Cvg:   [facility]  Last Appt With Me  None  Next Appt With Me  None  Next Appt  11/4/19 - Melodie Valdez MD - [facility] [address]  Referral Request     From  Janet Harris To  [facility] Nurse Pool Sent  9/27/2019  1:19 PM   Wanted to see if you were able to get an appointment for me for orthopedic or neurologist. Rea Hoose handles nerves for my foot. It has been 6 weeks as of tomorrow and my leg is still cold, swollen, hurting, and red. Thank you for all your help.  I just want to find out what is going on and get it fixed as soon as possible.      Encounter Messages     Read Composed From To Subject   Y 9/27/2019  1:19 PM Jose Carlos Alegre MD Referral Request

## 2019-09-29 DIAGNOSIS — S93.601A FOOT SPRAIN, RIGHT, INITIAL ENCOUNTER: Primary | ICD-10-CM

## 2019-09-30 RX ORDER — TRAMADOL HYDROCHLORIDE 50 MG/1
TABLET ORAL
Qty: 30 TAB | Refills: 0 | Status: SHIPPED | OUTPATIENT
Start: 2019-09-30 | End: 2019-10-30

## 2019-10-01 ENCOUNTER — OFFICE VISIT (OUTPATIENT)
Dept: ORTHOPEDIC SURGERY | Age: 44
End: 2019-10-01

## 2019-10-01 ENCOUNTER — HOSPITAL ENCOUNTER (OUTPATIENT)
Dept: GENERAL RADIOLOGY | Age: 44
Discharge: HOME | End: 2019-10-01
Payer: COMMERCIAL

## 2019-10-01 VITALS — RESPIRATION RATE: 16 BRPM | BODY MASS INDEX: 26.06 KG/M2 | TEMPERATURE: 98 F | HEIGHT: 61 IN | WEIGHT: 138 LBS

## 2019-10-01 DIAGNOSIS — M25.571 PAIN IN JOINT OF RIGHT FOOT: ICD-10-CM

## 2019-10-01 DIAGNOSIS — M25.571 PAIN IN JOINT OF RIGHT FOOT: Primary | ICD-10-CM

## 2019-10-01 PROCEDURE — 73630 X-RAY EXAM OF FOOT: CPT

## 2019-10-01 RX ORDER — PROMETHAZINE HYDROCHLORIDE AND DEXTROMETHORPHAN HYDROBROMIDE 6.25; 15 MG/5ML; MG/5ML
SYRUP ORAL
Refills: 1 | COMMUNITY
Start: 2019-09-24 | End: 2019-12-17 | Stop reason: SDUPTHER

## 2019-10-01 RX ORDER — PREGABALIN 75 MG/1
75 CAPSULE ORAL 2 TIMES DAILY
Qty: 40 CAP | Refills: 0 | Status: SHIPPED | OUTPATIENT
Start: 2019-10-01 | End: 2019-11-04

## 2019-10-01 NOTE — PROGRESS NOTES
Chief Complaint   Patient presents with    Foot Pain     Seen at the request of harry Rowley right foot pain. 1. Have you been to the ER, urgent care clinic since your last visit? Hospitalized since your last visit? ER, foot pain, doppler. 2. Have you seen or consulted any other health care providers outside of the [address] [place] [address] since your last visit? Include any pap smears or colon screening.  No

## 2019-10-01 NOTE — PROGRESS NOTES
10/1/2019      CC: Right foot pain    HPI:      This is a 38y.o. year old patient who had a fall with a twisting injury to her right forefoot 6 weeks ago, she has had pain and swelling since then. She has difficulty ambulating, she has been having pain now for 6 weeks, she ambulates with crutches. She has taken Tylenol and ibuprofen for pain. She is unable to ambulate without crutches. She has a history of sesamoidectomy approximately 10 years ago, she did well with this and without any complication. She did not have any foot pain prior to this twisting injury. She has not had any other treatment, however a DVT was ruled out. She states that her forefoot hurts quite a bit, her midfoot is not as painful. She has no other major complaints today. PMH:  Past Medical History:   Diagnosis Date    Anxiety     Asthma     Depression     [facility] deficiency ([facility] [address]     Heart rate fast     Long term current use of anticoagulant therapy     Migraine     Thromboembolus ([facility] [address] 2006    DVT left groin       PSxHx:  Past Surgical History:   Procedure Laterality Date    ABDOMEN SURGERY PROC UNLISTED      laproscopy removed adhesions on colon    DILATION AND CURETTAGE      HX APPENDECTOMY      HX  SECTION      HX HERNIA REPAIR  2019     Isaura Farr incarcerated ventral hernia repair    HX HYSTERECTOMY  2018    HX ORTHOPAEDIC  2009    bone removed from foot    HX TUBAL LIGATION  c sec       Meds:    Current Outpatient Medications:     pregabalin (LYRICA) 75 mg capsule, Take 1 Cap by mouth two (2) times a day. Max Daily Amount: 150 mg., Disp: 40 Cap, Rfl: 0    ascorbic acid, vitamin C, (VITAMIN C) 100 mg tab, Take 1 Tab by mouth two (2) times a day., Disp: 60 Tab, Rfl: 0    traMADol (ULTRAM) 50 mg tablet, TAKE 1 TABLET BY MOUTH EVERY 6 HOURS AS NEEDED FOR PAIN.  MAX 4 TABS/DAY, Disp: 30 Tab, Rfl: 0    fremanezumab-vfrm (AJOVY) 225 mg/1.5 mL syrg, 225 mg by SubCUTAneous route every month., Disp: 1 Kit, Rfl: 11    naratriptan (AMERGE) 2.5 mg tab, Take 1 at onset of headache; may repeat another dose after 4 hours if headaches persist, Disp: 9 Tab, Rfl: 1    sertraline (ZOLOFT) 100 mg tablet, TAKE 1 TABLET BY MOUTH ONCE DAILY, Disp: 30 Tab, Rfl: 3    omeprazole (PRILOSEC) 20 mg capsule, Take 20 mg by mouth daily. , Disp: , Rfl:     acetaminophen (TYLENOL EXTRA STRENGTH) 500 mg tablet, Take 1,000 mg by mouth., Disp: , Rfl:     baclofen (LIORESAL) 10 mg tablet, Take 1 tab at bedtime x 1 wk; then 1 tab BID x 1 wk; then 1 tab TID, Disp: 90 Tab, Rfl: 1    ALPRAZolam (XANAX) 0.5 mg tablet, Take 1 Tab by mouth two (2) times daily as needed for Anxiety. Max Daily Amount: 1 mg., Disp: 60 Tab, Rfl: 3    albuterol-ipratropium (DUO-NEB) 2.5 mg-0.5 mg/3 ml nebu, USE 1 VIAL VIA NEBULIZER EVERY 6 HOURS AS NEEDED, Disp: 180 mL, Rfl: 0    albuterol (PROVENTIL HFA) 90 mcg/actuation inhaler, Take 2 Puffs by inhalation every four (4) hours as needed for Wheezing., Disp: 1 Inhaler, Rfl: 3    aspirin 81 mg chewable tablet, Take 81 mg by mouth daily. , Disp: , Rfl:     promethazine-dextromethorphan (PROMETHAZINE-DM) 6.25-15 mg/5 mL syrup, TAKE 5 ML BY MOUTH EVERY FOUR (4) HOURS AS NEEDED FOR COUGH FOR UP TO 7 DAYS., Disp: , Rfl: 1    diclofenac EC (VOLTAREN) 75 mg EC tablet, Take 1 Tab by mouth two (2) times daily (with meals). , Disp: 30 Tab, Rfl: 1    cetirizine-pseudoePHEDrine (ZYRTEC-D) 5-120 mg Tb12, Take 1 Tab by mouth two (2) times a day., Disp: , Rfl:     Magnesium Oxide 500 mg cap, Take  by mouth., Disp: , Rfl:     ferrous sulfate (IRON) 325 mg (65 mg iron) tablet, Take  by mouth Daily (before breakfast). , Disp: , Rfl:     potassium 99 mg tablet, Take 99 mg by mouth daily. , Disp: , Rfl:     zinc 50 mg tab tablet, Take  by mouth daily. , Disp: , Rfl:     cyanocobalamin (VITAMIN B12) 500 mcg tablet, Take 500 mcg by mouth daily. , Disp: , Rfl:     butalbital-acetaminophen-caffeine (FIORICET, ESGIC) -40 mg per tablet, TAKE 1 TABLET BY MOUTH EVERY 6 HOURS AS NEEDED FOR PAIN, Disp: 60 Tab, Rfl: 0    chlorpheniramine-HYDROcodone (TUSSIONEX) 10-8 mg/5 mL suspension, Take 5 mL by mouth every twelve (12) hours as needed for Cough. Max Daily Amount: 10 mL. (Patient not taking: Reported on 9/19/2019), Disp: 115 mL, Rfl: 0    All: Allergies   Allergen Reactions    Adhesive Tape-Silicones Other (comments)     Pt states this burns her skin. Social Hx:  Social History     Socioeconomic History    Marital status:      Spouse name: Not on file    Number of children: Not on file    Years of education: Not on file    Highest education level: Not on file   Tobacco Use    Smoking status: Former Smoker     Packs/day: 0.50     Years: 21.00     Pack years: 10.50     Types: Cigarettes     Last attempt to quit: 3/5/2016     Years since quitting: 3.5    Smokeless tobacco: Never Used   Substance and Sexual Activity    Alcohol use: Yes     Alcohol/week: 0.0 standard drinks     Comment:  Occ    Drug use: No    Sexual activity: Yes     Partners: Male     Birth control/protection: IUD   Social History Narrative    ** Merged History Encounter **            Family Hx:  Family History   Problem Relation Age of Onset    Diabetes Mother     Alzheimer Mother     Hypertension Mother     High Cholesterol Mother     Headache Mother     Heart Disease Mother     Cancer Father     Headache Father     Heart Disease Father     Diabetes Sister     No Known Problems Brother     Immunodeficiency Sister     No Known Problems Sister     Cancer Maternal Uncle     Cancer Maternal Grandfather          Review of Systems:       General: Denies headache, lethargy, fever, weight loss  Ears/Nose/Throat: Denies ear discharge, drainage, nosebleeds, hoarse voice, dental problems  Cardiovascular: Denies chest pain, shortness of breath  Lungs: Denies chest pain, breathing problems, wheezing, pneumonia  Stomach: Denies stomach pain, heartburn, constipation, irritable bowel  Skin: Denies rash, sores, open wounds  Musculoskeletal: Right foot pain, inability to ambulate without crutches  Genitourinary: Denies dysuria, hematuria, polyuria  Gastrointestinal: Denies constipation, obstipation, diarrhea  Neurological: Denies changes in sight, smell, hearing, taste, seizures. Denies loss of consciousness. Psychiatric: Denies depression, sleep pattern changes, anxiety, change in personality  Endocrine: Denies mood swings, heat or cold intolerance  Hematologic/Lymphatic: Denies anemia, purpura, petechia  Allergic/Immunologic: Denies swelling of throat, pain or swelling at lymph nodes      Physical Examination:    Visit Vitals  BP (P) 118/72 (BP 1 Location: Right arm, BP Patient Position: Sitting)   Temp 98 °F (36.7 °C) (Oral)   Resp 16   Ht 5' 1\" (1.549 m)   Wt 138 lb (62.6 kg)   BMI 26.07 kg/m²        General: AOX3, no apparent distress  Psychiatric: mood and affect appropriate  Lungs: breathing is symmetric and unlabored bilaterally  Heart: regular rate and rhythm  Abdomen: no guarding  Head: normocephalic, atraumatic  Skin: No significant abnormalities, good turgor  Sensation intact to light touch: C5-T1 dermatomes  Muscular exam: 5/5 strength in all major muscle groups unless noted in specialty exam.    Extremities      Right upper extremity: Full active and passive range of motion without pain, deformity, no open wound, strength 5/5 in all major muscle groups. Left upper extremity:  Full active and passive range of motion without pain, deformity, no open wound, strength 5/5 in all major muscle groups. Right lower extremity: Right lower extremity indicates diffuse swelling of the forefoot, she has significant tenderness to palpation at the forefoot, midfoot is mildly tender. Active range of motion is very limited, passive range of motion is limited secondary to patient's pain. Sensation is intact light touch in the L1-S1 dermatomes. Capillary refill is less than 2 seconds in the toes. Strength testing could not be performed at the forefoot because of her discomfort, however she does have full ankle strength at 5 out of 5. Left lower extremity:  Full active and passive range of motion without pain, deformity, no open wound, strength 5/5 in all major muscle groups. Diagnostics:    Pertinent Xrays:  Xrays are available of the right foot from 6 weeks ago, there osseous abnormalities, there is an absence of the fibular sesamoid, no other abnormalities. Assessment: Right foot pain, possible chronic regional pain syndrome, possible sprain, possible occult fracture of the forefoot  Plan: This patient does have the above-mentioned issue, she has significant pain and inability to ambulate, this is been an impactful issue for her, I have given her a cam boot for comfort with instructions, additionally I have given her prescriptions for Lyrica as well as vitamin C to help with any potential for chronic regional pain syndrome. I have ordered a repeat x-ray to rule out subacute fracture and possible visualization of any callus, I have additionally ordered physical therapy for gentle and progressive range of motion to help with the swelling and pain. Additionally, I have ordered an MRI to rule out occult fracture of the forefoot. She stated her understanding and satisfaction and she will follow-up once her MRI is completed. Ms. Houston Mely has a reminder for a \"due or due soon\" health maintenance. I have asked that she contact her primary care provider for follow-up on this health maintenance.

## 2019-10-10 ENCOUNTER — HOSPITAL ENCOUNTER (OUTPATIENT)
Dept: MRI IMAGING | Age: 44
Discharge: HOME | End: 2019-10-10
Attending: ORTHOPAEDIC SURGERY
Payer: COMMERCIAL

## 2019-10-10 DIAGNOSIS — M25.571 PAIN IN JOINT OF RIGHT FOOT: ICD-10-CM

## 2019-10-10 PROCEDURE — 73718 MRI LOWER EXTREMITY W/O DYE: CPT

## 2019-10-17 ENCOUNTER — TELEPHONE (OUTPATIENT)
Dept: FAMILY MEDICINE CLINIC | Age: 44
End: 2019-10-17

## 2019-10-17 ENCOUNTER — OFFICE VISIT (OUTPATIENT)
Dept: FAMILY MEDICINE CLINIC | Age: 44
End: 2019-10-17

## 2019-10-17 VITALS
DIASTOLIC BLOOD PRESSURE: 58 MMHG | SYSTOLIC BLOOD PRESSURE: 92 MMHG | HEART RATE: 85 BPM | RESPIRATION RATE: 20 BRPM | TEMPERATURE: 97.5 F | OXYGEN SATURATION: 99 %

## 2019-10-17 DIAGNOSIS — G43.109 MIGRAINE WITH VERTIGO: ICD-10-CM

## 2019-10-17 DIAGNOSIS — G43.719 CHRONIC MIGRAINE WITHOUT AURA, INTRACTABLE, WITHOUT STATUS MIGRAINOSUS: ICD-10-CM

## 2019-10-17 DIAGNOSIS — G43.909 MIGRAINE SYNDROME: Primary | ICD-10-CM

## 2019-10-17 RX ORDER — NARATRIPTAN 2.5 MG/1
TABLET ORAL
Qty: 9 TAB | Refills: 1 | Status: SHIPPED | OUTPATIENT
Start: 2019-10-17 | End: 2019-11-04

## 2019-10-17 RX ORDER — MECLIZINE HYDROCHLORIDE 25 MG/1
25 TABLET ORAL
Qty: 30 TAB | Refills: 1 | Status: SHIPPED | OUTPATIENT
Start: 2019-10-17 | End: 2019-10-23

## 2019-10-17 RX ORDER — KETOROLAC TROMETHAMINE 30 MG/ML
30 INJECTION, SOLUTION INTRAMUSCULAR; INTRAVENOUS ONCE
Qty: 1 VIAL | Refills: 0
Start: 2019-10-17 | End: 2019-10-17

## 2019-10-17 RX ORDER — PROMETHAZINE HYDROCHLORIDE 25 MG/ML
25 INJECTION, SOLUTION INTRAMUSCULAR; INTRAVENOUS ONCE
Qty: 1 VIAL | Refills: 0
Start: 2019-10-17 | End: 2019-10-17

## 2019-10-17 NOTE — PROGRESS NOTES
Toradol 30 mg given IM in right arm , no reaction noted. Promethazine 25 mg IM given in left arm , no reaction noted.

## 2019-10-17 NOTE — LETTER
NOTIFICATION OF RETURN TO WORK / SCHOOL 
 
10/17/2019 1:12 PM 
 
Ms. Hudson Bolden [address] [place] [address] To Whom It May Concern: 
 
Hudson Bolden was under the care of [facility] from 10/14/19. She will be able to return to work/school on 10/21/19 with no restrictions. If there are questions or concerns please have the patient contact our office. Sincerely, Panchito Salinas MD

## 2019-10-17 NOTE — PROGRESS NOTES
HISTORY OF PRESENT ILLNESS  Mahesh Marcano is a 38 y.o. female. 3 day hx of rt sided throbbing headache with positional vertigo,at timessevere. Taking amerge prn migraine and Amovie  Dizziness    The history is provided by the patient. This is a new problem. The current episode started more than 2 days ago. The problem occurs hourly. The problem has been gradually worsening. Associated symptoms include malaise/fatigue, nausea, headaches and dizziness. Pertinent negatives include no fever and no vomiting. Headache   The history is provided by the patient. This is a recurrent problem. The current episode started more than 2 days ago. The problem occurs hourly. The problem has not changed since onset. Associated symptoms include headaches. Review of Systems   Constitutional: Positive for malaise/fatigue. Negative for chills and fever. Gastrointestinal: Positive for nausea. Negative for vomiting. Neurological: Positive for dizziness and headaches. Physical Exam   Constitutional: She is oriented to person, place, and time. She appears well-developed and well-nourished. She appears distressed. HENT:   Head: Normocephalic and atraumatic. Right Ear: External ear normal.   Left Ear: External ear normal.   Nose: Nose normal.   Mouth/Throat: Oropharynx is clear and moist.   Cardiovascular: Normal rate and regular rhythm. Pulmonary/Chest: Effort normal and breath sounds normal.   Abdominal: Soft. Bowel sounds are normal.   Neurological: She is alert and oriented to person, place, and time. She has normal reflexes. No cranial nerve deficit. Coordination normal.   Skin: Skin is warm and dry. ASSESSMENT and PLAN  Diagnoses and all orders for this visit:    1. Migraine syndrome  -     meclizine (ANTIVERT) 25 mg tablet; Take 1 Tab by mouth three (3) times daily as needed for Dizziness for up to 10 days. -     naratriptan (AMERGE) 2.5 mg tab;  Take 1 at onset of headache; may repeat another dose after 4 hours if headaches persist  -     KETOROLAC TROMETHAMINE INJ  -     WI THER/PROPH/DIAG INJECTION, SUBCUT/IM  -     PROMETHAZINE HCL INJECTION    2. Migraine with vertigo    3. Chronic migraine without aura, intractable, without status migrainosus  -     naratriptan (AMERGE) 2.5 mg tab; Take 1 at onset of headache; may repeat another dose after 4 hours if headaches persist    Other orders  -     ketorolac (TORADOL) 30 mg/mL (1 mL) injection; 1 mL by IntraVENous route once for 1 dose. -     promethazine (PHENERGAN) 25 mg/mL injection; 1 mL by IntraMUSCular route once for 1 dose.

## 2019-10-17 NOTE — TELEPHONE ENCOUNTER
Jude Phillips  Female, 38 y.o., 1975  Weight:   138 lb (62.6 kg)  MRN:   [id number]  Phone:   [phone] (M)  PCP:   Dwayne Leslie MD  Primary Cvg:   [facility]  Last Appt With Me  None  Next Appt With Me  None  Next Appt  11/4/19 - Alfredo Joseph MD - [facility] [phone]  Non-Urgent Medical Question     From  [facility] To  [facility] Nurse Pool Sent  10/17/2019  7:54 AM   Every time I move my head it feels like the world is spinning. I can't even lift my head up off my pillow. Is there anything I can, take to help this? I have to work but I can't like this. This is the third day of dealing with this. I figured it would stop by now but it hasn't.     Encounter Messages     Read Composed From To Subject   Y 10/17/2019  7:54 AM Stacey Ojeda MD Non-Urgent Medical Question

## 2019-10-17 NOTE — PROGRESS NOTES
Chief Complaint   Patient presents with    Pain (Chronic)     Pt state she is having pain all over.  Dizziness     Pt state she is having dizziness.  Headache     Pt stae she is having migraine. 1. Have you been to the ER, urgent care clinic since your last visit? Hospitalized since your last visit? No    2. Have you seen or consulted any other health care providers outside of the [address] [place] [address] since your last visit? Include any pap smears or colon screening.  No

## 2019-10-21 ENCOUNTER — TELEPHONE (OUTPATIENT)
Dept: FAMILY MEDICINE CLINIC | Age: 44
End: 2019-10-21

## 2019-10-21 NOTE — TELEPHONE ENCOUNTER
Jude Phillips  Female, 38 y.o., 1975  Weight:   138 lb (62.6 kg)  MRN:   [id number]  Phone:   [phone] (M)  PCP:   Bailey Altman MD  Primary Cvg:   [facility]  Last Appt With Me  None  Next Appt With Me  None  Next Appt  11/4/19 - Carla Kirby MD - [facility] [phone]  Non-Urgent Medical Question     From  Terry Tolbert To  [facility] Nurse Pool Sent  10/20/2019  2:40 PM   Is there any way Dr. Robert Mehta can give me a note for a few more days being out of work? I am still really dizzy when I turn my head so I can not drive yet. 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 I have been taking the medicine and it has helped with it but I still get dizzy. I feel just a few more days of just laying down, drinking water, and taking meds should do it. If not then I will come back in or go to ER. Please and thank you.     Encounter Messages     Read Composed From To Subject   Y 10/20/2019  2:40 PM Paco Ortiz MD Non-Urgent Medical Question

## 2019-10-22 ENCOUNTER — TELEPHONE (OUTPATIENT)
Dept: FAMILY MEDICINE CLINIC | Age: 44
End: 2019-10-22

## 2019-10-22 NOTE — TELEPHONE ENCOUNTER
Jude Phillips  Female, 38 y.o., 1975  Weight:   138 lb (62.6 kg)  MRN:   [id number]  Phone:   [phone] (M)  PCP:   Gabbie Garcia MD  Primary Cvg:   [facility]  Last Appt With Me  None  Next Appt With Me  None  Next Appt  11/4/19 - Sandeep Sepulveda MD - [place] [address] [phone]  Non-Urgent Medical Question     From  [facility] To  [facility] Nurse Pool Sent  10/22/2019 11:41 AM   I woke up with another migraine and dizziness. Should I come to see you or just go to ER?     Encounter Messages     Read Composed From To Subject   Y 10/22/2019 11:41 AM Mehul Arora MD Non-Urgent Medical Question

## 2019-10-23 ENCOUNTER — TELEPHONE (OUTPATIENT)
Dept: FAMILY MEDICINE CLINIC | Age: 44
End: 2019-10-23

## 2019-10-23 DIAGNOSIS — G43.909 MIGRAINE SYNDROME: ICD-10-CM

## 2019-10-23 DIAGNOSIS — G43.109 MIGRAINE WITH VERTIGO: Primary | ICD-10-CM

## 2019-10-23 RX ORDER — MECLIZINE HYDROCHLORIDE 25 MG/1
50 TABLET ORAL
Qty: 50 TAB | Refills: 1 | Status: SHIPPED | OUTPATIENT
Start: 2019-10-23 | End: 2019-11-15 | Stop reason: SDUPTHER

## 2019-10-23 NOTE — TELEPHONE ENCOUNTER
Jude Phillips  Female, 38 y.o., 1975  Weight:   138 lb (62.6 kg)  MRN:   [id number]  Phone:   [phone] (M)  PCP:   Rosana Solo MD  Primary Cvg:   [facility]  Last Appt With Me  None  Next Appt With Me  None  Next Appt  11/4/19 - Victor M Celestin MD - [address] [facility] [address]  Visit Follow-Up Question     From  Lakshmi Alfaro To  [facility] Nurse Pool Sent  10/23/2019  7:33 AM   I went to urgent care on [address] last night. They did a CT scan, gave me fluids, and migraine cocktail. Helped with migraine but not vertigo. Can you please call me. Thank you.     Encounter Messages     Read Composed From To Subject   Y 10/23/2019  7:33 AM Patrica Gibbs MD Visit Follow-Up Question

## 2019-10-24 ENCOUNTER — TELEPHONE (OUTPATIENT)
Dept: FAMILY MEDICINE CLINIC | Age: 44
End: 2019-10-24

## 2019-10-25 ENCOUNTER — TELEPHONE (OUTPATIENT)
Dept: FAMILY MEDICINE CLINIC | Age: 44
End: 2019-10-25

## 2019-10-25 DIAGNOSIS — G43.909 MIGRAINE SYNDROME: Primary | ICD-10-CM

## 2019-10-25 RX ORDER — ONDANSETRON 4 MG/1
4 TABLET, ORALLY DISINTEGRATING ORAL
Qty: 30 TAB | Refills: 2 | OUTPATIENT
Start: 2019-10-25 | End: 2021-05-03

## 2019-10-25 NOTE — TELEPHONE ENCOUNTER
Jude Phillips  Female, 38 y.o., 1975  Weight:   138 lb (62.6 kg)  MRN:   [id number]  Phone:   [phone] (M)  PCP:   Nakul Domingo MD  Primary Cvg:   [facility]  Last Appt With Me  None  Next Appt With Me  None  Next Appt  11/4/19 - Kel Elliott MD - [place] [address] [address]  Non-Urgent Medical Question     From  [facility] To  [facility] Nurse Pool Sent  10/25/2019  6:59 AM   I woke up with migraine pounding again. It has been 10 days since all this started. I have taken my naratriptan and vertigo. I do not have any zofran left. I was hoping the hospital trip would break this cycle but apparently it didn't.  Can I also get the note out of work until I can see the neurologist?      Encounter Messages     Read Composed From To Subject   Y 10/25/2019  6:59 AM Cristiana Estrada MD Non-Urgent Medical Question

## 2019-11-04 ENCOUNTER — OFFICE VISIT (OUTPATIENT)
Dept: NEUROLOGY | Age: 44
End: 2019-11-04

## 2019-11-04 ENCOUNTER — TELEPHONE (OUTPATIENT)
Dept: NEUROLOGY | Age: 44
End: 2019-11-04

## 2019-11-04 VITALS
HEIGHT: 61 IN | SYSTOLIC BLOOD PRESSURE: 112 MMHG | HEART RATE: 96 BPM | DIASTOLIC BLOOD PRESSURE: 74 MMHG | OXYGEN SATURATION: 95 % | WEIGHT: 149 LBS | BODY MASS INDEX: 28.13 KG/M2

## 2019-11-04 DIAGNOSIS — G43.719 CHRONIC MIGRAINE WITHOUT AURA, INTRACTABLE, WITHOUT STATUS MIGRAINOSUS: ICD-10-CM

## 2019-11-04 DIAGNOSIS — R42 VERTIGO: Primary | ICD-10-CM

## 2019-11-04 DIAGNOSIS — M54.81 OCCIPITAL NEURALGIA OF RIGHT SIDE: ICD-10-CM

## 2019-11-04 DIAGNOSIS — G44.86 CERVICOGENIC HEADACHE: ICD-10-CM

## 2019-11-04 RX ORDER — NORTRIPTYLINE HYDROCHLORIDE 10 MG/1
CAPSULE ORAL
Qty: 90 CAP | Refills: 0 | Status: SHIPPED | OUTPATIENT
Start: 2019-11-04 | End: 2020-01-23 | Stop reason: ALTCHOICE

## 2019-11-04 RX ORDER — MECLIZINE HCL 12.5 MG 12.5 MG/1
TABLET ORAL
COMMUNITY
End: 2019-11-11 | Stop reason: SDUPTHER

## 2019-11-04 RX ORDER — DIHYDROERGOTAMINE MESYLATE 4 MG/ML
1 SPRAY NASAL AS NEEDED
Qty: 2 CONTAINER | Refills: 0 | Status: SHIPPED | COMMUNITY
Start: 2019-11-04 | End: 2019-11-07 | Stop reason: SDUPTHER

## 2019-11-04 RX ORDER — LORAZEPAM 2 MG/1
TABLET ORAL
Qty: 2 TAB | Refills: 0 | Status: SHIPPED | OUTPATIENT
Start: 2019-11-04 | End: 2019-11-22

## 2019-11-04 NOTE — LETTER
11/5/19 Patient: Gabriele Barrera YOB: 1975 Date of Visit: 11/4/2019 Eliceo Gallegos MD 
[address] [place] [id number] VIA In Basket Dear Eliceo Gallegos MD, Thank you for referring Ms. Nora Vernon to [address] [place] [address] for evaluation. My notes for this consultation are attached. If you have questions, please do not hesitate to call me. I look forward to following your patient along with you. Sincerely, Josesito Deshpande MD

## 2019-11-04 NOTE — PATIENT INSTRUCTIONS
Vertigo: Care Instructions  Your Care Instructions    Vertigo is the feeling that you or your surroundings are moving when there is no actual movement. It is often described as a feeling of spinning, whirling, falling, or tilting. Vertigo may make you vomit or feel nauseated. You may have trouble standing or walking and may lose your balance. Vertigo is often related to an inner ear problem, but it can have other more serious causes. If vertigo continues, you may need more tests to find its cause. Follow-up care is a key part of your treatment and safety. Be sure to make and go to all appointments, and call your doctor if you are having problems. It's also a good idea to know your test results and keep a list of the medicines you take. How can you care for yourself at home? · Do not lie flat on your back. Prop yourself up slightly. This may reduce the spinning feeling. Keep your eyes open. · Move slowly so that you do not fall. · If your doctor recommends medicine, take it exactly as directed. · Do not drive while you are having vertigo. Certain exercises, called Wright-Daroff exercises, can help decrease vertigo. To do Wright-Daroff exercises:  · Sit on the edge of a bed or sofa and quickly lie down on the side that causes the worst vertigo. Lie on your side with your ear down. · Stay in this position for at least 30 seconds or until the vertigo goes away. · Sit up. If this causes vertigo, wait for it to stop. · Repeat the procedure on the other side. · Repeat this 10 times. Do these exercises 2 times a day until the vertigo is gone. When should you call for help? Call 911 anytime you think you may need emergency care. For example, call if:    · You passed out (lost consciousness).     · You have symptoms of a stroke. These may include:  ? Sudden numbness, tingling, weakness, or loss of movement in your face, arm, or leg, especially on only one side of your body.   ? Sudden vision changes. ? Sudden trouble speaking. ? Sudden confusion or trouble understanding simple statements. ? Sudden problems with walking or balance. ? A sudden, severe headache that is different from past headaches.    Call your doctor now or seek immediate medical care if:    · Vertigo occurs with a fever, a headache, or ringing in your ears.     · You have new or increased nausea and vomiting.    Watch closely for changes in your health, and be sure to contact your doctor if:    · Vertigo gets worse or happens more often.     · Vertigo has not gotten better after 2 weeks. Where can you learn more? Go to http://manuel-rody.info/. Enter Q321 in the search box to learn more about \"Vertigo: Care Instructions. \"  Current as of: October 21, 2018  Content Version: 12.2  © 9416-4011 [employer]. Care instructions adapted under license by [employer] (which disclaims liability or warranty for this information). If you have questions about a medical condition or this instruction, always ask your healthcare professional. [place] [address] any warranty or liability for your use of this information. Vertigo: Exercises  Introduction  Here are some examples of exercises for you to try. The exercises may be suggested for a condition or for rehabilitation. Start each exercise slowly. Ease off the exercises if you start to have pain. You will be told when to start these exercises and which ones will work best for you. How to do the exercises  Exercise 1    1. Stand with a chair in front of you and a wall behind you. If you begin to fall, you may use them for support. 2. Stand with your feet together and your arms at your sides. 3. Move your head up and down 10 times. Exercise 2    1. Move your head side to side 10 times. Exercise 3    1. Move your head diagonally up and down 10 times. Exercise 4    1.  Move your head diagonally up and down 10 times on the other side. Follow-up care is a key part of your treatment and safety. Be sure to make and go to all appointments, and call your doctor if you are having problems. It's also a good idea to know your test results and keep a list of the medicines you take. Where can you learn more? Go to http://manuel-rody.info/. Enter F349 in the search box to learn more about \"Vertigo: Exercises. \"  Current as of: October 21, 2018  Content Version: 12.2  © 3551-3195 [employer]. Care instructions adapted under license by [employer] (which disclaims liability or warranty for this information). If you have questions about a medical condition or this instruction, always ask your healthcare professional. [address] any warranty or liability for your use of this information. Migraine Headache: Care Instructions  Your Care Instructions  Migraines are painful, throbbing headaches that often start on one side of the head. They may cause nausea and vomiting and make you sensitive to light, sound, or smell. Without treatment, migraines can last from 4 hours to a few days. Medicines can help prevent migraines or stop them after they have started. Your doctor can help you find which ones work best for you. Follow-up care is a key part of your treatment and safety. Be sure to make and go to all appointments, and call your doctor if you are having problems. It's also a good idea to know your test results and keep a list of the medicines you take. How can you care for yourself at home? · Do not drive if you have taken a prescription pain medicine. · Rest in a quiet, dark room until your headache is gone. Close your eyes, and try to relax or go to sleep. Don't watch TV or read. · Put a cold, moist cloth or cold pack on the painful area for 10 to 20 minutes at a time. Put a thin cloth between the cold pack and your skin.   · Use a warm, moist towel or a heating pad set on low to relax tight shoulder and neck muscles. · Have someone gently massage your neck and shoulders. · Take your medicines exactly as prescribed. Call your doctor if you think you are having a problem with your medicine. You will get more details on the specific medicines your doctor prescribes. · Be careful not to take pain medicine more often than the instructions allow. You could get worse or more frequent headaches when the medicine wears off. To prevent migraines  · Keep a headache diary so you can figure out what triggers your headaches. Avoiding triggers may help you prevent headaches. Record when each headache began, how long it lasted, and what the pain was like. (Was it throbbing, aching, stabbing, or dull?) Write down any other symptoms you had with the headache, such as nausea, flashing lights or dark spots, or sensitivity to bright light or loud noise. Note if the headache occurred near your period. List anything that might have triggered the headache. Triggers may include certain foods (chocolate, cheese, wine) or odors, smoke, bright light, stress, or lack of sleep. · If your doctor has prescribed medicine for your migraines, take it as directed. You may have medicine that you take only when you get a migraine and medicine that you take all the time to help prevent migraines. ? If your doctor has prescribed medicine for when you get a headache, take it at the first sign of a migraine, unless your doctor has given you other instructions. ? If your doctor has prescribed medicine to prevent migraines, take it exactly as prescribed. Call your doctor if you think you are having a problem with your medicine. · Find healthy ways to deal with stress. Migraines are most common during or right after stressful times. Take time to relax before and after you do something that has caused a migraine in the past.  · Try to keep your muscles relaxed by keeping good posture.  Check your jaw, face, neck, and shoulder muscles for tension. Try to relax them. When you sit at a desk, change positions often. And make sure to stretch for 30 seconds each hour. · Get plenty of sleep and exercise. · Eat meals on a regular schedule. Avoid foods and drinks that often trigger migraines. These include chocolate, alcohol (especially red wine and port), aspartame, monosodium glutamate (MSG), and some additives found in foods (such as hot dogs, samuel, cold cuts, aged cheeses, and pickled foods). · Limit caffeine. Don't drink too much coffee, tea, or soda. But don't quit caffeine suddenly. That can also give you migraines. · Do not smoke or allow others to smoke around you. If you need help quitting, talk to your doctor about stop-smoking programs and medicines. These can increase your chances of quitting for good. · If you are taking birth control pills or hormone therapy, talk to your doctor about whether they are triggering your migraines. When should you call for help? Call 911 anytime you think you may need emergency care. For example, call if:    · You have signs of a stroke. These may include:  ? Sudden numbness, paralysis, or weakness in your face, arm, or leg, especially on only one side of your body. ? Sudden vision changes. ? Sudden trouble speaking. ? Sudden confusion or trouble understanding simple statements. ? Sudden problems with walking or balance. ? A sudden, severe headache that is different from past headaches.    Call your doctor now or seek immediate medical care if:    · You have new or worse nausea and vomiting.     · You have a new or higher fever.     · Your headache gets much worse.    Watch closely for changes in your health, and be sure to contact your doctor if:    · You are not getting better after 2 days (48 hours). Where can you learn more? Go to http://manuel-rody.info/.   Enter R979 in the search box to learn more about \"Migraine Headache: Care Instructions. \"  Current as of: March 28, 2019  Content Version: 12.2  © 3831-6191 [employer]. Care instructions adapted under license by [employer] (which disclaims liability or warranty for this information). If you have questions about a medical condition or this instruction, always ask your healthcare professional. [address] any warranty or liability for your use of this information. Neck: Exercises  Introduction  Here are some examples of exercises for you to try. The exercises may be suggested for a condition or for rehabilitation. Start each exercise slowly. Ease off the exercises if you start to have pain. You will be told when to start these exercises and which ones will work best for you. How to do the exercises  Neck stretch    1. This stretch works best if you keep your shoulder down as you lean away from it. To help you remember to do this, start by relaxing your shoulders and lightly holding on to your thighs or your chair. 2. Tilt your head toward your shoulder and hold for 15 to 30 seconds. Let the weight of your head stretch your muscles. 3. If you would like a little added stretch, use your hand to gently and steadily pull your head toward your shoulder. For example, keeping your right shoulder down, lean your head to the left. 4. Repeat 2 to 4 times toward each shoulder. Diagonal neck stretch    1. Turn your head slightly toward the direction you will be stretching, and tilt your head diagonally toward your chest and hold for 15 to 30 seconds. 2. If you would like a little added stretch, use your hand to gently and steadily pull your head forward on the diagonal.  3. Repeat 2 to 4 times toward each side. Dorsal glide stretch    1. Sit or stand tall and look straight ahead. 2. Slowly tuck your chin as you glide your head backward over your body  3. Hold for a count of 6, and then relax for up to 10 seconds.   4. Repeat 8 to 12 times.    Chest and shoulder stretch    1. Sit or stand tall and glide your head backward as in the dorsal glide stretch. 2. Raise both arms so that your hands are next to your ears. 3. Take a deep breath, and as you breathe out, lower your elbows down and behind your back. You will feel your shoulder blades slide down and together, and at the same time you will feel a stretch across your chest and the front of your shoulders. 4. Hold for about 6 seconds, and then relax for up to 10 seconds. 5. Repeat 8 to 12 times. Strengthening: Hands on head    1. Move your head backward, forward, and side to side against gentle pressure from your hands, holding each position for about 6 seconds. 2. Repeat 8 to 12 times. Follow-up care is a key part of your treatment and safety. Be sure to make and go to all appointments, and call your doctor if you are having problems. It's also a good idea to know your test results and keep a list of the medicines you take. Where can you learn more? Go to http://manuel-rody.info/. Enter P975 in the search box to learn more about \"Neck: Exercises. \"  Current as of: June 26, 2019  Content Version: 12.2  © 2732-4340 [employer]. Care instructions adapted under license by [employer] (which disclaims liability or warranty for this information). If you have questions about a medical condition or this instruction, always ask your healthcare professional. [place] [address] any warranty or liability for your use of this information. Shoulder Blade: Exercises  Introduction  Here are some examples of exercises for you to try. The exercises may be suggested for a condition or for rehabilitation. Start each exercise slowly. Ease off the exercises if you start to have pain. You will be told when to start these exercises and which ones will work best for you. How to do the exercises  Shoulder roll    1.  Stand tall with your chin slightly tucked. Imagine that a string at the top of your head is pulling you straight up. 2. Keep your arms relaxed. All motion will be in your shoulders. 3. Shrug your shoulders up toward your ears, then up and back. [employer] your shoulders down and back, like you're sliding your hands down into your back pants pockets. 4. Repeat the circles at least 2 to 4 times. 5. This exercise is also helpful anytime you want to relax. Lower neck and upper back stretch    1. With your arms about shoulder height, clasp your hands in front of you. 2. Drop your chin toward your chest.  3. Reach straight forward so you are rounding your upper back. Think about pulling your shoulder blades apart. Yuliya Garcia feel a stretch across your upper back and shoulders. Hold for at least 6 seconds. 4. Repeat 2 to 4 times. Triceps stretch    1. Reach your arm straight up. 2. Keeping your elbow in place, bend your arm and reach your hand down behind your back. 3. With your other hand, apply gentle pressure to the bent elbow. Yuliya Garcia feel a stretch at the back of your upper arm and shoulder. Hold about 6 seconds. 4. Repeat 2 to 4 times with each arm. Shoulder stretch    1. Relax your shoulders. 2. Raise one arm to shoulder height, and reach it across your chest.  3. Pull the arm slightly toward you with your other arm. This will help you get a gentle stretch. Hold for about 6 seconds. 4. Repeat 2 to 4 times. Shoulder blade squeeze    1. Sit or stand up tall with your arms at your sides. 2. Keep your shoulders relaxed and down, not shrugged. 3. Squeeze your shoulder blades together. Hold for 6 seconds, then relax. 4. Repeat 8 to 12 times. Straight-arm shoulder blade squeeze    1. Sit or stand tall. Relax your shoulders. 2. With palms down, hold your elastic tubing or band straight out in front of you. 3. Start with slight tension in the tubing or band, with your hands about shoulder-width apart.   4. Slowly pull straight out to the sides, squeezing your shoulder blades together. Keep your arms straight and at shoulder height. Slowly release. 5. Repeat 8 to 12 times. Rowing    1. Cades your elastic tubing or band at about waist height. Take one end in each hand. 2. Sit or stand with your feet hip-width apart. 3. Hold your arms straight in front of you. Adjust your distance to create slight tension in the tubing or band. 4. Slightly tuck your chin. Relax your shoulders. 5. Without shrugging your shoulders, pull straight back. Your elbows will pass alongside your waist.    Pull-downs    1. Cades your elastic tubing or band in the top of a closed door. Take one end in each hand. 2. Either sit or stand, depending on what is more comfortable. If you feel unsteady, sit on a chair. 3. Start with your arms up and comfortably apart, elbows straight. There should be a slight tension in the tubing or band. 4. Slightly tuck your chin, and look straight ahead. 5. Keeping your back straight, slowly pull down and back, bending your elbows. 6. Stop where your hands are level with your chin, in a \"goalpost\" position. 7. Repeat 8 to 12 times. Chest T stretch    1. Lie on your back. Raise your knees so they are bent. Plant your feet on the floor, hip-width apart. 2. Tuck your chin, and relax your shoulders. 3. Reach your arms straight out to the sides. If you don't feel a mild stretch in your shoulders and across your chest, use a foam roll or a tightly rolled blanket under your spine, from your tailbone to your head. 4. Relax in this position for at least 15 to 30 seconds while you breathe normally. Repeat 2 to 4 times. 5. As you get used to this stretch, keep adding a little more time until you are able relax in this position for 2 or 3 minutes. When you can relax for at least 2 minutes, you only need to do the exercise 1 time per session. Chest goalpost stretch    1. Lie on your back.  Raise your knees so they are bent. Plant your feet on the floor, hip-width apart. 2. Tuck your chin, and relax your shoulders. 3. Reach your arms straight out to the sides. 4. Bend your arms at the elbows, with your hands pointed toward the top of your head. Your arms should make an L on either side of your head. Your palms should be facing up. 5. If you don't feel a mild stretch in your shoulders and across your chest, use a foam roll or tightly rolled blanket under your spine, from your tailbone to your head. 6. Relax in this position for at least 15 to 30 seconds while you breathe normally. Repeat 2 to 4 times. 7. Each day you do this exercise, add a little more time until you can relax in this position for 2 or 3 minutes. When you can relax for at least 2 minutes, you only need to do the exercise 1 time per session. Follow-up care is a key part of your treatment and safety. Be sure to make and go to all appointments, and call your doctor if you are having problems. It's also a good idea to know your test results and keep a list of the medicines you take. Where can you learn more? Go to http://manuel-rody.info/. Enter [phone] in the search box to learn more about \"Shoulder Blade: Exercises. \"  Current as of: June 26, 2019  Content Version: 12.2  © 2916-8899 [employer]. Care instructions adapted under license by [employer] (which disclaims liability or warranty for this information). If you have questions about a medical condition or this instruction, always ask your healthcare professional. [place] [address] any warranty or liability for your use of this information. Neck Pain: Care Instructions  Your Care Instructions    You can have neck pain anywhere from the bottom of your head to the top of your shoulders. It can spread to the upper back or arms.  Injuries, painting a ceiling, sleeping with your neck twisted, staying in one position for too long, and many other activities can cause neck pain. Most neck pain gets better with home care. Your doctor may recommend medicine to relieve pain or relax your muscles. He or she may suggest exercise and physical therapy to increase flexibility and relieve stress. You may need to wear a special (cervical) collar to support your neck for a day or two. Follow-up care is a key part of your treatment and safety. Be sure to make and go to all appointments, and call your doctor if you are having problems. It's also a good idea to know your test results and keep a list of the medicines you take. How can you care for yourself at home? · Try using a heating pad on a low or medium setting for 15 to 20 minutes every 2 or 3 hours. Try a warm shower in place of one session with the heating pad. · You can also try an ice pack for 10 to 15 minutes every 2 to 3 hours. Put a thin cloth between the ice and your skin. · Take pain medicines exactly as directed. ? If the doctor gave you a prescription medicine for pain, take it as prescribed. ? If you are not taking a prescription pain medicine, ask your doctor if you can take an over-the-counter medicine. · If your doctor recommends a cervical collar, wear it exactly as directed. When should you call for help? Call your doctor now or seek immediate medical care if:    · You have new or worsening numbness in your arms, buttocks or legs.     · You have new or worsening weakness in your arms or legs. (This could make it hard to stand up.)     · You lose control of your bladder or bowels.    Watch closely for changes in your health, and be sure to contact your doctor if:    · Your neck pain is getting worse.     · You are not getting better after 1 week.     · You do not get better as expected. Where can you learn more? Go to http://manuel-rody.info/. Enter [id number] in the search box to learn more about \"Neck Pain: Care Instructions. \"  Current as of: June 26, 2019  Content Version: 12.2  © 4331-7490 [employer]. Care instructions adapted under license by [employer] (which disclaims liability or warranty for this information). If you have questions about a medical condition or this instruction, always ask your healthcare professional. [address] any warranty or liability for your use of this information.

## 2019-11-04 NOTE — TELEPHONE ENCOUNTER
Re: ONB injections: CPT 41228 and 50196    Called Stephanie (medical) No Pre Cert required and was told both codes are covered services.      Call ref today # [id number] at 10:27 am.

## 2019-11-04 NOTE — PROGRESS NOTES
NEUROLOGY CLINIC NOTE    Patient ID:  Rogelio Anders  [id number]  75 y.o.  1975    Date of Visit:  2019    Referring Physician: Dr. Mauricio Sadler    Reason for Visit:  Chronic headache, dizziness/vertigo, wobbliness    Chief Complaint   Patient presents with    Follow-up       History of Present Illness:     Patient Active Problem List    Diagnosis Date Noted    Pain in joint of right foot 10/01/2019    [facility]ipheral vascular disease ([facility] 2019    Ventral hernia 2019    Anxiety     Intractable migraine with aura with status migrainosus 2016    Subacute maxillary sinusitis 2016     Past Medical History:   Diagnosis Date    Anxiety     Asthma     Depression     Factor V deficiency ([facility]     Heart rate fast     Long term current use of anticoagulant therapy     Migraine     Thromboembolus ([facility] 2006    DVT left groin      Past Surgical History:   Procedure Laterality Date    ABDOMEN SURGERY PROC UNLISTED      laproscopy removed adhesions on colon    DILATION AND CURETTAGE      HX APPENDECTOMY      HX  SECTION      HX HERNIA REPAIR  2019     Renetta Pham incarcerated ventral hernia repair    HX HYSTERECTOMY  2018    HX ORTHOPAEDIC  2009    bone removed from foot    HX TUBAL LIGATION  c sec      Prior to Admission medications    Medication Sig Start Date End Date Taking? Authorizing Provider   meclizine (ANTIVERT) 12.5 mg tablet Take  by mouth three (3) times daily as needed. Yes Provider, Historical   ondansetron (ZOFRAN ODT) 4 mg disintegrating tablet Take 1 Tab by mouth every eight (8) hours as needed for Nausea. 10/25/19  Yes Monica Perez MD   naratriptan [facility]) 2.5 mg tab Take 1 at onset of headache; may repeat another dose after 4 hours if headaches persist 10/17/19  Yes Monica Perez MD   ascorbic acid, vitamin C, (VITAMIN C) 100 mg tab Take 1 Tab by mouth two (2) times a day.  10/1/19  Yes Jj Bryant M, DO   fremanezumab-vfrm (AJOVY) 225 mg/1.5 mL syrg 225 mg by SubCUTAneous route every month. 9/3/19  Yes Joann Moses MD   sertraline (ZOLOFT) 100 mg tablet TAKE 1 TABLET BY MOUTH ONCE DAILY 8/19/19  Yes Norma Sanchez MD   omeprazole (PRILOSEC) 20 mg capsule Take 20 mg by mouth daily. Yes Provider, Historical   zinc 50 mg tab tablet Take  by mouth daily. Yes Provider, Historical   acetaminophen (TYLENOL EXTRA STRENGTH) 500 mg tablet Take 1,000 mg by mouth. Yes Provider, Historical   ALPRAZolam (XANAX) 0.5 mg tablet Take 1 Tab by mouth two (2) times daily as needed for Anxiety. Max Daily Amount: 1 mg. 7/2/19  Yes Norma Sanchez MD   albuterol-ipratropium (DUO-NEB) 2.5 mg-0.5 mg/3 ml nebu USE 1 VIAL VIA NEBULIZER EVERY 6 HOURS AS NEEDED 12/19/18  Yes Norma Sanchez MD   albuterol (PROVENTIL HFA) 90 mcg/actuation inhaler Take 2 Puffs by inhalation every four (4) hours as needed for Wheezing. 12/4/17  Yes Norma Sanchez MD   aspirin 81 mg chewable tablet Take 81 mg by mouth daily. Yes Other, MD Phani   promethazine-dextromethorphan (PROMETHAZINE-DM) 6.25-15 mg/5 mL syrup TAKE 5 ML BY MOUTH EVERY FOUR (4) HOURS AS NEEDED FOR COUGH FOR UP TO 7 DAYS. 9/24/19   Provider, Historical   pregabalin (LYRICA) 75 mg capsule Take 1 Cap by mouth two (2) times a day. Max Daily Amount: 150 mg. 10/1/19   Maeve Benson,    diclofenac EC (VOLTAREN) 75 mg EC tablet Take 1 Tab by mouth two (2) times daily (with meals). 8/19/19   Norma Sanchez MD   cetirizine-pseudoePHEDrine (ZYRTEC-D) 5-120 mg Tb12 Take 1 Tab by mouth two (2) times a day. Provider, Historical   Magnesium Oxide 500 mg cap Take  by mouth. Provider, Historical   ferrous sulfate (IRON) 325 mg (65 mg iron) tablet Take  by mouth Daily (before breakfast). Provider, Historical   potassium 99 mg tablet Take 99 mg by mouth daily.     Provider, Historical   cyanocobalamin (VITAMIN B12) 500 mcg tablet Take 500 mcg by mouth daily. Provider, Historical   baclofen (LIORESAL) 10 mg tablet Take 1 tab at bedtime x 1 wk; then 1 tab BID x 1 wk; then 1 tab TID 7/30/19   Abbie Elias MD   butalbital-acetaminophen-caffeine (FIORICET, ESGIC) -40 mg per tablet TAKE 1 TABLET BY MOUTH EVERY 6 HOURS AS NEEDED FOR PAIN 7/8/19   Bladimir Goyal MD   chlorpheniramine-HYDROcodone (TUSSIONEX) 10-8 mg/5 mL suspension Take 5 mL by mouth every twelve (12) hours as needed for Cough. Max Daily Amount: 10 mL. Patient not taking: Reported on 9/19/2019 12/4/17   Bladimir Goyal MD     Allergies   Allergen Reactions    Adhesive Tape-Silicones Other (comments)     Pt states this burns her skin. Social History     Tobacco Use    Smoking status: Former Smoker     Packs/day: 0.50     Years: 21.00     Pack years: 10.50     Types: Cigarettes     Last attempt to quit: 3/5/2016     Years since quitting: 3.6    Smokeless tobacco: Never Used   Substance Use Topics    Alcohol use: Yes     Alcohol/week: 0.0 standard drinks     Comment: Occ      Family History   Problem Relation Age of Onset    Diabetes Mother     Alzheimer Mother     Hypertension Mother     High Cholesterol Mother     Headache Mother     Heart Disease Mother     Cancer Father     Headache Father     Heart Disease Father     Diabetes Sister     No Known Problems Brother     Immunodeficiency Sister     No Known Problems Sister     Cancer Maternal Uncle     Cancer Maternal Grandfather         Subjective:      Vinod Mckoy is a 38 y.o. UBBE wit history of headaches, anxiety, depression, asthma, factor V deficiency and left groin DVT who was referred here by Lidia Severs for further evaluation of her headaches. Per patient condition started around 19 years PTC. Worse because it is more intense or frequent. S/P hysterectomy last year. Wake up with the headache or may wake her up.  Right temporo-parietal radiates the neck, scapula or shoulder. Sharp and like a knife sticking. 1/10 to a at its worst a 10/10. Associated with blurring of vision, light and noise sensitivity, nausea and vomiting. Lasts 4 hours to as long as 4 to 5 days. Occurs 4 to 5 times a week. >20 headaches per month. Last severe headache was yesterday. Takes Fioricet and Zofran which dulls it down but not take it away  ER last week for the headache - [facility]    She does not take any daily maintenance medication for the headaches. Previously saw a neurologist around 17 years ago and had occipital nerve block done without relief. Does desk and computer work. Review of records here revealed a head CT without contrast done 11/19/2012 for migraine headaches revealed no acute abnormality. Cervical spine x-ray done 3/1/2011 was normal.    CBC, CMP done by PCP 5/13/2019 was unremarkable. Patient was last seen by her PCP 7/1/2019 for abdominal pain and headache. Plan for migraine was to see us. Patient prescribed trazodone 50 mg at bedtime for insomnia. Medications previously tried for headaches: Ibuprofen, Advil, Aleve, aspirin, Tylenol, Excedrin, Tylenol 3, Toradol, Fioricet, Imitrex, Darvocet, Vicodin, Percocet, oxycodone, Zofran, Lopressor, Depakote, amitriptyline, Flexeril, Flonase, Prilosec, Cymbalta, Skelaxin, Lexapro, Effexor, magnesium, Celebrex, prednisone, Xanax, Benadryl, lidocaine, melatonin, Paxil, Wellbutrin, Lyrica, antihistamine, Relpax, Ajovy, Naratriptan, Baclofen    Since the last visit on 9/3/2019, review of records reveal patient was seen by her PCP 10/17/2019 for 3-day history of right throbbing headache with severe vertigo. Associated with fatigue and nausea. Patient was given ketorolac and promethazine injection. Patient was also prescribed meclizine. This provided some temporary relief. Patient again called her PCP office 10/21/2019 for another bout of severe headache and vertigo. Advised to go to the ER.   Patient reportedly went to [facility] ER in [place] where head CT without contrast was done and per patient was unremarkable. Treated with cocktail of medication which offered again temporary relief. She continues to complain of her right-sided headache currently an 8/10 associated with episodes of dizziness and vertigo. It is not positional.  It occurs when at rest or ambulating. Associate with wobbliness and nausea. Limits her ability to drive currently. She currently takes meclizine 50 mg every 8 hours which does not offer sustained benefit. Naratriptan does not abort her headaches. She apparently stopped taking baclofen. She again has issues with neck and shoulder stiffness. Still having >20 headaches per month. She is on Ajovy 225 mg subcutaneous injection every month x 3 doses. Denies any side effects. No clear benefit. She also takes as needed Fioricet for abortive therapy. Review of records reveal patient was seen by her PCP 9/19/2019 and at the ER on the same day as well for persistent right lower leg and right foot pain. Lower extremity Doppler studies was negative for DVT. Ankle-brachial index was normal.  Patient was then seen by orthopedics 10/1/2019. X-ray of the right foot revealed no acute abnormality. MRI of the right foot done 10/10/2019 revealed mild diffuse muscular edema and subcutaneous edema which may be related to patient's recent trauma but no occult fracture or ligament injury was seen.      Outside reports reviewed: office notes, ER records, radiology reports    Review of Systems:    A comprehensive review of systems was performed:   Positive for poor appetite, fatigue, blurry vision, nausea, vomiting, headaches, dizziness/vertigo, balance issues, slurred speech, easy bruising    Objective:     Visit Vitals  /74   Pulse 96   Ht 5' 1\" (1.549 m)   Wt 149 lb (67.6 kg)   LMP 06/10/2017   SpO2 95%   BMI 28.15 kg/m²     8/10 headache    PHYSICAL EXAM:    Extremity: Right foot in a boot.      NEUROLOGICAL EXAM:    Appearance: The patient is well developed, well nourished, provides a coherent history and is in no acute distress. Mental Status: Oriented to time, place and person. Fluent, no aphasia or dysarthria. Mood and affect appropriate. Cranial Nerves:   II - XII were intact except for right end gaze nystagmus. Motor:  5/5 strength. Normal bulk and tone. No fasciculations. No pronator drift. Reflexes:   Deep tendon reflexes were symmetrical.    Sensory:   Normal to cold, pinprick and vibration. Gait:  Right foot is in a boot. Unsteady. (+) Romberg   Tremor:   No tremor noted. Cerebellar:  Intact FTN/LA/HTS. Severe anterior head posture (3-4 FB off shoulder) with shoulders rotated in; increase right SCM  (+) tenderness on palpation right occiput    Imaging  Right foot MRI    Procedure:  Right greater occipital nerve block  Medication: 8 cc of 0.50% Bupivacaine (preservative-free)    Informed consent was obtained. The patient was placed in a seated position. The occiput was palpated at the point of the superior nuchal line, to identify the tenderness associated with the passing of the greater occipital nerve. The overlying skin was then sterilely prepped with alcohol. Next, using sterile technique, a 27 gauge 1 1/4 inch-long needle was inserted into the subcutaneous tissue in proximity to the nerve but not directly on top of the nerve. There was no blood on aspiration. The above medication solution was then gradually injected. No paresthesia were reported by the patient. The injection site was then cleansed by alcohol. The patient tolerated the procedure well. There were no complications. The patient was observed for an adequate period of time postoperatively. The patient's pain was well-controlled and there were no new neurological deficits. The patient was asked to contact us if any questions arose.     Assessment:   Vertigo  Chronic intractable migraine without aura -worsening  Right-sided occipital neuralgia - worsening  Cervicogenic headaches - worsening    Plan:   Neurological examination reveals right end gaze nystagmus, unsteady gait and positive Romberg. Need to rule out brainstem or cerebellar stroke versus tumor. Stat brain MRI with and without contrast was ordered for further evaluation. Lorazepam 2 mg as needed was prescribed for sedation. I need to obtain [facility]. Further intervention be done pending results of MRI. Letter was provided for patient to be out of work until 11/11/2019. Worse headaches consistent with migraines currently intractable. Trial of nortriptyline 10 mg initially and up to 30 mg if necessary. Prescriptions provided. Continue Ayovy 225 mg subcutaneous injection every month for now. Given the patient also has greater than 20 headaches per month, trial of chemodenervation with Botox 155 units using the chronic migraine protocol will be done. Order given. Authorization to be obtained. Trial of Migranal nasal spray for abortive therapy. Samples were provided. Prescription to follow if response is seen. Advised to refrain from things that could trigger a headache (dairy, chocolate, wine and etc.). Advised to keep a headache log. Examined history also reveals significant elements of right occipital neuralgia triggering her migraine headaches due to poor anterior head posture. Discussed trial of occipital nerve block to break the current cycle of her headache. Patient agreed. Informed consent was obtained. Right occipital nerve block was done with good response. Monitor for sustained benefit. Encouraged to continue to correct head posture. Exam also reveals elements of cervicogenic headaches due to poor anterior head posture. Previous range of motion measurements revealed mild restriction head extension and right lateral head flexion. Encouraged to continue to correct her anterior head posture.   Use headrest at work, at home and while driving. Use wireless headsets. Do not carry anything the shoulder. Use at most one pillow when sleeping. Continue neck and shoulder exercises. Patient was referred to physical therapy for more aggressive manipulation including dry needling. All questions and concerns were answered. This note was created using voice recognition software. Despite editing, there may be syntax errors.

## 2019-11-04 NOTE — LETTER
NOTIFICATION RETURN TO WORK  
 
11/4/2019 10:47 AM 
 
Ms. Naveed Costello [address] [place] [address] To Whom It May Concern: 
 
Naveed Costello is currently under the care of [address] [place] [address]. She will return to work on: 11/11/2019. If there are questions or concerns please have the patient contact our office. Sincerely, Wei Yang MD

## 2019-11-05 ENCOUNTER — TELEPHONE (OUTPATIENT)
Dept: FAMILY MEDICINE CLINIC | Age: 44
End: 2019-11-05

## 2019-11-05 ENCOUNTER — HOSPITAL ENCOUNTER (OUTPATIENT)
Dept: MRI IMAGING | Age: 44
Discharge: HOME | End: 2019-11-05
Attending: PSYCHIATRY & NEUROLOGY
Payer: COMMERCIAL

## 2019-11-05 DIAGNOSIS — R42 VERTIGO: ICD-10-CM

## 2019-11-05 PROCEDURE — 70551 MRI BRAIN STEM W/O DYE: CPT

## 2019-11-05 NOTE — TELEPHONE ENCOUNTER
----- Message from Cary Ortiz sent at 11/4/2019  5:00 PM EST -----  Regarding: / Telephone   General Message/Vendor Calls    Caller's first and last name:  Cristina Munoz- From [address]     Reason for call:  Confirm Pt info     Callback required yes/no and why:  Yes, to confirm the info     Best contact number(s):  [phone] Reference number- [id number]    Details to clarify the request:  Confirm dates of service and that a medical records request was received.      Cary Ortiz

## 2019-11-05 NOTE — PROGRESS NOTES
Spoke to patient. I reviewed the brain myself. There were enough sequences without any movement artifact. Unremarkable MRI. No brainstem or cerebellar stroke or abnormality seen. Cerebellar tonsils are not that low. Issue with vertigo is likely related to the exacerbation of her headaches. Start treatment as planned during yesterdays visit.

## 2019-11-06 ENCOUNTER — DOCUMENTATION ONLY (OUTPATIENT)
Dept: NEUROLOGY | Age: 44
End: 2019-11-06

## 2019-11-07 ENCOUNTER — TELEPHONE (OUTPATIENT)
Dept: NEUROLOGY | Age: 44
End: 2019-11-07

## 2019-11-07 DIAGNOSIS — G43.719 CHRONIC MIGRAINE WITHOUT AURA, INTRACTABLE, WITHOUT STATUS MIGRAINOSUS: ICD-10-CM

## 2019-11-08 RX ORDER — DIHYDROERGOTAMINE MESYLATE 4 MG/ML
SPRAY NASAL
Qty: 6 CONTAINER | Refills: 3 | Status: SHIPPED | OUTPATIENT
Start: 2019-11-08 | End: 2020-01-23 | Stop reason: ALTCHOICE

## 2019-11-08 NOTE — TELEPHONE ENCOUNTER
Received a refill request for    Requested Prescriptions     Pending Prescriptions Disp Refills    dihydroergotamine (MIGRANAL) 0.5 mg/pump act. (4 mg/mL) nasal spray 2 Container 0     Si Spray by Nasal route as needed for Migraine. use in one nostril as directed.   No more than 4 sprays in one hour     Future Appointments   Date Time Provider Demarco Booth   2019  3:40 Tavia Garrett  [facility]                         Last Appointment My Department:  2019    Please review

## 2019-11-11 ENCOUNTER — TELEPHONE (OUTPATIENT)
Dept: NEUROLOGY | Age: 44
End: 2019-11-11

## 2019-11-11 RX ORDER — RIZATRIPTAN BENZOATE 10 MG/1
TABLET, ORALLY DISINTEGRATING ORAL
Qty: 9 TAB | Refills: 1 | Status: SHIPPED | OUTPATIENT
Start: 2019-11-11 | End: 2020-01-23 | Stop reason: SDUPTHER

## 2019-11-11 RX ORDER — MECLIZINE HYDROCHLORIDE 25 MG/1
TABLET ORAL
Refills: 1 | COMMUNITY
Start: 2019-11-06 | End: 2019-11-22 | Stop reason: SDUPTHER

## 2019-11-11 RX ORDER — KETOROLAC TROMETHAMINE 10 MG/1
10 TABLET, FILM COATED ORAL EVERY 6 HOURS
Qty: 20 TAB | Refills: 0 | Status: SHIPPED | OUTPATIENT
Start: 2019-11-11 | End: 2019-12-06 | Stop reason: SDUPTHER

## 2019-11-11 NOTE — TELEPHONE ENCOUNTER
Received call from patient, she stated that she woke up with a really bad migraine this morning and doesn't want to go to the ER. She said that her insurance will not cover the nasal spray that was prescribed, and she wants to know if something can be prescribed.       [phone]

## 2019-11-14 ENCOUNTER — TELEPHONE (OUTPATIENT)
Dept: FAMILY MEDICINE CLINIC | Age: 44
End: 2019-11-14

## 2019-11-14 DIAGNOSIS — R42 VERTIGO: Primary | ICD-10-CM

## 2019-11-14 NOTE — TELEPHONE ENCOUNTER
Jude Phillips  Female, 38 y.o., 1975  Weight:   149 lb (67.6 kg)  MRN:   [id number]  Phone:   [phone] (M)  PCP:   Fredick Goodpasture, MD  Primary Cvg:   [facility]  Last Appt With Me  None  Next Appt With Me  None  Next Appt  12/6/19 - Ashlee Evans MD - [phone]  Referral Request     From  Kera Delgado To  [facility] Nurse Pool Sent  11/14/2019  7:05 AM   The neurologist told me due to still having vertigo I need to see an ENT. Who would you recommend I see? I am still out of work due to not being able to drive. Anything you can do would be appreciated.  Thank you.     Encounter Messages     Read Composed From To Subject   Y 11/14/2019  7:05 AM Peggy Rothman MD Referral Request

## 2019-11-15 DIAGNOSIS — G43.909 MIGRAINE SYNDROME: ICD-10-CM

## 2019-11-15 DIAGNOSIS — G43.109 MIGRAINE WITH VERTIGO: ICD-10-CM

## 2019-11-15 RX ORDER — MECLIZINE HYDROCHLORIDE 25 MG/1
50 TABLET ORAL
Qty: 50 TAB | Refills: 1 | Status: SHIPPED | OUTPATIENT
Start: 2019-11-15 | End: 2019-11-25

## 2019-11-18 ENCOUNTER — TELEPHONE (OUTPATIENT)
Dept: NEUROLOGY | Age: 44
End: 2019-11-18

## 2019-11-18 ENCOUNTER — TELEPHONE (OUTPATIENT)
Dept: FAMILY MEDICINE CLINIC | Age: 44
End: 2019-11-18

## 2019-11-18 NOTE — TELEPHONE ENCOUNTER
Re: Botox denial     Dr. Sujit Black Breonna - on it for three months with no benefit. Resubmitted Botox PA to [facility] faxed manually.

## 2019-11-18 NOTE — TELEPHONE ENCOUNTER
Jude Phillips  Female, 38 y.o., 1975  Weight:   149 lb (67.6 kg)  MRN:   [id number]  Phone:   [phone] (M)  PCP:   Henri Hale MD  Primary Cvg:   [facility] - [address] With Me  None  Next Appt With Me  None  Next Appt  12/6/19 - Joy Peoples MD - [facility] [phone]  Referral Request     From  Gabriele Barrera To  [facility] Nurse Pool Sent  11/18/2019  7:11 AM   Have you all been able to schedule an appointment with the  ENT yet that specializes in vertigo? I will need a doctor's note  still can not drive. Can you please call and let me know what is going on? Thank you and have a great day.     Encounter Messages     Read Composed From To Subject   Y 11/18/2019  7:11 AM Diana Valerio MD Referral Request

## 2019-11-18 NOTE — TELEPHONE ENCOUNTER
Botox - submitted to [facility] 11/15/19 -     rec'd denial - cannot be on Ajovy and Botox together. Forward to Dr. Godfrey Rubalcava for review.

## 2019-11-19 ENCOUNTER — TELEPHONE (OUTPATIENT)
Dept: NEUROLOGY | Age: 44
End: 2019-11-19

## 2019-11-20 ENCOUNTER — DOCUMENTATION ONLY (OUTPATIENT)
Dept: NEUROLOGY | Age: 44
End: 2019-11-20

## 2019-11-20 NOTE — TELEPHONE ENCOUNTER
Left message for patient stating that I faxed over the paperwork this working and if she wanted to she was more than welcome to call back.

## 2019-11-21 ENCOUNTER — TELEPHONE (OUTPATIENT)
Dept: FAMILY MEDICINE CLINIC | Age: 44
End: 2019-11-21

## 2019-11-21 ENCOUNTER — TELEPHONE (OUTPATIENT)
Dept: NEUROLOGY | Age: 44
End: 2019-11-21

## 2019-11-21 NOTE — TELEPHONE ENCOUNTER
Botox approved on appeal:     Auth [id number] 11/15/19  - 11/15/20 both  and [id number]. Shari Peabody has merged w/ Rafita Bustamante,     Please fax Rx and auth info to [facility] or email using the word safemail in subject line to:Shey Baltazar - emailed you her contact info. And she will process it for delivery.

## 2019-11-21 NOTE — TELEPHONE ENCOUNTER
Jude Phillips  Female, 38 y.o., 1975  Weight:   149 lb (67.6 kg)  MRN:   [id number]  Phone:   [phone] (M)  PCP:   Best Smith MD  Primary Cvg:   MIL/[facility] RULE - [address] With Me  None  Next Appt With Me  None  Next Appt  12/6/19 - Nataly Jiménez MD - [facility] [place] [phone]  Non-Urgent Medical Question     From  [facility] To  [facility] Nurse Pool Sent  11/20/2019  4:00 PM   Have appointment for follow up with Dr. Junior ePter on Dec. 9. Put out until then for no driving no work    Encounter Messages     Read Composed From To Subject   Y 11/20/2019  4:00 PM Doc Ascencio MD Non-Urgent Medical Question

## 2019-11-21 NOTE — TELEPHONE ENCOUNTER
Jude Phillips  Female, 38 y.o., 1975  Weight:   149 lb (67.6 kg)  MRN:   [id number]  Phone:   [phone] [facility]  PCP:   Norma Sanchez MD  Primary Cvg:   [facility] - [facility]  Last Appt With Me  None  Next Appt With Me  None  Next Appt  12/6/19 - Joann Moses MD - [address]  Non-Urgent Medical Question     From  Devyn Corona To  [facility] Nurse Pool Sent  11/20/2019  3:31 PM   Went to Dr. Ofelia Mills, I have significant hearing loss in right ear. Dec. 5th have balance test done. Can not drive or work until after that. He said I had to get a note from you as this was my first visit.     Encounter Messages     Read Composed From To Subject   Y 11/20/2019  3:31 PM Jamir Yeh MD Non-Urgent Medical Question

## 2019-11-22 ENCOUNTER — OFFICE VISIT (OUTPATIENT)
Dept: NEUROLOGY | Age: 44
End: 2019-11-22

## 2019-11-22 ENCOUNTER — TELEPHONE (OUTPATIENT)
Dept: FAMILY MEDICINE CLINIC | Age: 44
End: 2019-11-22

## 2019-11-22 VITALS
WEIGHT: 145 LBS | BODY MASS INDEX: 27.38 KG/M2 | HEIGHT: 61 IN | SYSTOLIC BLOOD PRESSURE: 108 MMHG | DIASTOLIC BLOOD PRESSURE: 74 MMHG | HEART RATE: 116 BPM | OXYGEN SATURATION: 99 %

## 2019-11-22 DIAGNOSIS — M54.81 OCCIPITAL NEURALGIA OF RIGHT SIDE: ICD-10-CM

## 2019-11-22 DIAGNOSIS — G44.86 CERVICOGENIC HEADACHE: ICD-10-CM

## 2019-11-22 DIAGNOSIS — G43.719 CHRONIC MIGRAINE WITHOUT AURA, INTRACTABLE, WITHOUT STATUS MIGRAINOSUS: Primary | ICD-10-CM

## 2019-11-22 DIAGNOSIS — R42 VERTIGO: ICD-10-CM

## 2019-11-22 RX ORDER — TRAMADOL HYDROCHLORIDE 50 MG/1
50 TABLET ORAL
Qty: 60 TAB | Refills: 0 | Status: SHIPPED | OUTPATIENT
Start: 2019-11-22 | End: 2019-12-22

## 2019-11-22 RX ORDER — KETOROLAC TROMETHAMINE 30 MG/ML
60 INJECTION, SOLUTION INTRAMUSCULAR; INTRAVENOUS ONCE
Qty: 1 VIAL | Refills: 0
Start: 2019-11-22 | End: 2019-11-22

## 2019-11-22 RX ORDER — METHYLPREDNISOLONE 4 MG/1
TABLET ORAL
Qty: 1 DOSE PACK | Refills: 0 | Status: SHIPPED | OUTPATIENT
Start: 2019-11-22 | End: 2019-12-10 | Stop reason: ALTCHOICE

## 2019-11-22 NOTE — PATIENT INSTRUCTIONS
Migraine Headache: Care Instructions Your Care Instructions Migraines are painful, throbbing headaches that often start on one side of the head. They may cause nausea and vomiting and make you sensitive to light, sound, or smell. Without treatment, migraines can last from 4 hours to a few days. Medicines can help prevent migraines or stop them after they have started. Your doctor can help you find which ones work best for you. Follow-up care is a key part of your treatment and safety. Be sure to make and go to all appointments, and call your doctor if you are having problems. It's also a good idea to know your test results and keep a list of the medicines you take. How can you care for yourself at home? · Do not drive if you have taken a prescription pain medicine. · Rest in a quiet, dark room until your headache is gone. Close your eyes, and try to relax or go to sleep. Don't watch TV or read. · Put a cold, moist cloth or cold pack on the painful area for 10 to 20 minutes at a time. Put a thin cloth between the cold pack and your skin. · Use a warm, moist towel or a heating pad set on low to relax tight shoulder and neck muscles. · Have someone gently massage your neck and shoulders. · Take your medicines exactly as prescribed. Call your doctor if you think you are having a problem with your medicine. You will get more details on the specific medicines your doctor prescribes. · Be careful not to take pain medicine more often than the instructions allow. You could get worse or more frequent headaches when the medicine wears off. To prevent migraines · Keep a headache diary so you can figure out what triggers your headaches. Avoiding triggers may help you prevent headaches. Record when each headache began, how long it lasted, and what the pain was like.  (Was it throbbing, aching, stabbing, or dull?) Write down any other symptoms you had with the headache, such as nausea, flashing lights or dark spots, or sensitivity to bright light or loud noise. Note if the headache occurred near your period. List anything that might have triggered the headache. Triggers may include certain foods (chocolate, cheese, wine) or odors, smoke, bright light, stress, or lack of sleep. · If your doctor has prescribed medicine for your migraines, take it as directed. You may have medicine that you take only when you get a migraine and medicine that you take all the time to help prevent migraines. ? If your doctor has prescribed medicine for when you get a headache, take it at the first sign of a migraine, unless your doctor has given you other instructions. ? If your doctor has prescribed medicine to prevent migraines, take it exactly as prescribed. Call your doctor if you think you are having a problem with your medicine. · Find healthy ways to deal with stress. Migraines are most common during or right after stressful times. Take time to relax before and after you do something that has caused a migraine in the past. 
· Try to keep your muscles relaxed by keeping good posture. Check your jaw, face, neck, and shoulder muscles for tension. Try to relax them. When you sit at a desk, change positions often. And make sure to stretch for 30 seconds each hour. · Get plenty of sleep and exercise. · Eat meals on a regular schedule. Avoid foods and drinks that often trigger migraines. These include chocolate, alcohol (especially red wine and port), aspartame, monosodium glutamate (MSG), and some additives found in foods (such as hot dogs, samuel, cold cuts, aged cheeses, and pickled foods). · Limit caffeine. Don't drink too much coffee, tea, or soda. But don't quit caffeine suddenly. That can also give you migraines. · Do not smoke or allow others to smoke around you. If you need help quitting, talk to your doctor about stop-smoking programs and medicines. These can increase your chances of quitting for good. · If you are taking birth control pills or hormone therapy, talk to your doctor about whether they are triggering your migraines. When should you call for help? Call 911 anytime you think you may need emergency care. For example, call if: 
  · You have signs of a stroke. These may include: 
? Sudden numbness, paralysis, or weakness in your face, arm, or leg, especially on only one side of your body. ? Sudden vision changes. ? Sudden trouble speaking. ? Sudden confusion or trouble understanding simple statements. ? Sudden problems with walking or balance. ? A sudden, severe headache that is different from past headaches.  
 Call your doctor now or seek immediate medical care if: 
  · You have new or worse nausea and vomiting.  
  · You have a new or higher fever.  
  · Your headache gets much worse.  
 Watch closely for changes in your health, and be sure to contact your doctor if: 
  · You are not getting better after 2 days (48 hours). Where can you learn more? Go to http://manuel-rody.info/. Enter W712 in the search box to learn more about \"Migraine Headache: Care Instructions. \" Current as of: March 28, 2019 Content Version: 12.2 © 2773-0414 [employer]. Care instructions adapted under license by [employer] (which disclaims liability or warranty for this information). If you have questions about a medical condition or this instruction, always ask your healthcare professional. [place] [address] any warranty or liability for your use of this information. Vertigo: Care Instructions Your Care Instructions Vertigo is the feeling that you or your surroundings are moving when there is no actual movement. It is often described as a feeling of spinning, whirling, falling, or tilting.  Vertigo may make you vomit or feel nauseated. You may have trouble standing or walking and may lose your balance. Vertigo is often related to an inner ear problem, but it can have other more serious causes. If vertigo continues, you may need more tests to find its cause. Follow-up care is a key part of your treatment and safety. Be sure to make and go to all appointments, and call your doctor if you are having problems. It's also a good idea to know your test results and keep a list of the medicines you take. How can you care for yourself at home? · Do not lie flat on your back. Prop yourself up slightly. This may reduce the spinning feeling. Keep your eyes open. · Move slowly so that you do not fall. · If your doctor recommends medicine, take it exactly as directed. · Do not drive while you are having vertigo. Certain exercises, called Wright-Daroff exercises, can help decrease vertigo. To do Wright-Daroff exercises: · Sit on the edge of a bed or sofa and quickly lie down on the side that causes the worst vertigo. Lie on your side with your ear down. · Stay in this position for at least 30 seconds or until the vertigo goes away. · Sit up. If this causes vertigo, wait for it to stop. · Repeat the procedure on the other side. · Repeat this 10 times. Do these exercises 2 times a day until the vertigo is gone. When should you call for help? Call 911 anytime you think you may need emergency care. For example, call if: 
  · You passed out (lost consciousness).  
  · You have symptoms of a stroke. These may include: 
? Sudden numbness, tingling, weakness, or loss of movement in your face, arm, or leg, especially on only one side of your body. ? Sudden vision changes. ? Sudden trouble speaking. ? Sudden confusion or trouble understanding simple statements. ? Sudden problems with walking or balance. ? A sudden, severe headache that is different from past headaches.  
 Call your doctor now or seek immediate medical care if:   · Vertigo occurs with a fever, a headache, or ringing in your ears.  
  · You have new or increased nausea and vomiting.  
 Watch closely for changes in your health, and be sure to contact your doctor if: 
  · Vertigo gets worse or happens more often.  
  · Vertigo has not gotten better after 2 weeks. Where can you learn more? Go to http://manuel-rody.info/. Enter N073 in the search box to learn more about \"Vertigo: Care Instructions. \" Current as of: October 21, 2018 Content Version: 12.2 © 9381-6272 [employer]. Care instructions adapted under license by [employer] (which disclaims liability or warranty for this information). If you have questions about a medical condition or this instruction, always ask your healthcare professional. [address] any warranty or liability for your use of this information. Neck: Exercises Introduction Here are some examples of exercises for you to try. The exercises may be suggested for a condition or for rehabilitation. Start each exercise slowly. Ease off the exercises if you start to have pain. You will be told when to start these exercises and which ones will work best for you. How to do the exercises Neck stretch 1. This stretch works best if you keep your shoulder down as you lean away from it. To help you remember to do this, start by relaxing your shoulders and lightly holding on to your thighs or your chair. 2. Tilt your head toward your shoulder and hold for 15 to 30 seconds. Let the weight of your head stretch your muscles. 3. If you would like a little added stretch, use your hand to gently and steadily pull your head toward your shoulder. For example, keeping your right shoulder down, lean your head to the left. 4. Repeat 2 to 4 times toward each shoulder. Diagonal neck stretch 1.  Turn your head slightly toward the direction you will be stretching, and tilt your head diagonally toward your chest and hold for 15 to 30 seconds. 2. If you would like a little added stretch, use your hand to gently and steadily pull your head forward on the diagonal. 
3. Repeat 2 to 4 times toward each side. Dorsal glide stretch 1. Sit or stand tall and look straight ahead. 2. Slowly tuck your chin as you glide your head backward over your body 3. Hold for a count of 6, and then relax for up to 10 seconds. 4. Repeat 8 to 12 times. Chest and shoulder stretch 1. Sit or stand tall and glide your head backward as in the dorsal glide stretch. 2. Raise both arms so that your hands are next to your ears. 3. Take a deep breath, and as you breathe out, lower your elbows down and behind your back. You will feel your shoulder blades slide down and together, and at the same time you will feel a stretch across your chest and the front of your shoulders. 4. Hold for about 6 seconds, and then relax for up to 10 seconds. 5. Repeat 8 to 12 times. Strengthening: Hands on head 1. Move your head backward, forward, and side to side against gentle pressure from your hands, holding each position for about 6 seconds. 2. Repeat 8 to 12 times. Follow-up care is a key part of your treatment and safety. Be sure to make and go to all appointments, and call your doctor if you are having problems. It's also a good idea to know your test results and keep a list of the medicines you take. Where can you learn more? Go to http://manuel-rody.info/. Enter P975 in the search box to learn more about \"Neck: Exercises. \" Current as of: June 26, 2019 Content Version: 12.2 © 8518-4886 [employer]. Care instructions adapted under license by [employer] (which disclaims liability or warranty for this information).  If you have questions about a medical condition or this instruction, always ask your healthcare professional. [employer] disclaims any warranty or liability for your use of this information. Shoulder Blade: Exercises Introduction Here are some examples of exercises for you to try. The exercises may be suggested for a condition or for rehabilitation. Start each exercise slowly. Ease off the exercises if you start to have pain. You will be told when to start these exercises and which ones will work best for you. How to do the exercises Shoulder roll 1. Stand tall with your chin slightly tucked. Imagine that a string at the top of your head is pulling you straight up. 2. Keep your arms relaxed. All motion will be in your shoulders. 3. Shrug your shoulders up toward your ears, then up and back. [place] your shoulders down and back, like you're sliding your hands down into your back pants pockets. 4. Repeat the circles at least 2 to 4 times. 5. This exercise is also helpful anytime you want to relax. Lower neck and upper back stretch 1. With your arms about shoulder height, clasp your hands in front of you. 2. Drop your chin toward your chest. 
3. Reach straight forward so you are rounding your upper back. Think about pulling your shoulder blades apart. [place] feel a stretch across your upper back and shoulders. Hold for at least 6 seconds. 4. Repeat 2 to 4 times. Triceps stretch 1. Reach your arm straight up. 2. Keeping your elbow in place, bend your arm and reach your hand down behind your back. 3. With your other hand, apply gentle pressure to the bent elbow. [place] feel a stretch at the back of your upper arm and shoulder. Hold about 6 seconds. 4. Repeat 2 to 4 times with each arm. Shoulder stretch 1. Relax your shoulders. 2. Raise one arm to shoulder height, and reach it across your chest. 
3. Pull the arm slightly toward you with your other arm. This will help you get a gentle stretch. Hold for about 6 seconds. 4. Repeat 2 to 4 times. Shoulder blade squeeze 1. Sit or stand up tall with your arms at your sides. 2. Keep your shoulders relaxed and down, not shrugged. 3. Squeeze your shoulder blades together. Hold for 6 seconds, then relax. 4. Repeat 8 to 12 times. Straight-arm shoulder blade squeeze 1. Sit or stand tall. Relax your shoulders. 2. With palms down, hold your elastic tubing or band straight out in front of you. 3. Start with slight tension in the tubing or band, with your hands about shoulder-width apart. 4. Slowly pull straight out to the sides, squeezing your shoulder blades together. Keep your arms straight and at shoulder height. Slowly release. 5. Repeat 8 to 12 times. Rowing 1. Myton your elastic tubing or band at about waist height. Take one end in each hand. 2. Sit or stand with your feet hip-width apart. 3. Hold your arms straight in front of you. Adjust your distance to create slight tension in the tubing or band. 4. Slightly tuck your chin. Relax your shoulders. 5. Without shrugging your shoulders, pull straight back. Your elbows will pass alongside your waist. 
 
Pull-downs 1. Myton your elastic tubing or band in the top of a closed door. Take one end in each hand. 2. Either sit or stand, depending on what is more comfortable. If you feel unsteady, sit on a chair. 3. Start with your arms up and comfortably apart, elbows straight. There should be a slight tension in the tubing or band. 4. Slightly tuck your chin, and look straight ahead. 5. Keeping your back straight, slowly pull down and back, bending your elbows. 6. Stop where your hands are level with your chin, in a \"goalpost\" position. 7. Repeat 8 to 12 times. Chest T stretch 1. Lie on your back. Raise your knees so they are bent. Plant your feet on the floor, hip-width apart. 2. Tuck your chin, and relax your shoulders. 3. Reach your arms straight out to the sides.  If you don't feel a mild stretch in your shoulders and across your chest, use a foam roll or a tightly rolled blanket under your spine, from your tailbone to your head. 4. Relax in this position for at least 15 to 30 seconds while you breathe normally. Repeat 2 to 4 times. 5. As you get used to this stretch, keep adding a little more time until you are able relax in this position for 2 or 3 minutes. When you can relax for at least 2 minutes, you only need to do the exercise 1 time per session. Chest goalpost stretch 1. Lie on your back. Raise your knees so they are bent. Plant your feet on the floor, hip-width apart. 2. Tuck your chin, and relax your shoulders. 3. Reach your arms straight out to the sides. 4. Bend your arms at the elbows, with your hands pointed toward the top of your head. Your arms should make an L on either side of your head. Your palms should be facing up. 5. If you don't feel a mild stretch in your shoulders and across your chest, use a foam roll or tightly rolled blanket under your spine, from your tailbone to your head. 6. Relax in this position for at least 15 to 30 seconds while you breathe normally. Repeat 2 to 4 times. 7. Each day you do this exercise, add a little more time until you can relax in this position for 2 or 3 minutes. When you can relax for at least 2 minutes, you only need to do the exercise 1 time per session. Follow-up care is a key part of your treatment and safety. Be sure to make and go to all appointments, and call your doctor if you are having problems. It's also a good idea to know your test results and keep a list of the medicines you take. Where can you learn more? Go to http://manuel-rody.info/. Enter [phone] in the search box to learn more about \"Shoulder Blade: Exercises. \" Current as of: June 26, 2019 Content Version: 12.2 © 7296-0849 [employer].  Care instructions adapted under license by [address] (which disclaims liability or warranty for this information). If you have questions about a medical condition or this instruction, always ask your healthcare professional. [address] any warranty or liability for your use of this information.

## 2019-11-22 NOTE — TELEPHONE ENCOUNTER
Jude Phillips  Female, 38 y.o., 1975  Weight:   149 lb (67.6 kg)  MRN:   [id number]  Phone:   [phone] (M)  PCP:   Leodan Franco MD  Primary Cvg:   [facility] - [address] With Me  None  Next Appt With Me  None  Next Appt  11/22/19 - Rosita Sy MD - [facility] [phone]  Non-Urgent Medical Question     From  Yolanda Florentino To  [facility] Nurse Pool Sent  11/22/2019  8:26 AM   Not sure if you can do this or if I have to see a hematologist but need to get my blood tested Dr. Rebeca Alfonso nurse just called and told me. Said the 81mg aspirin may not be working and a clot could have my hearing impaired.     Encounter Messages     Read Composed From To Subject   Y 11/22/2019  8:26 AM Mirza Ribera MD Non-Urgent Medical Question

## 2019-11-22 NOTE — PROGRESS NOTES
NEUROLOGY CLINIC NOTE    Patient ID:  Destiney Espinoza  [id number]  71 y.o.  1975    Date of Visit:  2019    Referring Physician: Dr. Layo Wilson    Reason for Visit:  Chronic headache, dizziness/vertigo, wobbliness    Chief Complaint   Patient presents with    Follow-up       History of Present Illness:     Patient Active Problem List    Diagnosis Date Noted    Chronic migraine 2019    Vertigo 2019    Pain in joint of right foot 10/01/2019    Peripheral vascular disease ([facility] [address] 2019    Ventral hernia 2019    Anxiety     Intractable migraine with aura with status migrainosus 2016    Subacute maxillary sinusitis 2016     Past Medical History:   Diagnosis Date    Anxiety     Asthma     Depression     Factor V deficiency ([facility] [address]     Heart rate fast     Long term current use of anticoagulant therapy     Migraine     Thromboembolus ([facility] [address] 2006    DVT left groin      Past Surgical History:   Procedure Laterality Date    ABDOMEN SURGERY PROC UNLISTED      laproscopy removed adhesions on colon    DILATION AND CURETTAGE      HX APPENDECTOMY      HX  SECTION      [address]  2019     Milagros Chapman incarcerated ventral hernia repair    HX HYSTERECTOMY  2018    HX ORTHOPAEDIC  2009    bone removed from foot    HX TUBAL LIGATION  c sec      Prior to Admission medications    Medication Sig Start Date End Date Taking? Authorizing Provider   rizatriptan (MAXALT-MLT) 10 mg disintegrating tablet Take 1 at onset of severe headache; may repeat another dose in 2 hours if headaches persist 19  Yes Anya Dean MD   ALPRAZolam Ruth Hutchins) 0.5 mg tablet TAKE 1 TABLET BY MOUTH TWICE A DAY AS NEEDED FOR ANXIETY. MAX DAILY AMOUNT: 2 TABLETS.  19  Yes Libertad Allen MD   onabotulinumtoxinA (BOTOX) 200 unit injection Inject 200 units by IM route to 31 FDA approved sites in head and neck every 12 weeks 19  Yes Milton Carpenter MD   nortriptyline (PAMELOR) 10 mg capsule Take 1 daily x 1 wk; then 2 daily x 1 wk; then 3 daily 11/4/19  Yes Milton Carpenter MD   ondansetron (ZOFRAN ODT) 4 mg disintegrating tablet Take 1 Tab by mouth every eight (8) hours as needed for Nausea. 10/25/19  Yes Veronica Ruiz MD   ascorbic acid, vitamin C, (VITAMIN C) 100 mg tab Take 1 Tab by mouth two (2) times a day. 10/1/19  Yes Geoffrey Holman DO   sertraline (ZOLOFT) 100 mg tablet TAKE 1 TABLET BY MOUTH ONCE DAILY 8/19/19  Yes Veronica Ruiz MD   omeprazole (PRILOSEC) 20 mg capsule Take 20 mg by mouth daily. Yes Provider, Historical   cetirizine-pseudoePHEDrine (ZYRTEC-D) 5-120 mg Tb12 Take 1 Tab by mouth two (2) times a day. Yes Provider, Historical   Magnesium Oxide 500 mg cap Take  by mouth. Yes Provider, Historical   acetaminophen (TYLENOL EXTRA STRENGTH) 500 mg tablet Take 1,000 mg by mouth. Yes Provider, Historical   albuterol-ipratropium (DUO-NEB) 2.5 mg-0.5 mg/3 ml nebu USE 1 VIAL VIA NEBULIZER EVERY 6 HOURS AS NEEDED 12/19/18  Yes Veronica Ruiz MD   albuterol (PROVENTIL HFA) 90 mcg/actuation inhaler Take 2 Puffs by inhalation every four (4) hours as needed for Wheezing. 12/4/17  Yes Veronica Ruiz MD   aspirin 81 mg chewable tablet Take 81 mg by mouth daily. Yes Other, MD Phani   meclizine (ANTIVERT) 25 mg tablet Take 2 Tabs by mouth three (3) times daily as needed for Dizziness for up to 10 days. 11/15/19 11/25/19  Veronica Ruiz MD   meclizine (ANTIVERT) 25 mg tablet TAKE 2 TABS BY MOUTH THREE (3) TIMES DAILY AS NEEDED FOR DIZZINESS FOR UP TO 10 DAYS. 11/6/19   Provider, Historical   dihydroergotamine (MIGRANAL) 0.5 mg/pump act. (4 mg/mL) nasal spray 1 spray in one nostril as directed.   No more than 4 sprays in one hour 11/8/19   Milton Carpenter MD   LORazepam (ATIVAN) 2 mg tablet Take 1 tab 30 min to 1 hour prior to the MRI and may repeat another dose if necessary 11/4/19   Kel Elliott MD   promethazine-dextromethorphan (PROMETHAZINE-DM) 6.25-15 mg/5 mL syrup TAKE 5 ML BY MOUTH EVERY FOUR (4) HOURS AS NEEDED FOR COUGH FOR UP TO 7 DAYS. 9/24/19   Provider, Historical   diclofenac EC (VOLTAREN) 75 mg EC tablet Take 1 Tab by mouth two (2) times daily (with meals). 8/19/19   Nakul Domingo MD   ferrous sulfate (IRON) 325 mg (65 mg iron) tablet Take  by mouth Daily (before breakfast). Provider, Historical   potassium 99 mg tablet Take 99 mg by mouth daily. Provider, Historical   zinc 50 mg tab tablet Take  by mouth daily. Provider, Historical   cyanocobalamin (VITAMIN B12) 500 mcg tablet Take 500 mcg by mouth daily. Provider, Historical   baclofen (LIORESAL) 10 mg tablet Take 1 tab at bedtime x 1 wk; then 1 tab BID x 1 wk; then 1 tab TID 7/30/19   Kel Elliott MD   butalbital-acetaminophen-caffeine (FIORICET, ESGIC) -40 mg per tablet TAKE 1 TABLET BY MOUTH EVERY 6 HOURS AS NEEDED FOR PAIN 7/8/19   Nakul Domingo MD   chlorpheniramine-HYDROcodone (TUSSIONEX) 10-8 mg/5 mL suspension Take 5 mL by mouth every twelve (12) hours as needed for Cough. Max Daily Amount: 10 mL. Patient not taking: Reported on 9/19/2019 12/4/17   Nakul Domingo MD     Allergies   Allergen Reactions    Adhesive Tape-Silicones Other (comments)     Pt states this burns her skin. Social History     Tobacco Use    Smoking status: Former Smoker     Packs/day: 0.50     Years: 21.00     Pack years: 10.50     Types: Cigarettes     Last attempt to quit: 3/5/2016     Years since quitting: 3.7    Smokeless tobacco: Never Used   Substance Use Topics    Alcohol use:  Yes     Alcohol/week: 0.0 standard drinks     Comment: Occ      Family History   Problem Relation Age of Onset    Diabetes Mother     Alzheimer Mother     Hypertension Mother     High Cholesterol Mother     Headache Mother     Heart Disease Mother     Cancer Father  Headache Father     Heart Disease Father     Diabetes Sister     No Known Problems Brother     Immunodeficiency Sister     No Known Problems Sister     Cancer Maternal Uncle     Cancer Maternal Grandfather         Subjective:      Aspen Duarte is a 38 y.o. VSZM wit history of headaches, anxiety, depression, asthma, factor V deficiency and left groin DVT who was referred here by Chiquita Peters for further evaluation of her headaches. Per patient condition started around 18 years PTC. Worse because it is more intense or frequent. S/P hysterectomy last year. Wake up with the headache or may wake her up. Right temporo-parietal radiates the neck, scapula or shoulder. Sharp and like a knife sticking. 1/10 to a at its worst a 10/10. Associated with blurring of vision, light and noise sensitivity, nausea and vomiting. Lasts 4 hours to as long as 4 to 5 days. Occurs 4 to 5 times a week. >20 headaches per month. Last severe headache was yesterday. Takes Fioricet and Zofran which dulls it down but not take it away  [facility]    She does not take any daily maintenance medication for the headaches. Previously saw a neurologist around 17 years ago and had occipital nerve block done without relief. Does desk and computer work. Review of records here revealed a head CT without contrast done 11/19/2012 for migraine headaches revealed no acute abnormality. Cervical spine x-ray done 3/1/2011 was normal.    CBC, CMP done by PCP 5/13/2019 was unremarkable. Patient was last seen by her PCP 7/1/2019 for abdominal pain and headache. Plan for migraine was to see us. Patient prescribed trazodone 50 mg at bedtime for insomnia.     Medications previously tried for headaches: Ibuprofen, Advil, Aleve, aspirin, Tylenol, Excedrin, Tylenol 3, Toradol, Fioricet, Imitrex, Darvocet, Vicodin, Percocet, oxycodone, Zofran, Lopressor, Depakote, amitriptyline, Flexeril, Flonase, Prilosec, Cymbalta, Skelaxin, Lexapro, Effexor, magnesium, Celebrex, prednisone, Xanax, Benadryl, lidocaine, melatonin, Paxil, Wellbutrin, Lyrica, antihistamine, Relpax, Ajovy, Naratriptan, Baclofen    Review of records reveal patient was seen by her PCP 10/17/2019 for 3-day history of right throbbing headache with severe vertigo. Associated with fatigue and nausea. Patient was given ketorolac and promethazine injection. Patient was also prescribed meclizine. This provided some temporary relief. Patient again called her PCP office 10/21/2019 for another bout of severe headache and vertigo. Advised to go to the ER. [facility] in [employer] [place] [employer] where head CT without contrast was done and per patient was unremarkable. Treated with cocktail of medication which offered again temporary relief. She continues to complain of her right-sided headache currently an 8/10 associated with episodes of dizziness and vertigo. It is not positional.  It occurs when at rest or ambulating. Associate with wobbliness and nausea. Limits her ability to drive currently. She currently takes meclizine 50 mg every 8 hours which does not offer sustained benefit. Naratriptan does not abort her headaches. She apparently stopped taking baclofen. She again has issues with neck and shoulder stiffness. Still having >20 headaches per month. She is on Ajovy 225 mg subcutaneous injection every month x 3 doses. Denies any side effects. No clear benefit. She also takes as needed Fioricet for abortive therapy. Since the last visit on 11/04/2019, patient underwent occipital nerve block which offered temporary relief for several hours and then pain recurred. She also underwent a brain MRI without contrast 11/5/2019 which revealed normal appearance of bilateral auditory apparatus. No significant intracranial abnormality. Borderline low-lying cerebellar tonsils.   Patient reports persistence of her headaches and vertigo. No sustained benefit with current medications. Patient is currently taking nortriptyline 30 mg daily. Denies any side effects but no benefit. Migranal nasal spray did offer benefit but insurance will not cover for it. Patient was prescribed rizatriptan which offers no benefit as abortive therapy. Fioricet offer no significant benefit She continues to complain of a constant headache with severe daily exacerbation that waxes and wanes. She continues to have episodes of dizziness and vertigo which limits her ability to drive. She did see ENT and is currently undergoing work-up. No sustained benefit with Meclizine. She has not been able to work. Ajovy was discontinued due to lack of benefit. Authorization for Botox 155 units using the chronic migraine protocol was obtained. Awaiting pharmacy approval and shipment. Currently complaining of 8/10 headache. Outside reports reviewed: radiology reports    Review of Systems:    A comprehensive review of systems was performed:   Positive for poor appetite, fatigue, blurry vision, nausea, vomiting, headaches, dizziness/vertigo, balance issues, slurred speech, easy bruising    Objective:     Visit Vitals  /74   Pulse (!) 116   Ht 5' 1\" (1.549 m)   Wt 145 lb (65.8 kg)   LMP 06/10/2017   SpO2 99%   BMI 27.40 kg/m²     8/10 headache    PHYSICAL EXAM:    NEUROLOGICAL EXAM:    Appearance: The patient is well developed, well nourished, provides a coherent history and is in no acute distress. Mental Status: Oriented to time, place and person. Fluent, no aphasia or dysarthria. Mood and affect appropriate. Cranial Nerves:   II - XII were intact. Motor:  5/5 strength. Normal bulk and tone. No pronator drift. Reflexes:   Deep tendon reflexes were symmetrical.    Sensory:   Normal to cold, pinprick and vibration. Gait:  Slight unsteady. (+) Romberg   Tremor:   No tremor noted. Cerebellar:  Intact FTN/LA/HTS.      Severe anterior head posture (3-4 FB off shoulder) with shoulders rotated in; increase right SCM  (+) tenderness on palpation right occiput    Imaging  Brain MRI: reviewed      Assessment:   Chronic intractable migraine without aura -worsening  Right-sided occipital neuralgia - worsening  Cervicogenic headaches - worsening  Vertigo - persistent    Plan:   Neurological examination reveals is unremarkable except for some gait instability. Brain MRI without contrast was unremarkable. Worse headaches consistent with migraines currently intractable. Continue Nortriptyline 30 mg daily. Discontinue Ayovy 225 mg subcutaneous injection every month. Given the patient also has greater than 20 headaches per month, trial of chemodenervation with Botox 155 units using the chronic migraine protocol will be done. Awaiting shipment of medication. Trial of Medrol dosepak to break current cycle of headache. Prescription was provided. Advised to refrain from things that could trigger a headache (dairy, chocolate, wine and etc.). Advised to keep a headache log. Examined history also reveals significant elements of right occipital neuralgia triggering her migraine headaches due to poor anterior head posture. No sustained benefit with occipital nerve block. Encouraged to continue to correct head posture. Trial of Tramadol 50 mg every 6 hours as needed for severe pain. Prescription was provided. Exam also reveals elements of cervicogenic headaches due to poor anterior head posture. Previous range of motion measurements revealed mild restriction head extension and right lateral head flexion. Encouraged to continue to correct her anterior head posture. Use headrest at work, at home and while driving. Use wireless headsets. Do not carry anything the shoulder. Use at most one pillow when sleeping. Continue neck and shoulder exercises. Patient was referred to physical therapy for more aggressive manipulation including dry needling.      Brain MRI did not reveal any brainstem or cerebellar pathology. IAC did not reveal any pathology. Per patient states he takes its more positional vertigo. They did notice decreased hearing loss in the right ear. Undergoing work-up including balance testing. All questions and concerns were answered. This note was created using voice recognition software. Despite editing, there may be syntax errors.

## 2019-11-22 NOTE — LETTER
11/25/19 Patient: John Alvarado YOB: 1975 Date of Visit: 11/22/2019 Meme Rodriguez MD 
[address] [place] [id number] VIA In Basket Dear Meme Rodriguez MD, Thank you for referring Ms. Maricarmen Blanco to [address] [place] [address] for evaluation. My notes for this consultation are attached. If you have questions, please do not hesitate to call me. I look forward to following your patient along with you. Sincerely, Olinda Silva MD

## 2019-12-03 ENCOUNTER — DOCUMENTATION ONLY (OUTPATIENT)
Dept: NEUROLOGY | Age: 44
End: 2019-12-03

## 2019-12-03 NOTE — PROGRESS NOTES
Faxed prescription to [facility]. Spoke with Huang Craig and she said she would expedite it and could get it here by Friday and that she would email us with the information.

## 2019-12-05 ENCOUNTER — TELEPHONE (OUTPATIENT)
Dept: FAMILY MEDICINE CLINIC | Age: 44
End: 2019-12-05

## 2019-12-05 NOTE — TELEPHONE ENCOUNTER
Jude Phillips  Female, 38 y.o., 1975  Weight:   145 lb (65.8 kg)  MRN:   [id number]  Phone:   [phone] All Lopez)  PCP:   Nakul Domingo MD  Primary Cvg:   [facility]  Last Appt With Me  None  Next Appt With Me  None  Next Appt  12/6/19 - Kel Elliott MD - [facility] [phone]  Non-Urgent Medical Question     From  [facility] [place] To  [facility] Nurse Pool Sent  12/5/2019 11:51 AM   I went to therapy today for dizziness. Found out I have crystals stuck causing problems and my balance is off. Therapist said it looks like I will be in therapy at least to the beginning of the year. I still can not even turn my head without getting dizzy so no driving. Which means no work until after the new year. Do I get the doctor note from you or from Dr. Katlyn Whitehead?     Attachments     [id number]   Encounter Messages     Read Composed From To Subject   Y 12/5/2019 11:51 AM Meg Last MD Non-Urgent Medical Question

## 2019-12-06 ENCOUNTER — TELEPHONE (OUTPATIENT)
Dept: NEUROLOGY | Age: 44
End: 2019-12-06

## 2019-12-06 RX ORDER — KETOROLAC TROMETHAMINE 10 MG/1
10 TABLET, FILM COATED ORAL EVERY 6 HOURS
Qty: 20 TAB | Refills: 0 | Status: SHIPPED | OUTPATIENT
Start: 2019-12-06 | End: 2019-12-11

## 2019-12-06 NOTE — TELEPHONE ENCOUNTER
Spoke with patient to cancel appt for today as her botox didn't come in. Patient stated her head was hurting really bad and she was out of medications. Please advise. Patient stated she has taken all of her tramadol.

## 2019-12-08 DIAGNOSIS — G43.909 MIGRAINE SYNDROME: ICD-10-CM

## 2019-12-08 RX ORDER — SERTRALINE HYDROCHLORIDE 100 MG/1
TABLET, FILM COATED ORAL
Qty: 30 TAB | Refills: 3 | Status: SHIPPED | OUTPATIENT
Start: 2019-12-08 | End: 2020-02-12 | Stop reason: SDUPTHER

## 2019-12-09 ENCOUNTER — TELEPHONE (OUTPATIENT)
Dept: FAMILY MEDICINE CLINIC | Age: 44
End: 2019-12-09

## 2019-12-09 NOTE — TELEPHONE ENCOUNTER
Jude Phillips  Female, 38 y.o., 1975  Weight:   145 lb (65.8 kg)  MRN:   [id number]  Phone:   [phone] (M)  PCP:   Navi Hansen MD  Primary Cvg:   [facility]ANI [facility] - [facility]  Last Appt With Me  None  Next Appt With Me  None  Next Appt  None  Non-Urgent Medical Question     From  Katerina Sibley To  [facility] Nurse Pool Sent  12/9/2019 12:09 PM   Dr. Piper Woodard said he is putting in his notes to you today that he recommends me having my blood tested and probably put on a blood thinner again. He feels like it may have been a tiny clot.     Encounter Messages     Read Composed From To Subject   Y 12/9/2019 12:09 PM Lukas Goodrich MD Non-Urgent Medical Question

## 2019-12-10 ENCOUNTER — OFFICE VISIT (OUTPATIENT)
Dept: FAMILY MEDICINE CLINIC | Age: 44
End: 2019-12-10

## 2019-12-10 VITALS
TEMPERATURE: 98.6 F | DIASTOLIC BLOOD PRESSURE: 66 MMHG | HEART RATE: 105 BPM | WEIGHT: 145 LBS | BODY MASS INDEX: 27.38 KG/M2 | RESPIRATION RATE: 18 BRPM | HEIGHT: 61 IN | OXYGEN SATURATION: 98 % | SYSTOLIC BLOOD PRESSURE: 106 MMHG

## 2019-12-10 DIAGNOSIS — D68.59 HYPERCOAGULABLE STATE (HCC): ICD-10-CM

## 2019-12-10 DIAGNOSIS — G43.109 MIGRAINE WITH VERTIGO: Primary | ICD-10-CM

## 2019-12-10 DIAGNOSIS — J21.9 ACUTE BRONCHIOLITIS DUE TO UNSPECIFIED ORGANISM: ICD-10-CM

## 2019-12-10 DIAGNOSIS — R42 VERTIGO: ICD-10-CM

## 2019-12-10 RX ORDER — AMOXICILLIN 500 MG/1
500 CAPSULE ORAL 3 TIMES DAILY
Qty: 21 CAP | Refills: 0 | Status: SHIPPED | OUTPATIENT
Start: 2019-12-10 | End: 2019-12-17

## 2019-12-10 RX ORDER — PREDNISONE 10 MG/1
10 TABLET ORAL
Qty: 7 TAB | Refills: 0 | Status: SHIPPED | OUTPATIENT
Start: 2019-12-10 | End: 2019-12-17

## 2019-12-10 NOTE — PROGRESS NOTES
HISTORY OF PRESENT ILLNESS  Yolanda Florentino is a 38 y.o. female. f/u vertigo felt to be related to hypercoag state by ENT. Undergoing PT with slow improvement. Has been diagnosed Factor 5 ,Leiden deficiency. Has hx of LLExt DVT and anticoagulation with Coumadin for months,pt stopped. Feeling ok    Dizziness    The history is provided by the patient. This is a recurrent problem. The current episode started more than 1 week ago. The problem occurs daily. The problem has been gradually improving. Associated symptoms include headaches and dizziness. Pertinent negatives include no chest pain, no fever, no abdominal pain and no back pain. Cough   The history is provided by the patient. This is a chronic problem. The problem occurs daily. The problem has not changed since onset. Associated symptoms include headaches. Pertinent negatives include no chest pain and no abdominal pain. She has tried nothing for the symptoms. Review of Systems   Constitutional: Negative for fever and weight loss. Respiratory: Positive for cough. Cardiovascular: Negative for chest pain. Gastrointestinal: Negative for abdominal pain. Genitourinary: Negative for frequency. Musculoskeletal: Negative for back pain. Skin: Negative for rash. Neurological: Positive for dizziness and headaches. Physical Exam  Constitutional:       Appearance: Normal appearance. HENT:      Head: Normocephalic and atraumatic. Nose: Nose normal.      Mouth/Throat:      Mouth: Mucous membranes are moist.   Neck:      Musculoskeletal: Normal range of motion and neck supple. Cardiovascular:      Rate and Rhythm: Normal rate and regular rhythm. Pulses: Normal pulses. Pulmonary:      Effort: Pulmonary effort is normal.      Breath sounds: Normal breath sounds. Neurological:      General: No focal deficit present. Mental Status: She is alert and oriented to person, place, and time. Mental status is at baseline. Cranial Nerves:  No cranial nerve deficit. ASSESSMENT and PLAN  Diagnoses and all orders for this visit:    1. Migraine with vertigo    2. Vertigo,improving,to continue PT    3. Hypercoagulable state (HCC),after discussion of risks and benefits will start [id number] mg     4. Acute bronchiolitis due to unspecified organism  -     predniSONE (DELTASONE) 10 mg tablet; Take 10 mg by mouth daily (with breakfast) for 7 days. -     amoxicillin (AMOXIL) 500 mg capsule; Take 1 Cap by mouth three (3) times daily for 7 days. Other orders  -     rivaroxaban (XARELTO) 10 mg tablet; Take 1.5 Tabs by mouth daily (with breakfast). Follow-up and Dispositions    · Return in about 4 weeks (around 1/7/2020).

## 2019-12-10 NOTE — PROGRESS NOTES
Chief Complaint   Patient presents with    Cough     Pt has cough.  Medication Evaluation     Pt reqesting PT/INR. 1. Have you been to the ER, urgent care clinic since your last visit? Hospitalized since your last visit? No    2. Have you seen or consulted any other health care providers outside of the [address] [place] [address] since your last visit? Include any pap smears or colon screening.  No

## 2019-12-15 PROBLEM — D68.59 HYPERCOAGULABLE STATE (HCC): Status: ACTIVE | Noted: 2019-12-15

## 2019-12-17 ENCOUNTER — TELEPHONE (OUTPATIENT)
Dept: FAMILY MEDICINE CLINIC | Age: 44
End: 2019-12-17

## 2019-12-17 RX ORDER — PROMETHAZINE HYDROCHLORIDE AND DEXTROMETHORPHAN HYDROBROMIDE 6.25; 15 MG/5ML; MG/5ML
SYRUP ORAL
Qty: 180 ML | Refills: 1 | Status: SHIPPED | OUTPATIENT
Start: 2019-12-17 | End: 2020-01-30

## 2019-12-17 NOTE — TELEPHONE ENCOUNTER
Jude Phillips  Female, 38 y.o., 1975  Weight:   145 lb (65.8 kg)  MRN:   [id number]  Phone:   [phone] (M)  PCP:   Bailey Andres MD  Primary Cvg:   [facility]  Last Appt With Me  None  Next Appt With Me  None  Next Appt  1/15/20 - Bailey Andres MD - [address]  Non-Urgent Medical Question     From  Chaya Osler To  [facility] Nurse Pool Sent  12/17/2019 12:06 PM   I went to therapy today for my ears, balance. They are sending Dr. Jackelin Torres a request for some kind of test to check the middle ear. (Blowing hot and cold into ear canal). Balance still way off and still no driving until we figure out what is going on. They are supposed to send you over a copy of what they are sending Dr. Jackelin Torres. I will need to continue to be out of work until after we get the test results back. Right now you have me out until January 2.     Encounter Messages     Read Composed From To Subject   Y 12/17/2019 12:06 PM Joanna Luther MD Non-Urgent Medical Question

## 2019-12-19 ENCOUNTER — TELEPHONE (OUTPATIENT)
Dept: NEUROLOGY | Age: 44
End: 2019-12-19

## 2019-12-19 NOTE — TELEPHONE ENCOUNTER
----- Message from Lorna Chan sent at 12/19/2019 11:26 AM EST -----  Regarding: Dr. Greg Crowley first and last name:Pt      Reason for call: Requesting a call back concerning Botox       Callback required yes/no and why:Yes      Best contact number(s):[phone]      Details to clarify the request:      Lorna Chan

## 2019-12-20 DIAGNOSIS — J45.901 ASTHMATIC BRONCHITIS WITH ACUTE EXACERBATION, UNSPECIFIED ASTHMA SEVERITY, UNSPECIFIED WHETHER PERSISTENT: ICD-10-CM

## 2019-12-20 NOTE — TELEPHONE ENCOUNTER
Jude Phillips  Female, 38 y.o., 1975  Weight:   145 lb (65.8 kg)  MRN:   [id number]  Phone:   [phone] (M)  PCP:   Geneva Reveles MD  Primary Cvg:   [facility]  Last Appt With Me  None  Next Appt With Me  None  Next Appt  1/15/20 - Geneva Reveles MD - [address]  Prescription Question     From  [facility] To  [facility] Nurse Pool Sent  12/20/2019 10:12 AM   The cough medicine promethazine is on back order. They have more idea when they will get it. Is there any way of getting the other one? The thick yellow one? I can not remember the name. Cough getting worse. Thank you.     Encounter Messages     Read Composed From To Subject   Y 12/20/2019 10:12 AM Cathi Terrell MD Prescription Question

## 2019-12-20 NOTE — TELEPHONE ENCOUNTER
Patient states that promethazine dm is on backorder, patient is requesting Tussionex, she states that her cough is getting worse.

## 2019-12-20 NOTE — TELEPHONE ENCOUNTER
Spoke with Luli Owen and Informed her that I would call Pipe Torrez and find out where her botox was.

## 2019-12-22 RX ORDER — HYDROCODONE POLISTIREX AND CHLORPHENIRAMINE POLISTIREX 10; 8 MG/5ML; MG/5ML
1 SUSPENSION, EXTENDED RELEASE ORAL
Qty: 115 ML | Refills: 0 | OUTPATIENT
Start: 2019-12-22

## 2019-12-23 ENCOUNTER — TELEPHONE (OUTPATIENT)
Dept: FAMILY MEDICINE CLINIC | Age: 44
End: 2019-12-23

## 2019-12-23 DIAGNOSIS — J45.901 ASTHMATIC BRONCHITIS WITH ACUTE EXACERBATION, UNSPECIFIED ASTHMA SEVERITY, UNSPECIFIED WHETHER PERSISTENT: Primary | ICD-10-CM

## 2019-12-23 RX ORDER — PREDNISONE 10 MG/1
10 TABLET ORAL 2 TIMES DAILY
Qty: 10 TAB | Refills: 0 | Status: SHIPPED | OUTPATIENT
Start: 2019-12-23 | End: 2020-01-14 | Stop reason: ALTCHOICE

## 2019-12-23 NOTE — TELEPHONE ENCOUNTER
Promethazine Dm is on backorder, Promethazine with codeine was denied.  Patient is requested another cough medication [phone]

## 2019-12-23 NOTE — TELEPHONE ENCOUNTER
Jude Phillips  Female, 38 y.o., 1975  Weight:   145 lb (65.8 kg)  MRN:   [id number]  Phone:   [phone] (M)  PCP:   Mary Solis MD  Primary Cvg:   [facility]  Last Appt With Me  None  Next Appt With Me  None  Next Appt  1/15/20 - Mary Solis MD - [address]  Prescription Question     From  Hudson Bolden To  [facility] Nurse Pool Sent  12/23/2019 10:36 AM   Can you please call me in some cough medicine. The other is on back order. I really need some now. The over the counter is not working. Thank you.     Encounter Messages     Read Composed From To Subject   Y 12/23/2019 10:36 AM Nimco Whitney MD Prescription Question

## 2019-12-24 ENCOUNTER — DOCUMENTATION ONLY (OUTPATIENT)
Dept: NEUROLOGY | Age: 44
End: 2019-12-24

## 2019-12-24 NOTE — PROGRESS NOTES
botox came in the office: 12/24/19  MFR: aquilino  LOT: [id number]  Exp: 5/22  Appointment: 12/26/19  Specialty pharmacy: [facility] 01-Dec-2018

## 2019-12-27 ENCOUNTER — DOCUMENTATION ONLY (OUTPATIENT)
Dept: NEUROLOGY | Age: 44
End: 2019-12-27

## 2019-12-27 ENCOUNTER — OFFICE VISIT (OUTPATIENT)
Dept: NEUROLOGY | Age: 44
End: 2019-12-27

## 2019-12-27 VITALS
BODY MASS INDEX: 28.51 KG/M2 | HEIGHT: 61 IN | SYSTOLIC BLOOD PRESSURE: 110 MMHG | DIASTOLIC BLOOD PRESSURE: 65 MMHG | WEIGHT: 151 LBS | OXYGEN SATURATION: 98 % | HEART RATE: 65 BPM

## 2019-12-27 DIAGNOSIS — G43.719 CHRONIC MIGRAINE WITHOUT AURA, INTRACTABLE, WITHOUT STATUS MIGRAINOSUS: Primary | ICD-10-CM

## 2019-12-27 RX ORDER — KETOROLAC TROMETHAMINE 30 MG/ML
60 INJECTION, SOLUTION INTRAMUSCULAR; INTRAVENOUS ONCE
Qty: 1 VIAL | Refills: 0
Start: 2019-12-27 | End: 2019-12-27

## 2019-12-27 NOTE — PATIENT INSTRUCTIONS
Office Policies  · Phone calls/patient messages:  Please allow up to 24 hours for someone in the office to contact you about your call or message. Be mindful your provider may be out of the office or your message may require further review. We encourage you to use [facility] for your messages as this is a faster, more efficient way to communicate with our office  · Medication Refills:  Prescription medications require up to 48 business hours to process. We encourage you to use [facility] for your refills. For controlled medications: Please allow up to 72 business hours to process. Certain medications may require you to  a written prescription at our office. NO narcotic/controlled medications will be prescribed after 4pm Monday through Friday or on weekends  · Form/Paperwork Completion:  Please note there is a $25 fee for all paperwork completed by our providers. We ask that you allow 7-14 business days. Pre-payment is due prior to picking up/faxing the completed form. You may also download your forms to [facility] to have your doctor print off.

## 2019-12-27 NOTE — PROCEDURES
Neurology [place] Note    Chart reviewed for the following:  Ailson Wright MD, have reviewed the History, Physical and updated the Allergic reactions for Morgan Melgar.     TIME OUT performed immediately prior to start of procedure:  Alison Wright MD, have performed the following reviews on [address] [facility] prior to the start of the procedure:      * Patient was identified by name and date of birth   * Agreement on procedure being performed was verified  * Risks and Benefits explained to the patient  * Procedure site verified and marked as necessary  * Patient was positioned for comfort  * Consent was signed and verified      Time: 18        Date of procedure: 12/27/2019     Procedure performed by: Jericho Naqvi MD     Provider assisted by: None     Patient assisted by: None     How tolerated by patient: tolerated the procedure well with no complications     Pre Procedural Pain Scale: 8/10  Post Procedural Pain Scale: 8/10     Comments: used topical anesthetic     Botox Injection Note         Indication: Patient has chronic recurrent migraine, has greater than 15 migraine headaches per month, has failed two or more categories of preventatives     Procedure:   Botox concentration: 200 units in 4 ml of preservative-free normal saline. 31 sites injections, distribution as follow        Units/site Sites Sides Subtotal    Procerus 5 1 1 5    5 1 2 10   Frontalis 5 2 2 20   Temporalis 5 4 2 40   Occipitalis 5 3 2 30   Upper cervical paraspinalis 5 2 2 20   Trapezius 5 3 2 30        155 units injected as above with 45 units of wastage.            Lot. No. [id number]  C3   Exp. Date 05/2022      No immediate complications were encountered. Pressure applied to all sites. Patient tolerated the procedure well and was released to home. The patient was advised of the possibility of some soreness/redness, etc. at the injection sites and to report if she develops any significant problems.  If she develops problems with swallowing or breathing patient is advised to go to the nearest emergency room. Given Ketorolac 60 mg intramuscularly for acute headache. RTC in 1 month.

## 2019-12-28 DIAGNOSIS — M54.2 CERVICALGIA: ICD-10-CM

## 2019-12-28 DIAGNOSIS — G89.29 CHRONIC NONINTRACTABLE HEADACHE, UNSPECIFIED HEADACHE TYPE: ICD-10-CM

## 2019-12-28 DIAGNOSIS — R51.9 CHRONIC NONINTRACTABLE HEADACHE, UNSPECIFIED HEADACHE TYPE: ICD-10-CM

## 2019-12-30 ENCOUNTER — TELEPHONE (OUTPATIENT)
Dept: FAMILY MEDICINE CLINIC | Age: 44
End: 2019-12-30

## 2019-12-30 DIAGNOSIS — D68.59 HYPERCOAGULABLE STATE (HCC): Primary | ICD-10-CM

## 2019-12-30 DIAGNOSIS — G89.29 CHRONIC NONINTRACTABLE HEADACHE, UNSPECIFIED HEADACHE TYPE: ICD-10-CM

## 2019-12-30 DIAGNOSIS — R51.9 CHRONIC NONINTRACTABLE HEADACHE, UNSPECIFIED HEADACHE TYPE: ICD-10-CM

## 2019-12-30 DIAGNOSIS — G43.909 MIGRAINE SYNDROME: ICD-10-CM

## 2019-12-30 DIAGNOSIS — M54.2 CERVICALGIA: ICD-10-CM

## 2019-12-30 RX ORDER — BUTALBITAL, ACETAMINOPHEN AND CAFFEINE 50; 325; 40 MG/1; MG/1; MG/1
1 TABLET ORAL
Qty: 60 TAB | Refills: 0 | Status: SHIPPED | OUTPATIENT
Start: 2019-12-30 | End: 2019-12-30 | Stop reason: SDUPTHER

## 2019-12-30 RX ORDER — BUTALBITAL, ACETAMINOPHEN AND CAFFEINE 50; 325; 40 MG/1; MG/1; MG/1
1 TABLET ORAL
Qty: 60 TAB | Refills: 0 | Status: SHIPPED | OUTPATIENT
Start: 2019-12-30 | End: 2020-01-20

## 2019-12-30 NOTE — TELEPHONE ENCOUNTER
Jude Phillips  Female, 38 y.o., 1975  Weight:   151 lb (68.5 kg)  MRN:   [id number]  Phone:   [phone] (M)  PCP:   Urszula Best MD  Primary Cvg:   [facility]  Last Appt With Me  None  Next Appt With Me  None  Next Appt  1/14/20 - Urszula Best MD - [address] [facility] [address]  Prescription Question     From  Sejal Lau To  [facility] Nurse Pool Sent  12/30/2019 10:27 AM   Good morning Dr. Sara Gonzalez. I have a question. The neurologist I was seeing left on Friday to move to different practice. Should I still ask the practice about headache medicine or should I ask you? I had botox injections on Friday and a shot of toradol but still have a migraine.  Ice packs all over my head and been taking ibuprofen to help with swelling. Thanks for any help you can give me.     Encounter Messages     Read Composed From To Subject   Y 12/30/2019 10:27 AM Celestino Marques MD Prescription Question

## 2019-12-31 ENCOUNTER — TELEPHONE (OUTPATIENT)
Dept: FAMILY MEDICINE CLINIC | Age: 44
End: 2019-12-31

## 2019-12-31 NOTE — TELEPHONE ENCOUNTER
Jude Phillips  Female, 38 y.o., 1975  Weight:   151 lb (68.5 kg)  MRN:   [id number]  Phone:   [phone] (M)  PCP:   Hernán Mccollum MD  Primary Cvg:   [facility]  Last Appt With Me  None  Next Appt With Me  None  Next Appt  1/14/20 - Hernán Mccollum MD - [address] [facility] [address]  Non-Urgent Medical Question     From  Hansa Pierre To  [facility] Nurse Pool Sent  12/31/2019 12:09 PM   I need another doctors note. I am waiting to have that other test done. I still can not drive and still dizzy. Therapy doctor said she should hear something by middle to end of January. Need out until February 1st. I believe that should be enough time. Thank you.     Encounter Messages     Read Composed From To Subject   Y 12/31/2019 12:09 PM Irene Bell MD Non-Urgent Medical Question

## 2020-01-14 ENCOUNTER — OFFICE VISIT (OUTPATIENT)
Dept: FAMILY MEDICINE CLINIC | Age: 45
End: 2020-01-14

## 2020-01-14 VITALS
HEART RATE: 90 BPM | TEMPERATURE: 98 F | BODY MASS INDEX: 29.15 KG/M2 | SYSTOLIC BLOOD PRESSURE: 126 MMHG | HEIGHT: 61 IN | DIASTOLIC BLOOD PRESSURE: 86 MMHG | OXYGEN SATURATION: 98 % | WEIGHT: 154.4 LBS | RESPIRATION RATE: 18 BRPM

## 2020-01-14 DIAGNOSIS — G43.719 CHRONIC MIGRAINE WITHOUT AURA, INTRACTABLE, WITHOUT STATUS MIGRAINOSUS: ICD-10-CM

## 2020-01-14 DIAGNOSIS — Z79.01 ANTICOAGULATED: ICD-10-CM

## 2020-01-14 DIAGNOSIS — R42 VERTIGO: Primary | ICD-10-CM

## 2020-01-14 DIAGNOSIS — D68.59 HYPERCOAGULABLE STATE (HCC): ICD-10-CM

## 2020-01-14 DIAGNOSIS — F41.0 PANIC ATTACKS: ICD-10-CM

## 2020-01-14 RX ORDER — MECLIZINE HYDROCHLORIDE 25 MG/1
TABLET ORAL
Refills: 1 | COMMUNITY
Start: 2019-11-28 | End: 2020-01-30

## 2020-01-14 NOTE — PROGRESS NOTES
Chief Complaint   Patient presents with    Multiple Sclerosis     Pt getting tested for MS. .1. Have you been to the ER, urgent care clinic since your last visit? Hospitalized since your last visit? No  2. Have you seen or consulted any other health care providers outside of the [address] [place] [address] since your last visit? Include any pap smears or colon screening.  No

## 2020-01-14 NOTE — PROGRESS NOTES
HISTORY OF PRESENT ILLNESS  Charli Trent is a 38 y.o. female. f/u vertigo,chronic migraine,hypercoag state,a/c mixed hyperlipidemia. Remains disabled with vertigo and rt sided headache. To see neuro next week  Headache   The history is provided by the patient. This is a chronic problem. The problem occurs daily. The problem has not changed since onset. Associated symptoms include headaches. Dizziness    The history is provided by the patient. This is a chronic problem. The problem occurs daily. The problem has not changed since onset. There was no loss of consciousness. The problem is associated with normal activity. Associated symptoms include headaches and dizziness. Pertinent negatives include no fever, no malaise/fatigue, no focal weakness and no weakness. Cholesterol Problem   The history is provided by the patient. This is a chronic problem. The problem occurs daily. Associated symptoms include headaches. Medication Evaluation   The problem occurs daily. The problem has not changed since onset. Associated symptoms include headaches. Review of Systems   Constitutional: Negative for fever and malaise/fatigue. Eyes: Negative for blurred vision. Respiratory: Positive for cough. Gastrointestinal: Negative for heartburn. Neurological: Positive for dizziness and headaches. Negative for speech change, focal weakness and weakness. Physical Exam  Constitutional:       Appearance: Normal appearance. HENT:      Head: Normocephalic and atraumatic. Nose: Nose normal.      Mouth/Throat:      Mouth: Mucous membranes are moist.   Neck:      Musculoskeletal: Normal range of motion and neck supple. Vascular: No carotid bruit. Cardiovascular:      Pulses: Normal pulses. Heart sounds: Normal heart sounds. Pulmonary:      Effort: Pulmonary effort is normal.      Breath sounds: Normal breath sounds. Lymphadenopathy:      Cervical: No cervical adenopathy.    Skin:     General: Skin is warm and dry. Neurological:      General: No focal deficit present. Mental Status: She is alert and oriented to person, place, and time. Mental status is at baseline. Cranial Nerves: No cranial nerve deficit. Motor: No weakness. Psychiatric:         Mood and Affect: Mood normal.         ASSESSMENT and PLAN  Diagnoses and all orders for this visit:    1. Vertigo,persistent  -     METABOLIC PANEL, COMPREHENSIVE  -     LIPID PANEL  -     CBC WITH AUTOMATED DIFF    2. Chronic migraine without aura, intractable, without status migrainosus,to see neuro    3. Hypercoagulable state ([facility] [address]    4. Panic attacks    5. Anticoagulated      Follow-up and Dispositions    · Return in about 2 months (around 3/14/2020).

## 2020-01-15 LAB
ALBUMIN SERPL-MCNC: 4.6 G/DL (ref 3.5–5.5)
ALBUMIN/GLOB SERPL: 1.8 {RATIO} (ref 1.2–2.2)
ALP SERPL-CCNC: 97 IU/L (ref 39–117)
ALT SERPL-CCNC: 18 IU/L (ref 0–32)
AST SERPL-CCNC: 18 IU/L (ref 0–40)
BASOPHILS # BLD AUTO: 0 X10E3/UL (ref 0–0.2)
BASOPHILS NFR BLD AUTO: 1 %
BILIRUB SERPL-MCNC: 0.2 MG/DL (ref 0–1.2)
BUN SERPL-MCNC: 14 MG/DL (ref 6–24)
BUN/CREAT SERPL: 17 (ref 9–23)
CALCIUM SERPL-MCNC: 10.3 MG/DL (ref 8.7–10.2)
CHLORIDE SERPL-SCNC: 102 MMOL/L (ref 96–106)
CHOLEST SERPL-MCNC: 366 MG/DL (ref 100–199)
CO2 SERPL-SCNC: 21 MMOL/L (ref 20–29)
CREAT SERPL-MCNC: 0.81 MG/DL (ref 0.57–1)
EOSINOPHIL # BLD AUTO: 0.2 X10E3/UL (ref 0–0.4)
EOSINOPHIL NFR BLD AUTO: 3 %
ERYTHROCYTE [DISTWIDTH] IN BLOOD BY AUTOMATED COUNT: 14.4 % (ref 11.7–15.4)
GLOBULIN SER CALC-MCNC: 2.5 G/DL (ref 1.5–4.5)
GLUCOSE SERPL-MCNC: 97 MG/DL (ref 65–99)
HCT VFR BLD AUTO: 40.5 % (ref 34–46.6)
HDLC SERPL-MCNC: 74 MG/DL
HGB BLD-MCNC: 14 G/DL (ref 11.1–15.9)
IMM GRANULOCYTES # BLD AUTO: 0 X10E3/UL (ref 0–0.1)
IMM GRANULOCYTES NFR BLD AUTO: 0 %
INTERPRETATION, 910389: NORMAL
LDLC SERPL CALC-MCNC: 259 MG/DL (ref 0–99)
LYMPHOCYTES # BLD AUTO: 2 X10E3/UL (ref 0.7–3.1)
LYMPHOCYTES NFR BLD AUTO: 38 %
MCH RBC QN AUTO: 31.5 PG (ref 26.6–33)
MCHC RBC AUTO-ENTMCNC: 34.6 G/DL (ref 31.5–35.7)
MCV RBC AUTO: 91 FL (ref 79–97)
MONOCYTES # BLD AUTO: 0.4 X10E3/UL (ref 0.1–0.9)
MONOCYTES NFR BLD AUTO: 8 %
NEUTROPHILS # BLD AUTO: 2.7 X10E3/UL (ref 1.4–7)
NEUTROPHILS NFR BLD AUTO: 50 %
PLATELET # BLD AUTO: 247 X10E3/UL (ref 150–450)
POTASSIUM SERPL-SCNC: 4.4 MMOL/L (ref 3.5–5.2)
PROT SERPL-MCNC: 7.1 G/DL (ref 6–8.5)
RBC # BLD AUTO: 4.45 X10E6/UL (ref 3.77–5.28)
SODIUM SERPL-SCNC: 138 MMOL/L (ref 134–144)
TRIGL SERPL-MCNC: 165 MG/DL (ref 0–149)
VLDLC SERPL CALC-MCNC: 33 MG/DL (ref 5–40)
WBC # BLD AUTO: 5.3 X10E3/UL (ref 3.4–10.8)

## 2020-01-20 ENCOUNTER — TELEPHONE (OUTPATIENT)
Dept: FAMILY MEDICINE CLINIC | Age: 45
End: 2020-01-20

## 2020-01-20 DIAGNOSIS — E78.2 MIXED HYPERLIPIDEMIA: Primary | ICD-10-CM

## 2020-01-20 DIAGNOSIS — G89.29 CHRONIC NONINTRACTABLE HEADACHE, UNSPECIFIED HEADACHE TYPE: ICD-10-CM

## 2020-01-20 DIAGNOSIS — R51.9 CHRONIC NONINTRACTABLE HEADACHE, UNSPECIFIED HEADACHE TYPE: ICD-10-CM

## 2020-01-20 DIAGNOSIS — M54.2 CERVICALGIA: ICD-10-CM

## 2020-01-20 RX ORDER — ATORVASTATIN CALCIUM 20 MG/1
20 TABLET, FILM COATED ORAL DAILY
Qty: 30 TAB | Refills: 6 | Status: SHIPPED | OUTPATIENT
Start: 2020-01-20 | End: 2020-08-09

## 2020-01-20 RX ORDER — BUTALBITAL, ACETAMINOPHEN AND CAFFEINE 50; 325; 40 MG/1; MG/1; MG/1
TABLET ORAL
Qty: 60 TAB | Refills: 0 | Status: SHIPPED | OUTPATIENT
Start: 2020-01-20 | End: 2020-01-30

## 2020-01-20 NOTE — TELEPHONE ENCOUNTER
Jude Phillips  Female, 38 y.o., 1975  Weight:   154 lb 6.4 oz (70 kg)  MRN:   [id number]  Phone:   [phone] Sally Villareal)  PCP:   Yamilet Spring MD  Primary Cvg:   [facility] - [address] With Me  None  Next Appt With Me  None  Next Appt  1/23/20 - Jay Becker MD - [address] [address]  Test Results Question     From  [facility] To  [facility] Nurse Pool Sent  1/20/2020 11:42 AM   I have been doing the diet for high cholesterol since my last blood work came in. I eat fish and chicken baked, fruits, vegetables,  no eggs, no red meat. With the dizziness/vertigo I walk as much as I can during the day but can't do more than that right now. What other options do I have to lower it? I know it runs in my mothers side of the family. Thank you for all your help in this matter.    Ronit Melgar    Encounter Messages     Read Composed From To  Subject   Y 1/20/2020 11:42 AM Darrian Antoine MD  Test Results Question

## 2020-01-22 ENCOUNTER — TELEPHONE (OUTPATIENT)
Dept: FAMILY MEDICINE CLINIC | Age: 45
End: 2020-01-22

## 2020-01-22 DIAGNOSIS — R00.2 PALPITATIONS: Primary | ICD-10-CM

## 2020-01-22 RX ORDER — METOPROLOL TARTRATE 25 MG/1
12.5 TABLET, FILM COATED ORAL 2 TIMES DAILY
Qty: 30 TAB | Refills: 3 | Status: SHIPPED | OUTPATIENT
Start: 2020-01-22 | End: 2020-05-13

## 2020-01-22 NOTE — TELEPHONE ENCOUNTER
Jude Phillips  Female, 38 y.o., 1975  Weight:   154 lb 6.4 oz (70 kg)  MRN:   [id number]  Phone:   [phone] Davion Madrigal  PCP:   Saurabh Brunson MD  Primary Cvg:   [facility] - [address] With Me  None  Next Appt With Me  None  Next Appt  1/23/20 - Laura Smith MD - [address]  Non-Urgent Medical Question     From  [facility] To  [facility] Nurse Pool Sent  1/21/2020  2:26 PM   Heart rate is 121 while I am just sitting. A little elevated. Not sure if it is due to the medicine (lipitor) or not but thought you should know.     Attachments     [id number].[facility]   Encounter Messages     Read Composed From To  Subject   Y 1/21/2020  2:26 PM Linda Samayoa MD  Non-Urgent Medical Question

## 2020-01-23 ENCOUNTER — OFFICE VISIT (OUTPATIENT)
Dept: NEUROLOGY | Age: 45
End: 2020-01-23

## 2020-01-23 VITALS
DIASTOLIC BLOOD PRESSURE: 65 MMHG | WEIGHT: 157 LBS | BODY MASS INDEX: 29.64 KG/M2 | HEIGHT: 61 IN | HEART RATE: 75 BPM | SYSTOLIC BLOOD PRESSURE: 125 MMHG | OXYGEN SATURATION: 98 %

## 2020-01-23 DIAGNOSIS — R42 VERTIGO: Primary | ICD-10-CM

## 2020-01-23 RX ORDER — NORTRIPTYLINE HYDROCHLORIDE 25 MG/1
CAPSULE ORAL
Qty: 60 CAP | Refills: 3 | Status: SHIPPED | OUTPATIENT
Start: 2020-01-23 | End: 2020-04-06

## 2020-01-23 RX ORDER — RIZATRIPTAN BENZOATE 10 MG/1
TABLET, ORALLY DISINTEGRATING ORAL
Qty: 9 TAB | Refills: 3 | Status: SHIPPED | OUTPATIENT
Start: 2020-01-23 | End: 2022-02-01 | Stop reason: ALTCHOICE

## 2020-01-23 NOTE — PROGRESS NOTES
Neurology follow-up note    Chief Complaint   Patient presents with    Follow-up     follow up 1 month from first botox injection    Headache       Soo Phillips is a 38 y.o. female who presented to the neurology office for management of migraines. She started having headaches at the age of 26 yrs and it has been gradually getting worse. Her headaches are in the right temporal area and then progressed to the neck. There is associated photophobia, phonophobia, nausea and vomiting. It is pounding in character. It lasts for 4 hrs to a week. It is around 10/10 in severity. She goes to there ER around 1-2/month. Baseline headache frequency: 20/month  Headaches with botox: 20/month    Risk Factors for headaches  Smoking: denies  Coffee: 0 cups/day  Tea: 1 decaf cups/day  Soda: 0 cans/day  Water: 8-12 glasses/day  Sleeps at 10 pm and wakes up at 6 am. She does not snore. Medications tried  Preventative therapy:  Botox  Amitriptyline  Nortriptyline  Topamax  Depakote  Lisinopril  Cymbalta  Effexor  Ajovy  Metoprolol    Abortive therapy:   Fioricet  Excedrin migraine  Maxalt     Current Outpatient Medications   Medication Sig    nortriptyline (PAMELOR) 25 mg capsule 1 capsule every night for 1 week and then 2 capsules at night    rizatriptan (MAXALT-MLT) 10 mg disintegrating tablet Take 1 at onset of severe headache; may repeat another dose in 2 hours if headaches persist    metoprolol tartrate (LOPRESSOR) 25 mg tablet Take 0.5 Tabs by mouth two (2) times a day.  butalbital-acetaminophen-caffeine (FIORICET, ESGIC) -40 mg per tablet TAKE 1 TABLET BY MOUTH EVERY 6 HOURS AS NEEDED FOR PAIN    atorvastatin (LIPITOR) 20 mg tablet Take 1 Tab by mouth daily.  meclizine (ANTIVERT) 25 mg tablet TAKE 2 TABS BY MOUTH THREE (3) TIMES DAILY AS NEEDED FOR DIZZINESS FOR UP TO 10 DAYS.  rivaroxaban (XARELTO) 20 mg tab tablet Take 1 Tab by mouth daily (with breakfast).     promethazine-dextromethorphan (PROMETHAZINE-DM) 6.25-15 mg/5 mL syrup TAKE 5 ML BY MOUTH EVERY FOUR (4) HOURS AS NEEDED FOR COUGH FOR UP TO 7 DAYS.  sertraline (ZOLOFT) 100 mg tablet TAKE 1 TABLET BY MOUTH ONCE DAILY    ALPRAZolam (XANAX) 0.5 mg tablet TAKE 1 TABLET BY MOUTH TWICE A DAY AS NEEDED FOR ANXIETY. MAX DAILY AMOUNT: 2 TABLETS.  onabotulinumtoxinA (BOTOX) 200 unit injection Inject 200 units by IM route to 31 FDA approved sites in head and neck every 12 weeks    ondansetron (ZOFRAN ODT) 4 mg disintegrating tablet Take 1 Tab by mouth every eight (8) hours as needed for Nausea.  ascorbic acid, vitamin C, (VITAMIN C) 100 mg tab Take 1 Tab by mouth two (2) times a day. (Patient taking differently: Take 100 mg by mouth daily.)    omeprazole (PRILOSEC) 20 mg capsule Take 20 mg by mouth daily.  cetirizine-pseudoePHEDrine (ZYRTEC-D) 5-120 mg Tb12 Take 1 Tab by mouth two (2) times a day.  Magnesium Oxide 500 mg cap Take  by mouth.  ferrous sulfate (IRON) 325 mg (65 mg iron) tablet Take  by mouth Daily (before breakfast).  potassium 99 mg tablet Take 99 mg by mouth daily.  zinc 50 mg tab tablet Take  by mouth daily.  cyanocobalamin (VITAMIN B12) 500 mcg tablet Take 500 mcg by mouth daily.  acetaminophen (TYLENOL EXTRA STRENGTH) 500 mg tablet Take 1,000 mg by mouth.  albuterol-ipratropium (DUO-NEB) 2.5 mg-0.5 mg/3 ml nebu USE 1 VIAL VIA NEBULIZER EVERY 6 HOURS AS NEEDED    albuterol (PROVENTIL HFA) 90 mcg/actuation inhaler Take 2 Puffs by inhalation every four (4) hours as needed for Wheezing.  chlorpheniramine-HYDROcodone (TUSSIONEX) 10-8 mg/5 mL suspension Take 5 mL by mouth every twelve (12) hours as needed for Cough. Max Daily Amount: 10 mL.  aspirin 81 mg chewable tablet Take 81 mg by mouth daily. No current facility-administered medications for this visit.         Past Medical History:   Diagnosis Date    Anxiety     Asthma     Depression     Factor V deficiency ([facility] [address]     Heart rate fast     Long term current use of anticoagulant therapy     Migraine     Thromboembolus ([facility] [address] 2006    DVT left groin     Past Surgical History:   Procedure Laterality Date    ABDOMEN SURGERY PROC UNLISTED      laproscopy removed adhesions on colon    DILATION AND CURETTAGE      HX APPENDECTOMY  1996    HX  SECTION      HX HERNIA REPAIR  2019     [facility] incarcerated ventral hernia repair    HX HYSTERECTOMY  2018    HX ORTHOPAEDIC  2009    bone removed from foot    HX TUBAL LIGATION  c sec     Family History   Problem Relation Age of Onset    Diabetes Mother     Alzheimer Mother     Hypertension Mother     High Cholesterol Mother     Headache Mother     Heart Disease Mother     Cancer Father     Headache Father     Heart Disease Father     Diabetes Sister     No Known Problems Brother     Immunodeficiency Sister     No Known Problems Sister     Cancer Maternal Uncle     Cancer Maternal Grandfather      Social History     Tobacco Use    Smoking status: Former Smoker     Packs/day: 0.50     Years: 21.00     Pack years: 10.50     Types: Cigarettes     Last attempt to quit: 3/5/2016     Years since quitting: 3.8    Smokeless tobacco: Never Used   Substance Use Topics    Alcohol use: Yes     Alcohol/week: 0.0 standard drinks     Comment: Occ    Drug use: No       REVIEW OF SYSTEMS:   A ten system review of constitutional, cardiovascular, respiratory, musculoskeletal, endocrine, skin, SHEENT, genitourinary, psychiatric and neurologic systems was obtained and is unremarkable except headaches and dizziness    EXAMINATION:   Visit Vitals  /65   Pulse 75   Ht 5' 1\" (1.549 m)   Wt 157 lb (71.2 kg)   LMP 06/10/2017   SpO2 98%   BMI 29.66 kg/m²        General:   General appearance: Pt is in no acute distress   Distal pulses are preserved    Neurological Examination:   Mental Status: AAO x3. Speech is fluent.  Follows commands, has normal fund of knowledge, attention, short term recall, comprehension and insight. Cranial Nerves: Visual fields are full. PERRL, Extraocular movements are full. Facial sensation intact. Facial movement intact. Hearing intact to conversation. Palate elevates symmetrically. Shoulder shrug symmetric. Tongue midline. Motor: Strength is 5/5 in all 4 ext. Sensation: Light touch - Normal    Coordination/Cerebellar: Intact to finger-nose-finger     Skin: No significant bruising or lacerations. Laboratory review:   Results for orders placed or performed in visit on 60/90/99   METABOLIC PANEL, COMPREHENSIVE   Result Value Ref Range    Glucose 97 65 - 99 mg/dL    BUN 14 6 - 24 mg/dL    Creatinine 0.81 0.57 - 1.00 mg/dL    GFR est non-AA 89 >59 mL/min/1.73    GFR est  >59 mL/min/1.73    BUN/Creatinine ratio 17 9 - 23    Sodium 138 134 - 144 mmol/L    Potassium 4.4 3.5 - 5.2 mmol/L    Chloride 102 96 - 106 mmol/L    CO2 21 20 - 29 mmol/L    Calcium 10.3 (H) 8.7 - 10.2 mg/dL    Protein, total 7.1 6.0 - 8.5 g/dL    Albumin 4.6 3.5 - 5.5 g/dL    GLOBULIN, TOTAL 2.5 1.5 - 4.5 g/dL    A-G Ratio 1.8 1.2 - 2.2    Bilirubin, total 0.2 0.0 - 1.2 mg/dL    Alk. phosphatase 97 39 - 117 IU/L    AST (SGOT) 18 0 - 40 IU/L    ALT (SGPT) 18 0 - 32 IU/L   LIPID PANEL   Result Value Ref Range    Cholesterol, total 366 (H) 100 - 199 mg/dL    Triglyceride 165 (H) 0 - 149 mg/dL    HDL Cholesterol 74 >39 mg/dL    VLDL, calculated 33 5 - 40 mg/dL    LDL, calculated 259 (H) 0 - 99 mg/dL   CBC WITH AUTOMATED DIFF   Result Value Ref Range    WBC 5.3 3.4 - 10.8 x10E3/uL    RBC 4.45 3.77 - 5.28 x10E6/uL    HGB 14.0 11.1 - 15.9 g/dL    HCT 40.5 34.0 - 46.6 %    MCV 91 79 - 97 fL    MCH 31.5 26.6 - 33.0 pg    MCHC 34.6 31.5 - 35.7 g/dL    RDW 14.4 11.7 - 15.4 %    PLATELET 796 131 - 398 x10E3/uL    NEUTROPHILS 50 Not Estab. %    Lymphocytes 38 Not Estab. %    MONOCYTES 8 Not Estab. %    EOSINOPHILS 3 Not Estab. %    BASOPHILS 1 Not Estab. %    ABS. NEUTROPHILS 2.7 1.4 - 7.0 x10E3/uL    Abs Lymphocytes 2.0 0.7 - 3.1 x10E3/uL    ABS. MONOCYTES 0.4 0.1 - 0.9 x10E3/uL    ABS. EOSINOPHILS 0.2 0.0 - 0.4 x10E3/uL    ABS. BASOPHILS 0.0 0.0 - 0.2 x10E3/uL    IMMATURE GRANULOCYTES 0 Not Estab. %    ABS. IMM. GRANS. 0.0 0.0 - 0.1 x10E3/uL   CVD REPORT   Result Value Ref Range    INTERPRETATION Note        Imaging review:  2019  MRI brain  Normal    Documentation review:  None    Assessment/Plan:   Ignacio Black is a 38 y.o. female who presented to the neurology office for management of headaches. Based on the description she does have chronic intractable migraines. She has had 1 course of Botox that has not helped her. We will continue with a second course of Botox and will restart her on amitriptyline 25 mg p.o. nightly for a week and then 50 mg p.o. nightly. We will refill her Maxalt. Patient does have problem with tachycardia and hence did not prescribe Fioricet at this time as it has caffeine in it. Follow-up for next Botox    Over 40 minutes was spent with the patient of which > 50% of the visit was spent counseling on diagnosis, management, and treatment of the diagnosis. I did discuss about her dizziness, migraines and possible medications. 3 most recent PHQ Screens 12/10/2019   Little interest or pleasure in doing things Not at all   Feeling down, depressed, irritable, or hopeless Not at all   Total Score PHQ 2 0     Primary care to address possible depression if PHQ-9 score is more than 9. ICD-10-CM ICD-9-CM    1. Vertigo R42 780.4    2. Chronic migraine G43.709 346.70 nortriptyline (PAMELOR) 25 mg capsule      rizatriptan (MAXALT-MLT) 10 mg disintegrating tablet        Electronically signed. Yamileth Betancur MD  Neurologist    CC: Janene Berrios MD  Fax: [phone]    This note was created using voice recognition software. Despite editing, there may be syntax errors.

## 2020-01-23 NOTE — PATIENT INSTRUCTIONS

## 2020-01-24 ENCOUNTER — TELEPHONE (OUTPATIENT)
Dept: FAMILY MEDICINE CLINIC | Age: 45
End: 2020-01-24

## 2020-01-24 DIAGNOSIS — F41.9 ANXIETY DISORDER, UNSPECIFIED TYPE: Primary | ICD-10-CM

## 2020-01-24 DIAGNOSIS — F41.0 PANIC ATTACKS: ICD-10-CM

## 2020-01-24 RX ORDER — ALPRAZOLAM 0.5 MG/1
0.5 TABLET ORAL
Qty: 90 TAB | Refills: 2 | Status: SHIPPED | OUTPATIENT
Start: 2020-01-24 | End: 2020-03-23

## 2020-01-24 NOTE — TELEPHONE ENCOUNTER
Jude Phillips  Female, 38 y.o., 1975  Weight:   157 lb (71.2 kg)  MRN:   [id number]  Phone:   [phone] (M)  PCP:   Ankush Bedolla MD  Primary Cvg:   [facility] - [address] With Me  None  Next Appt With Me  None  Next Appt  3/27/20 - Maggi Garcia MD - [address]  Prescription Question     From  Cesar Biswas To  [facility] Nurse Pool Sent  1/24/2020  1:17 PM   Is there a way to get prescription for Xanax to take up to 3 in a day? Dr. Terell Claudio you know I have been due to my medical situation. I have more anxiety than ever right now and more depression. Maybe up the Zoloft as well. Until we can figure out exactly what is going on with me.     Encounter Messages     Read Composed From To  Subject   Y 1/24/2020  1:17 PM Briseida Hurd MD  Prescription Question

## 2020-01-27 ENCOUNTER — TELEPHONE (OUTPATIENT)
Dept: FAMILY MEDICINE CLINIC | Age: 45
End: 2020-01-27

## 2020-01-27 DIAGNOSIS — R00.2 PALPITATIONS: Primary | ICD-10-CM

## 2020-01-27 NOTE — TELEPHONE ENCOUNTER
.Subject of call discussed in detail,recommendations  Advised to go to ER if sx worsen. Cardiology appt to be arranged

## 2020-01-27 NOTE — TELEPHONE ENCOUNTER
Jude Phillips  Female, 38 y.o., 1975  Weight:   157 lb (71.2 kg)  MRN:   [id number]  Phone:   [phone] (M)  PCP:   Celi Gonzales MD  Primary Cvg:   [facility] - [facility]  Last Appt With Me  None  Next Appt With Me  None  Next Appt  3/27/20 - Kezia Vela MD - [address] [address]  Visit Follow-Up Question     From  Sanam Billy To  [facility] Nurse Pool Sent  1/27/2020 10:16 AM   I was told to let you know in a couple of days how I am doing after starting the metoprolol. Last night around 3 I woke up chest hurting, pain in my left arm, and indigestion. Came down stairs took blood pressure and heart rate. Blood pressure a little low but heart rate good. This morning still pain in my chest with headache and dizziness.     Attachments     [phone].[facility]   Encounter Messages     Read Composed From To  Subject   Y 1/27/2020 10:16 AM Poli Carrasco MD  Visit Follow-Up Question

## 2020-01-28 ENCOUNTER — TELEPHONE (OUTPATIENT)
Dept: FAMILY MEDICINE CLINIC | Age: 45
End: 2020-01-28
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
## 2020-01-28 NOTE — TELEPHONE ENCOUNTER
Jude Phillips  Female, 38 y.o., 1975  Weight:   157 lb (71.2 kg)  MRN:   [id number]  Phone:   [phone] [facility])  PCP:   Maeve Jacobs MD  Primary Cvg:   [facility] - [address] With Me  None  Next Appt With Me  None  Next Appt  3/27/20 - Isabell Mcknight MD - [address]  Non-Urgent Medical Question     From  [place] To  [facility] Nurse Pool Sent  1/28/2020  3:44 PM   I just took my blood pressure and heart rate. This is what it is, and Amrita Perez is bringing me to ER. My chest is hurting to bad not to go.     Attachments     [phone].[facility]   Encounter Messages     Read Composed From To  Subject   Y 1/28/2020  3:44 PM Laith Perales MD  Non-Urgent Medical Question

## 2020-01-29 ENCOUNTER — TELEPHONE (OUTPATIENT)
Dept: FAMILY MEDICINE CLINIC | Age: 45
End: 2020-01-29

## 2020-01-29 DIAGNOSIS — R07.9 CHEST PAIN, UNSPECIFIED TYPE: Primary | ICD-10-CM

## 2020-01-29 NOTE — TELEPHONE ENCOUNTER
Jude Phillips  Female, 38 y.o., 1975  Weight:   157 lb (71.2 kg)  MRN:   [id number]  Phone:   [phone] (M)  PCP:   Saurabh Brunson MD  Primary Cvg:   MIL/YOBANI RULE - [address] With Me  None  Next Appt With Me  None  Next Appt  1/30/20 - Tomas Terrell MD - [facility]  Non-Urgent Medical Question     From  [facility] To  [facility] Nurse Pool Sent  1/29/2020  2:30 PM   Velpen Jenks will stop after work and  doctors note if that is ok. Thank you.     Encounter Messages     Read Composed From To  Subject   Y 1/29/2020  2:30 PM Linda Samayoa MD  Non-Urgent Medical Question

## 2020-01-29 NOTE — TELEPHONE ENCOUNTER
Jude Phillips  Female, 38 y.o., 1975  Weight:   157 lb (71.2 kg)  MRN:   [id number]  Phone:   [phone] Shakira Smith)  PCP:   Marquise Lebron MD  Primary Cvg:   MIL/YOBANI RULE - [address] With Me  None  Next Appt With Me  None  Next Appt  3/27/20 - Eddi Cramer MD - [facility] [phone]  Non-Urgent Medical Question     From  Trudy Carlisle To  [facility] Nurse Pool Sent  1/29/2020 10:30 AM   I went to [facility] emergency room on [address] yesterday. They did blood work, and a ct scan. No heart attack. Referral to Cardiologist for stress test. Gave tramadol 50 mg for pain, take one 3 x day for 4 days. They also found 2 spots on my right lung. Said could be from infection but should have it checked and monitored.     Encounter Messages     Read Composed From To  Subject   Y 1/29/2020 10:30 AM Racheal Daley MD  Non-Urgent Medical Question

## 2020-01-29 NOTE — TELEPHONE ENCOUNTER
Jude Phillips  Female, 38 y.o., 1975  Weight:   157 lb (71.2 kg)  MRN:   [id number]  Phone:   [phone] Marjorie Villareal)  PCP:   Sarah Miranda MD  Primary Cvg:   MIL/YOBANI SNYDER - [facility]  Last Appt With Me  None  Next Appt With Me  None  Next Appt  3/27/20 - Enrique Fraser MD - [address]  Referral Request     From  Miki Aponte To  [facility] Nurse Pool Sent  1/29/2020 12:00 PM   Can I get a referral to see Dr. Olga Barney?     Attachments     [id number]   Encounter Messages     Read Composed From To  Subject   Y 1/29/2020 12:00 PM Abel Lafleur MD  Referral Request

## 2020-01-30 ENCOUNTER — OFFICE VISIT (OUTPATIENT)
Dept: CARDIOLOGY CLINIC | Age: 45
End: 2020-01-30

## 2020-01-30 VITALS
OXYGEN SATURATION: 94 % | BODY MASS INDEX: 29.6 KG/M2 | DIASTOLIC BLOOD PRESSURE: 74 MMHG | RESPIRATION RATE: 18 BRPM | WEIGHT: 156.8 LBS | HEART RATE: 74 BPM | HEIGHT: 61 IN | SYSTOLIC BLOOD PRESSURE: 96 MMHG

## 2020-01-30 DIAGNOSIS — R00.0 TACHYCARDIA: ICD-10-CM

## 2020-01-30 DIAGNOSIS — E78.2 MIXED HYPERLIPIDEMIA: ICD-10-CM

## 2020-01-30 DIAGNOSIS — D68.59 HYPERCOAGULABLE STATE (HCC): ICD-10-CM

## 2020-01-30 DIAGNOSIS — D68.51 FACTOR V LEIDEN (HCC): ICD-10-CM

## 2020-01-30 DIAGNOSIS — R07.9 CHEST PAIN, UNSPECIFIED TYPE: Primary | ICD-10-CM

## 2020-01-30 RX ORDER — TRAMADOL HYDROCHLORIDE 50 MG/1
TABLET ORAL
COMMUNITY
Start: 2020-01-28 | End: 2022-02-01 | Stop reason: ALTCHOICE

## 2020-01-30 NOTE — PROGRESS NOTES
[address] [place] [address], [address] [facility]  [phone]     Subjective:      Barry Baez is a 38 y.o. female with pmhx chronic migraine vertigo HLD is here for further evaluation and treatment of   Nonexertional Left sided cp radiating to left arm and neck associated with sob, started 2020. Went to [facility] urgent care 2020 with -ve cardiac work up. No records to review. Cardiac risk factors: HLD female postmenopausal elevated BMI former smoker family hx of CAD (mother stent late 45s and father   From heart attack mid 46s)    The patient denies  orthopnea, PND, LE edema, palpitations, syncope, or presyncope. Patient Active Problem List    Diagnosis Date Noted    Hypercoagulable state ([address] 12/15/2019    Chronic migraine 2019    Vertigo 2019    Pain in joint of right foot 10/01/2019    Peripheral vascular disease ([address] 2019    Ventral hernia 2019    Anxiety     Intractable migraine with aura with status migrainosus 2016    Subacute maxillary sinusitis 2016      Brittney Jauregui MD  Past Medical History:   Diagnosis Date    Anxiety     Asthma     Depression     Factor V deficiency ([address]     Heart rate fast     Long term current use of anticoagulant therapy     Migraine     Thromboembolus ([address] 2006    DVT left groin      Past Surgical History:   Procedure Laterality Date    ABDOMEN SURGERY PROC UNLISTED      laproscopy removed adhesions on colon    DILATION AND CURETTAGE      HX APPENDECTOMY      HX  SECTION      HX HERNIA REPAIR  2019     Paul Elliott incarcerated ventral hernia repair    HX HYSTERECTOMY  2018    HX ORTHOPAEDIC  2009    bone removed from foot    HX TUBAL LIGATION  c sec     Allergies   Allergen Reactions    Adhesive Tape-Silicones Other (comments)     Pt states this burns her skin.       Family History   Problem Relation Age of Onset    Diabetes Mother    Belkys Alzheimer Mother     Hypertension Mother     High Cholesterol Mother     Headache Mother     Heart Disease Mother     Cancer Father     Headache Father     Heart Disease Father     Diabetes Sister     No Known Problems Brother     Immunodeficiency Sister     No Known Problems Sister     Cancer Maternal Uncle     Cancer Maternal Grandfather       Social History     Socioeconomic History    Marital status:      Spouse name: Not on file    Number of children: Not on file    Years of education: Not on file    Highest education level: Not on file   Occupational History    Not on file   Social Needs    Financial resource strain: Not on file    Food insecurity:     Worry: Not on file     Inability: Not on file    Transportation needs:     Medical: Not on file     Non-medical: Not on file   Tobacco Use    Smoking status: Former Smoker     Packs/day: 0.50     Years: 21.00     Pack years: 10.50     Types: Cigarettes     Last attempt to quit: 3/5/2016     Years since quitting: 3.9    Smokeless tobacco: Never Used   Substance and Sexual Activity    Alcohol use: Not Currently     Alcohol/week: 0.0 standard drinks     Comment: Occ    Drug use: No    Sexual activity: Yes     Partners: Male     Birth control/protection: I.U.D.    Lifestyle    Physical activity:     Days per week: Not on file     Minutes per session: Not on file    Stress: Not on file   Relationships    Social connections:     Talks on phone: Not on file     Gets together: Not on file     Attends [employer] service: Not on file     Active member of club or organization: Not on file     Attends meetings of clubs or organizations: Not on file     Relationship status: Not on file    Intimate partner violence:     Fear of current or ex partner: Not on file     Emotionally abused: Not on file     Physically abused: Not on file     Forced sexual activity: Not on file   Other Topics Concern    Not on file   Social History Narrative    ** Merged History Encounter **           Current Outpatient Medications   Medication Sig    traMADol (ULTRAM) 50 mg tablet     ALPRAZolam (XANAX) 0.5 mg tablet Take 1 Tab by mouth three (3) times daily as needed for Anxiety. Max Daily Amount: 1.5 mg.    nortriptyline (PAMELOR) 25 mg capsule 1 capsule every night for 1 week and then 2 capsules at night    rizatriptan (MAXALT-MLT) 10 mg disintegrating tablet Take 1 at onset of severe headache; may repeat another dose in 2 hours if headaches persist    metoprolol tartrate (LOPRESSOR) 25 mg tablet Take 0.5 Tabs by mouth two (2) times a day.  atorvastatin (LIPITOR) 20 mg tablet Take 1 Tab by mouth daily.  rivaroxaban (XARELTO) 20 mg tab tablet Take 1 Tab by mouth daily (with breakfast).  sertraline (ZOLOFT) 100 mg tablet TAKE 1 TABLET BY MOUTH ONCE DAILY    onabotulinumtoxinA (BOTOX) 200 unit injection Inject 200 units by IM route to 31 FDA approved sites in head and neck every 12 weeks    ondansetron (ZOFRAN ODT) 4 mg disintegrating tablet Take 1 Tab by mouth every eight (8) hours as needed for Nausea.  ascorbic acid, vitamin C, (VITAMIN C) 100 mg tab Take 1 Tab by mouth two (2) times a day. (Patient taking differently: Take 100 mg by mouth daily.)    omeprazole (PRILOSEC) 20 mg capsule Take 20 mg by mouth daily.  acetaminophen (TYLENOL EXTRA STRENGTH) 500 mg tablet Take 1,000 mg by mouth every six (6) hours as needed.  albuterol-ipratropium (DUO-NEB) 2.5 mg-0.5 mg/3 ml nebu USE 1 VIAL VIA NEBULIZER EVERY 6 HOURS AS NEEDED    albuterol (PROVENTIL HFA) 90 mcg/actuation inhaler Take 2 Puffs by inhalation every four (4) hours as needed for Wheezing.  aspirin 81 mg chewable tablet Take 81 mg by mouth daily.  chlorpheniramine-HYDROcodone (TUSSIONEX) 10-8 mg/5 mL suspension Take 5 mL by mouth every twelve (12) hours as needed for Cough. Max Daily Amount: 10 mL. No current facility-administered medications for this visit.           Review of Symptoms:  11 systems reviewed, negative other than as stated in the HPI    Physical ExamPhysical Exam:    Vitals:    01/30/20 1032 01/30/20 1101 01/30/20 1103   BP: 98/74 100/70 96/74   Pulse: 74     Resp: 18     SpO2: 94%     Weight: 156 lb 12.8 oz (71.1 kg)     Height: 5' 1\" (1.549 m)       Body mass index is 29.63 kg/m². General PE  Gen:  NAD  Mental Status - Alert. General Appearance - Not in acute distress. HEENT:  PERRL, no carotid bruits or JVD  Chest and Lung Exam   Inspection: Accessory muscles - No use of accessory muscles in breathing. Auscultation:   Breath sounds: - Normal.   Cardiovascular   Inspection: Jugular vein - Bilateral - Inspection Normal.   Palpation/Percussion:   Apical Impulse: - Normal.   Auscultation: Rhythm - Regular. Heart Sounds - S1 WNL and S2 WNL. No S3 or S4. Murmurs & Other Heart Sounds: Auscultation of the heart reveals - No Murmurs. Peripheral Vascular   Upper Extremity: Inspection - Bilateral - No Cyanotic nailbeds or Digital clubbing. Lower Extremity:   Palpation: Edema - Bilateral - No edema. Abdomen:   Soft, non-tender, bowel sounds are active.   Neuro: A&O times 3, CN and motor grossly WNL    Labs:   Lab Results   Component Value Date/Time    Cholesterol, total 366 (H) 01/14/2020 10:45 AM    Cholesterol, total 250 (H) 05/13/2019 10:45 AM    HDL Cholesterol 74 01/14/2020 10:45 AM    HDL Cholesterol 51 05/13/2019 10:45 AM    LDL, calculated 259 (H) 01/14/2020 10:45 AM    LDL, calculated 132 (H) 05/13/2019 10:45 AM    Triglyceride 165 (H) 01/14/2020 10:45 AM    Triglyceride 336 (H) 05/13/2019 10:45 AM     No results found for: CPK, CPKX, CPX  Lab Results   Component Value Date/Time    Sodium 138 01/14/2020 10:45 AM    Potassium 4.4 01/14/2020 10:45 AM    Chloride 102 01/14/2020 10:45 AM    CO2 21 01/14/2020 10:45 AM    Anion gap 7 08/02/2019 02:42 PM    Glucose 97 01/14/2020 10:45 AM    BUN 14 01/14/2020 10:45 AM    Creatinine 0.81 01/14/2020 10:45 AM BUN/Creatinine ratio 17 2020 10:45 AM    GFR est  2020 10:45 AM    GFR est non-AA 89 2020 10:45 AM    Calcium 10.3 (H) 2020 10:45 AM    Bilirubin, total 0.2 2020 10:45 AM    AST (SGOT) 18 2020 10:45 AM    Alk. phosphatase 97 2020 10:45 AM    Protein, total 7.1 2020 10:45 AM    Albumin 4.6 2020 10:45 AM    Globulin 3.1 2014 03:35 PM    A-G Ratio 1.8 2020 10:45 AM    ALT (SGPT) 18 2020 10:45 AM       EKG:  SR     Assessment:     Assessment:      1. Chest pain, unspecified type    2. Hypercoagulable state ([facility]    3. Mixed hyperlipidemia    4. Tachycardia    5. Factor V Leiden ([facility]        Orders Placed This Encounter    CT HEART W/O CONT WITH CALCIUM     Standing Status:   Future     Standing Expiration Date:   2021     Scheduling Instructions:      Please do not schedule until after [place] is verified to be normal     Order Specific Question:   Is Patient Pregnant? Answer:   Unknown     Order Specific Question:   Reason for Exam     Answer:   cv risk    TSH REFLEX TO T4    AMB POC EKG ROUTINE W/ 12 LEADS, INTER & REP     Order Specific Question:   Reason for Exam:     Answer:   routine    traMADol (ULTRAM) 50 mg tablet        Plan: This is a 38 y.o. female with pmhx chronic migraine vertigo HLD is here for further evaluation and treatment of   Nonexertional Left sided cp radiating to left arm and neck associated with sob, started 2020. Went to [facility] urgent care 2020 with -ve cardiac work up. No records to review. Cardiac risk factors: HLD female postmenopausal elevated BMI former smoker family hx of CAD (mother stent late 45s and father   From heart attack mid 46s)    Atypical cp  Obtain echo, lexiscan (has underlying vertigo and walks with a cane)  Sublingual nitroglycerin as needed. She is already on aspirin, statin, and beta-blocker.   The patient knows to go to the ED if any progression of symptoms or symptoms not relieved immediately by rest/ NTG. If stress test is normal, and considering extreme hyperlipidemia and family history of CAD, I would be concerned about a false negative test.  Coronary calcium scoring would be reassuring if totally normal and threshold for further testing/treatment would be decreased if high- the patient wishes to proceed. To be scheduled by radiology after stress test is verified to be normal.    Familial HLD  1/2020 LDL at 259---started atorvastatin 20 mg  Lipids and labs followed by PCP. Will likely need pcsk9 inhibitor therapy  Check TSH reflex to T4 to rule out profound thyroid abnormality that could contribute to severe lipid disturbance      Tachycardia  Resolved with low BB     Chronic migraine, vertigo  bp nonorthostatic  Followed by dr Corby Arceo  On Xarelto 20 mg    Normal AAMIR 9/19  Right LE venous Doppler ve for DVT 9/19      Continue current care and f/u when testing complete.       Leslie Escalante MD

## 2020-01-30 NOTE — PROGRESS NOTES
1. Have you been to the ER, urgent care clinic since your last visit? Hospitalized since your last visit? [facility] ER  2 days ago for CP. 2. Have you seen or consulted any other health care providers outside of the [address] [place] [address] since your last visit? Include any pap smears or colon screening. No    Chief Complaint   Patient presents with    Chest Pain     NP, ref by Dr. Oliver Ahr. C/O constant CP, worse with exertion. C/O palps, SOB, dizziness.

## 2020-02-04 NOTE — ED PROVIDER NOTES
HPI   Patient is a 71-year-old white female with chief complaint of right lower leg and right foot pain and coolness to touch for 1 week. She states that she sprained her foot approximately 4 to 5 weeks ago and has been wearing an over-the-counter postop shoe because of the swelling. She denies any recent trauma or injury. She does not smoke or take birth control. She has a prior history of a left DVT. Denies any fever, shortness of breath, difficulty breathing or chest pain. She has not had any pain medicines today prior to arrival.  She is walking with crutches to avoid weightbearing.   Past Medical History:   Diagnosis Date    Anxiety     Asthma     Depression     Factor V deficiency ([facility]     Heart rate fast     Long term current use of anticoagulant therapy     Migraine     Thromboembolus ([facility] 2006    DVT left groin       Past Surgical History:   Procedure Laterality Date    ABDOMEN SURGERY PROC UNLISTED      laproscopy removed adhesions on colon    DILATION AND CURETTAGE      HX APPENDECTOMY      HX  SECTION      HX HERNIA REPAIR  2019     Sly Tesfaye incarcerated ventral hernia repair    HX HYSTERECTOMY  2018    HX ORTHOPAEDIC  2009    bone removed from foot    HX TUBAL LIGATION  c sec         Family History:   Problem Relation Age of Onset    Diabetes Mother     Alzheimer Mother     Hypertension Mother     High Cholesterol Mother     Headache Mother     Heart Disease Mother     Cancer Father     Headache Father     Heart Disease Father     Diabetes Sister     No Known Problems Brother     Immunodeficiency Sister     No Known Problems Sister     Cancer Maternal Uncle     Cancer Maternal Grandfather        Social History     Socioeconomic History    Marital status:      Spouse name: Not on file    Number of children: Not on file    Years of education: Not on file    Highest education level: Not on file   Occupational History    Not on file Social Needs    Financial resource strain: Not on file    Food insecurity:     Worry: Not on file     Inability: Not on file    Transportation needs:     Medical: Not on file     Non-medical: Not on file   Tobacco Use    Smoking status: Former Smoker     Packs/day: 0.50     Years: 21.00     Pack years: 10.50     Types: Cigarettes     Last attempt to quit: 3/5/2016     Years since quitting: 3.5    Smokeless tobacco: Never Used   Substance and Sexual Activity    Alcohol use: Yes     Alcohol/week: 0.0 standard drinks     Comment: Occ    Drug use: No    Sexual activity: Yes     Partners: Male     Birth control/protection: IUD   Lifestyle    Physical activity:     Days per week: Not on file     Minutes per session: Not on file    Stress: Not on file   Relationships    Social connections:     Talks on phone: Not on file     Gets together: Not on file     Attends Christian service: Not on file     Active member of club or organization: Not on file     Attends meetings of clubs or organizations: Not on file     Relationship status: Not on file    Intimate partner violence:     Fear of current or ex partner: Not on file     Emotionally abused: Not on file     Physically abused: Not on file     Forced sexual activity: Not on file   Other Topics Concern    Not on file   Social History Narrative    ** Merged History Encounter **              ALLERGIES: Adhesive tape-silicones    Review of Systems   Constitutional: Negative for activity change, appetite change, fever and unexpected weight change. HENT: Negative for congestion and trouble swallowing. Eyes: Negative for visual disturbance. Respiratory: Negative for cough and shortness of breath. Cardiovascular: Negative for chest pain, palpitations and leg swelling. Gastrointestinal: Negative for abdominal pain, nausea and vomiting. Genitourinary: Negative for dysuria and hematuria. Musculoskeletal: Positive for gait problem.  Negative for arthralgias and back pain. Pt using crutches to avoid weightbearing   Skin: Negative for rash. Neurological: Negative for dizziness and light-headedness. All other systems reviewed and are negative. Vitals:    09/19/19 1136   BP: 110/66   Pulse: 88   Resp: 18   Temp: 97.8 °F (36.6 °C)   SpO2: 97%            Physical Exam   Constitutional: She is oriented to person, place, and time. White female; non smoker;    HENT:   Right Ear: External ear normal.   Left Ear: External ear normal.   Mouth/Throat: Oropharynx is clear and moist.   Neck: Normal range of motion. Neck supple. Cardiovascular: Normal rate and regular rhythm. Pulmonary/Chest: Effort normal and breath sounds normal.   Abdominal: Soft. Bowel sounds are normal.   Musculoskeletal: She exhibits tenderness. Right lower leg and foot are cool to touch; with a bluish tinge pedal pulses are palpable;Skin integrity is intact. There is no obvious bony deformity, bruising or erythema. Good neurovascular sensation. No apparent tendon or nerve injury. Lymphadenopathy:     She has no cervical adenopathy. Neurological: She is alert and oriented to person, place, and time. Skin: Skin is warm and dry. Psychiatric: She has a normal mood and affect. Nursing note and vitals reviewed. MDM       Procedures      Patient has been reexamined and denies any complaints of pain. Encouraged her to elevate her leg when able. Recommend support hose/stockings with close follow-up. 5:25 PM  Patient's results and plan of care have been reviewed with her. Patient and/or family have verbally conveyed their understanding and agreement of the patient's signs, symptoms, diagnosis, treatment and prognosis and additionally agree to follow up as recommended or return to the Emergency Room should her condition change prior to follow-up.   Discharge instructions have also been provided to the patient with some educational information regarding her diagnosis as well a list of reasons why she  would want to return to the ER prior to her follow-up appointment should her condition change. Mitchell Ibarra NP complains of pain/discomfort

## 2020-02-05 LAB — TSH SERPL DL<=0.005 MIU/L-ACNC: 2.66 UIU/ML (ref 0.45–4.5)

## 2020-02-07 ENCOUNTER — TELEPHONE (OUTPATIENT)
Dept: CARDIOLOGY CLINIC | Age: 45
End: 2020-02-07

## 2020-02-07 NOTE — TELEPHONE ENCOUNTER
Called patient to confirm Nuclear stress test for 02/11. Reviewed with patient the need to hold all caffeine containing food, beverages and medicines for 24 hours. Pt verbalized understanding.

## 2020-02-12 ENCOUNTER — TELEPHONE (OUTPATIENT)
Dept: CARDIOLOGY CLINIC | Age: 45
End: 2020-02-12

## 2020-02-12 DIAGNOSIS — G43.909 MIGRAINE SYNDROME: ICD-10-CM

## 2020-02-12 RX ORDER — SERTRALINE HYDROCHLORIDE 100 MG/1
TABLET, FILM COATED ORAL
Qty: 90 TAB | Refills: 0 | Status: SHIPPED | OUTPATIENT
Start: 2020-02-12 | End: 2022-02-01 | Stop reason: ALTCHOICE

## 2020-02-12 NOTE — TELEPHONE ENCOUNTER
[facility]  sent a 80 day fax request for patient's sertraline 100mg.          Last visit: 1/14/20  Next visit:3/10/20  Previous refill 12/8/19(30+3R)    Requested Prescriptions     Pending Prescriptions Disp Refills    sertraline (ZOLOFT) 100 mg tablet 90 Tab 0     Sig: TAKE 1 TABLET BY MOUTH ONCE DAILY

## 2020-02-12 NOTE — TELEPHONE ENCOUNTER
----- Message from Kelley Martinez NP sent at 2/12/2020  8:47 AM EST -----  Mindy,    Please call patient and inform that thyroid test is normal.  Continue with atorvastatin started last visit. Keep f/u with Dr. Deborah Polanco as scheduled. Will call back when other testing complete.     Thanks,  [employer]

## 2020-02-12 NOTE — PROGRESS NOTES
Mindy,    Please call patient and inform that thyroid test is normal.  Continue with atorvastatin started last visit. Keep f/u with Dr. Flor Nathan as scheduled. Will call back when other testing complete.     Thanks,  [employer]

## 2020-02-13 ENCOUNTER — TELEPHONE (OUTPATIENT)
Dept: FAMILY MEDICINE CLINIC | Age: 45
End: 2020-02-13

## 2020-02-13 NOTE — TELEPHONE ENCOUNTER
Jude Phillips  Female, 38 y.o., 1975  Weight:   156 lb 12.8 oz (71.1 kg)  MRN:   [id number]  Phone:   [phone] (M)  PCP:   Justen Mercer MD  Primary Cvg:   MIL[place]: [address] (HMO)  Last Appt With Me  None  Next Appt With Me  None  Next Appt  3/4/20 - Tejinder Peguero MD - [facility]  Non-Urgent Medical Question     [facility] Sent  2/12/2020  2:29 PM   Is there Mackenzie Qureshi way to get doctors note to be out until after I see Cardiologist and you for more blood work? My appointment is scheduled for March 10th. Headache and dizziness still going on. Still can not drive. Everytime I move my head everything spins. Please and thank you.     Encounter Messages     Read Composed From To  Subject   Y 2/12/2020  2:29 PM Umberto Lorenzo MD  Non-Urgent Medical Question

## 2020-02-19 ENCOUNTER — DOCUMENTATION ONLY (OUTPATIENT)
Dept: CARDIOLOGY CLINIC | Age: 45
End: 2020-02-19

## 2020-02-25 ENCOUNTER — DOCUMENTATION ONLY (OUTPATIENT)
Dept: NEUROLOGY | Age: 45
End: 2020-02-25

## 2020-02-28 ENCOUNTER — TELEPHONE (OUTPATIENT)
Dept: NEUROLOGY | Age: 45
End: 2020-02-28

## 2020-02-28 NOTE — TELEPHONE ENCOUNTER
Unable to proceed with Botox request for 3/27/20 appt - card not on file. Mahesh Davis,     Please notify patient we need her insurance card on file to proceed with p.A. She can fax it in (front and back) or bring us a copy in the office.

## 2020-03-06 ENCOUNTER — TELEPHONE (OUTPATIENT)
Dept: NEUROLOGY | Age: 45
End: 2020-03-06

## 2020-03-12 ENCOUNTER — TELEPHONE (OUTPATIENT)
Dept: NEUROLOGY | Age: 45
End: 2020-03-12

## 2020-03-12 NOTE — TELEPHONE ENCOUNTER
Called pt on cell  To ask for a copy of her insurance card. She apologized and stated her mother had just  a few days ago. I explained how she can take a picture of her card front and back and email it to me using 'safemail' in the subject line. She will do that tonight. Advised if she has any problem w/ transmitting it, to call my dd number that will be listed on my email. She understood and appreciated the call.

## 2020-03-13 ENCOUNTER — TELEPHONE (OUTPATIENT)
Dept: NEUROLOGY | Age: 45
End: 2020-03-13

## 2020-03-13 NOTE — TELEPHONE ENCOUNTER
Rec'd insurance card. This Rosa plan is on the Exchange w/ a prefix of YTL - this was confirmed by Rosa we are not in network with this plan. Emailed patient today:     Kiana Pruitt,     When attempting to set up your prior authorization, Rosa advised us that this plan is on the Exchange. Unfortunately, [facility] does not participate with these [employer]. With that said, any appointments that you have with us, would be self pay. My recommendation would be to call [facility] [place] [facility] and find out what Neurologists are in the Exchange that you could establish in the future. I am so sorry we cannot accommodate you with this insurance plan. I wish you the very best and hopefully you will be able to get in with another practice that will accept your insurance. Thank you.

## 2020-03-19 DIAGNOSIS — J20.9 ACUTE BRONCHITIS WITH ASTHMA: ICD-10-CM

## 2020-03-19 DIAGNOSIS — J45.909 ACUTE BRONCHITIS WITH ASTHMA: ICD-10-CM

## 2020-03-20 DIAGNOSIS — J45.901 ASTHMATIC BRONCHITIS WITH ACUTE EXACERBATION, UNSPECIFIED ASTHMA SEVERITY, UNSPECIFIED WHETHER PERSISTENT: Primary | ICD-10-CM

## 2020-03-20 RX ORDER — ALBUTEROL SULFATE 90 UG/1
2 AEROSOL, METERED RESPIRATORY (INHALATION)
Qty: 1 INHALER | Refills: 3 | Status: SHIPPED | OUTPATIENT
Start: 2020-03-20 | End: 2021-05-16

## 2020-03-20 RX ORDER — PROMETHAZINE HYDROCHLORIDE AND DEXTROMETHORPHAN HYDROBROMIDE 6.25; 15 MG/5ML; MG/5ML
SYRUP ORAL
Qty: 180 ML | Refills: 1 | Status: SHIPPED | OUTPATIENT
Start: 2020-03-20 | End: 2020-04-10

## 2020-03-20 NOTE — TELEPHONE ENCOUNTER
Last Visit: 01/14/2020 with MD Ángel Recinos  Next Appointment: noted to f/u in 2 months; Pt canceled 03/10 appt  Previous Refill Encounter(s): 12/04/2017 per MD Ángel Recinos 1 inhaler 3R    Requested Prescriptions     Pending Prescriptions Disp Refills    albuterol (Proventil HFA) 90 mcg/actuation inhaler 1 Inhaler 3     Sig: Take 2 Puffs by inhalation every four (4) hours as needed for Wheezing.

## 2020-03-24 DIAGNOSIS — J45.909 ACUTE BRONCHITIS WITH ASTHMA: Primary | ICD-10-CM

## 2020-03-24 DIAGNOSIS — J20.9 ACUTE BRONCHITIS WITH ASTHMA: Primary | ICD-10-CM

## 2020-03-24 RX ORDER — AZITHROMYCIN 250 MG/1
TABLET, FILM COATED ORAL
Qty: 6 TAB | Refills: 0 | Status: SHIPPED | OUTPATIENT
Start: 2020-03-24 | End: 2020-03-29

## 2020-03-24 RX ORDER — PREDNISONE 10 MG/1
10 TABLET ORAL 2 TIMES DAILY
Qty: 10 TAB | Refills: 0 | Status: SHIPPED | OUTPATIENT
Start: 2020-03-24 | End: 2022-02-01 | Stop reason: ALTCHOICE

## 2020-03-25 ENCOUNTER — TELEPHONE (OUTPATIENT)
Dept: FAMILY MEDICINE CLINIC | Age: 45
End: 2020-03-25

## 2020-03-25 DIAGNOSIS — F41.0 PANIC ATTACKS: ICD-10-CM

## 2020-03-25 DIAGNOSIS — F41.9 ANXIETY DISORDER, UNSPECIFIED TYPE: Primary | ICD-10-CM

## 2020-03-25 RX ORDER — ALPRAZOLAM 0.5 MG/1
0.5 TABLET ORAL
Qty: 90 TAB | Refills: 2 | Status: SHIPPED | OUTPATIENT
Start: 2020-03-25 | End: 2020-08-10

## 2020-03-25 NOTE — TELEPHONE ENCOUNTER
Jude Phillips  Female, 41 y.o., 1975  Weight:   156 lb 12.8 oz (71.1 kg)  MRN:   [id number]  Phone:   [phone] Mellisa Edwards  PCP:   Chato Mcdaniel MD  Primary Cvg:   M[place]: [address] (HMO)  Last Appt With Me  None  Next Appt With Me  None  Next Appt  3/27/20 - Ottoniel Mccloud MD - [address]  [id number]  Prescription Question     From  Adelaida Allen To  [facility] Nurse Pool Sent  3/25/2020 11:40 AM   You put me on up to 3 Alprazolam a day in January. [facility] filled a prescription for 2 a day and said that was the prescription you sent over. I'm not sure where the mix up happened but 2 a day is not helping me. I take one pill morning, afternoon, and bedtime. Is there anyway to fix this? Thank you for calling in the other 2 medicines yesterday. Prescription Question     Darin Phillips MD Just now (11:40 AM)         You put me on up to 3 Alprazolam a day in January. [facility] filled a prescription for 2 a day and said that was the prescription you sent over. I'm not sure where the mix up happened but 2 a day is not helping me. I take one pill morning, afternoon, and bedtime. Is there anyway to fix this? Thank you for calling in the other 2 medicines yesterday.              Encounter Messages     Read Composed From To  Subject   Y 3/25/2020 11:40 AM Aleksandar Mendez MD  Prescription Question

## 2020-03-26 NOTE — TELEPHONE ENCOUNTER
Spoke with patient states she will have to find another neurologist that Margaret Leo for her botox treatments.   Cancelled 3/27/2020 appointment

## 2020-03-27 ENCOUNTER — TELEPHONE (OUTPATIENT)
Dept: FAMILY MEDICINE CLINIC | Age: 45
End: 2020-03-27

## 2020-03-27 NOTE — TELEPHONE ENCOUNTER
Pt requesting a supriya back because she wants to be tested for the COVID-19. She states she has cough and her temperature is reading high on her thermometer with no numbers. She stated she does not know if the cough is bronchitis or the COVID-19. She can be reached at [phone].

## 2020-04-01 ENCOUNTER — TELEPHONE (OUTPATIENT)
Dept: FAMILY MEDICINE CLINIC | Age: 45
End: 2020-04-01

## 2020-04-01 DIAGNOSIS — J45.901 ASTHMATIC BRONCHITIS WITH ACUTE EXACERBATION, UNSPECIFIED ASTHMA SEVERITY, UNSPECIFIED WHETHER PERSISTENT: Primary | ICD-10-CM

## 2020-04-01 RX ORDER — PREDNISONE 10 MG/1
10 TABLET ORAL 2 TIMES DAILY
Qty: 10 TAB | Refills: 0 | Status: SHIPPED | OUTPATIENT
Start: 2020-04-01 | End: 2022-02-01 | Stop reason: ALTCHOICE

## 2020-04-01 RX ORDER — BENZONATATE 200 MG/1
200 CAPSULE ORAL
Qty: 30 CAP | Refills: 1 | Status: SHIPPED | OUTPATIENT
Start: 2020-04-01 | End: 2020-04-08

## 2020-04-06 DIAGNOSIS — G89.29 CHRONIC NONINTRACTABLE HEADACHE, UNSPECIFIED HEADACHE TYPE: ICD-10-CM

## 2020-04-06 DIAGNOSIS — R51.9 CHRONIC NONINTRACTABLE HEADACHE, UNSPECIFIED HEADACHE TYPE: ICD-10-CM

## 2020-04-06 DIAGNOSIS — M54.2 CERVICALGIA: ICD-10-CM

## 2020-04-06 RX ORDER — BUTALBITAL, ACETAMINOPHEN AND CAFFEINE 50; 325; 40 MG/1; MG/1; MG/1
TABLET ORAL
Qty: 60 TAB | Refills: 0 | Status: SHIPPED | OUTPATIENT
Start: 2020-04-06 | End: 2022-01-28 | Stop reason: SDUPTHER

## 2020-04-09 DIAGNOSIS — J45.901 ASTHMATIC BRONCHITIS WITH ACUTE EXACERBATION, UNSPECIFIED ASTHMA SEVERITY, UNSPECIFIED WHETHER PERSISTENT: ICD-10-CM

## 2020-04-10 RX ORDER — PROMETHAZINE HYDROCHLORIDE AND DEXTROMETHORPHAN HYDROBROMIDE 6.25; 15 MG/5ML; MG/5ML
SYRUP ORAL
Qty: 180 ML | Refills: 1 | Status: SHIPPED | OUTPATIENT
Start: 2020-04-10 | End: 2020-10-07

## 2020-04-15 ENCOUNTER — TELEPHONE (OUTPATIENT)
Dept: FAMILY MEDICINE CLINIC | Age: 45
End: 2020-04-15

## 2020-05-13 RX ORDER — METOPROLOL TARTRATE 25 MG/1
12.5 TABLET, FILM COATED ORAL 2 TIMES DAILY
Qty: 90 TAB | Refills: 1 | Status: SHIPPED | OUTPATIENT
Start: 2020-05-13 | End: 2020-05-13 | Stop reason: SDUPTHER

## 2020-05-13 RX ORDER — METOPROLOL TARTRATE 25 MG/1
25 TABLET, FILM COATED ORAL 2 TIMES DAILY
Qty: 180 TAB | Refills: 1 | Status: SHIPPED | OUTPATIENT
Start: 2020-05-13 | End: 2020-10-23

## 2020-05-13 NOTE — TELEPHONE ENCOUNTER
Jude Phillips  Female, 41 y.o., 1975  Weight:   156 lb 12.8 oz (71.1 kg)  MRN:   [id number]  Phone:   [phone] (M)  PCP:   Shala Chery MD  Primary Cvg:   [place]: [address] (HMO)  Last Appt With Me  None  Next Appt With Me  None  Next Appt  None  Prescription Question     From  Bakari Hall To  [facility] Nurse Pool Sent  5/13/2020  2:42 PM   My metoprolol I started taking 25mg two times a day to control heart rate. Waiting to see Cardiologist but I keep a close eye on it. Prescription Question     Amor Phillips MD 28 minutes ago (2:42 PM)         My metoprolol I started taking 25mg two times a day to control heart rate. Waiting to see Cardiologist but I keep a close eye on it.              Attachments     [id number]   [id number].[id number]   Encounter Messages     Read Composed From To  Subject   Y 5/13/2020  2:42 PM Benji Venegas MD  Prescription Question

## 2020-07-24 ENCOUNTER — DOCUMENTATION ONLY (OUTPATIENT)
Dept: FAMILY MEDICINE CLINIC | Age: 45
End: 2020-07-24

## 2020-08-09 DIAGNOSIS — E78.2 MIXED HYPERLIPIDEMIA: ICD-10-CM

## 2020-08-09 RX ORDER — ATORVASTATIN CALCIUM 20 MG/1
TABLET, FILM COATED ORAL
Qty: 90 TAB | Refills: 2 | Status: SHIPPED | OUTPATIENT
Start: 2020-08-09 | End: 2022-10-11 | Stop reason: SDUPTHER

## 2020-08-10 DIAGNOSIS — F41.9 ANXIETY DISORDER, UNSPECIFIED TYPE: ICD-10-CM

## 2020-08-10 DIAGNOSIS — F41.0 PANIC ATTACKS: ICD-10-CM

## 2020-08-10 RX ORDER — ALPRAZOLAM 0.5 MG/1
TABLET ORAL
Qty: 90 TAB | Refills: 2 | Status: SHIPPED | OUTPATIENT
Start: 2020-08-10 | End: 2022-02-01 | Stop reason: ALTCHOICE

## 2020-10-07 DIAGNOSIS — J45.901 ASTHMATIC BRONCHITIS WITH ACUTE EXACERBATION, UNSPECIFIED ASTHMA SEVERITY, UNSPECIFIED WHETHER PERSISTENT: ICD-10-CM

## 2020-10-07 RX ORDER — PROMETHAZINE HYDROCHLORIDE AND DEXTROMETHORPHAN HYDROBROMIDE 6.25; 15 MG/5ML; MG/5ML
SYRUP ORAL
Qty: 180 ML | Refills: 0 | Status: SHIPPED | OUTPATIENT
Start: 2020-10-07 | End: 2022-02-01 | Stop reason: ALTCHOICE

## 2020-10-22 ENCOUNTER — HOSPITAL ENCOUNTER (EMERGENCY)
Age: 45
Discharge: HOME | End: 2020-10-22
Attending: EMERGENCY MEDICINE | Admitting: EMERGENCY MEDICINE
Payer: COMMERCIAL

## 2020-10-22 ENCOUNTER — APPOINTMENT (OUTPATIENT)
Dept: GENERAL RADIOLOGY | Age: 45
End: 2020-10-22
Attending: EMERGENCY MEDICINE
Payer: COMMERCIAL

## 2020-10-22 VITALS
HEART RATE: 110 BPM | WEIGHT: 161 LBS | TEMPERATURE: 98.1 F | RESPIRATION RATE: 24 BRPM | OXYGEN SATURATION: 98 % | HEIGHT: 61 IN | BODY MASS INDEX: 30.4 KG/M2 | DIASTOLIC BLOOD PRESSURE: 63 MMHG | SYSTOLIC BLOOD PRESSURE: 107 MMHG

## 2020-10-22 DIAGNOSIS — R05.9 COUGH: Primary | ICD-10-CM

## 2020-10-22 DIAGNOSIS — J45.41 MODERATE PERSISTENT ASTHMA WITH ACUTE EXACERBATION: ICD-10-CM

## 2020-10-22 LAB
ALBUMIN SERPL-MCNC: 3.5 G/DL (ref 3.5–5)
ALBUMIN/GLOB SERPL: 0.9 {RATIO} (ref 1.1–2.2)
ALP SERPL-CCNC: 97 U/L (ref 45–117)
ALT SERPL-CCNC: 31 U/L (ref 12–78)
ANION GAP SERPL CALC-SCNC: 7 MMOL/L (ref 5–15)
AST SERPL-CCNC: 19 U/L (ref 15–37)
BASOPHILS # BLD: 0 K/UL (ref 0–0.1)
BASOPHILS NFR BLD: 0 % (ref 0–1)
BILIRUB SERPL-MCNC: 0.3 MG/DL (ref 0.2–1)
BUN SERPL-MCNC: 14 MG/DL (ref 6–20)
BUN/CREAT SERPL: 13 (ref 12–20)
CALCIUM SERPL-MCNC: 9 MG/DL (ref 8.5–10.1)
CHLORIDE SERPL-SCNC: 111 MMOL/L (ref 97–108)
CO2 SERPL-SCNC: 21 MMOL/L (ref 21–32)
COMMENT, HOLDF: NORMAL
CREAT SERPL-MCNC: 1.12 MG/DL (ref 0.55–1.02)
DIFFERENTIAL METHOD BLD: NORMAL
EOSINOPHIL # BLD: 0.2 K/UL (ref 0–0.4)
EOSINOPHIL NFR BLD: 3 % (ref 0–7)
ERYTHROCYTE [DISTWIDTH] IN BLOOD BY AUTOMATED COUNT: 13.2 % (ref 11.5–14.5)
GLOBULIN SER CALC-MCNC: 3.7 G/DL (ref 2–4)
GLUCOSE SERPL-MCNC: 99 MG/DL (ref 65–100)
HCT VFR BLD AUTO: 43.5 % (ref 35–47)
HGB BLD-MCNC: 14.3 G/DL (ref 11.5–16)
IMM GRANULOCYTES # BLD AUTO: 0 K/UL (ref 0–0.04)
IMM GRANULOCYTES NFR BLD AUTO: 0 % (ref 0–0.5)
LYMPHOCYTES # BLD: 2.5 K/UL (ref 0.8–3.5)
LYMPHOCYTES NFR BLD: 33 % (ref 12–49)
MCH RBC QN AUTO: 31.5 PG (ref 26–34)
MCHC RBC AUTO-ENTMCNC: 32.9 G/DL (ref 30–36.5)
MCV RBC AUTO: 95.8 FL (ref 80–99)
MONOCYTES # BLD: 0.4 K/UL (ref 0–1)
MONOCYTES NFR BLD: 5 % (ref 5–13)
NEUTS SEG # BLD: 4.5 K/UL (ref 1.8–8)
NEUTS SEG NFR BLD: 59 % (ref 32–75)
NRBC # BLD: 0 K/UL (ref 0–0.01)
NRBC BLD-RTO: 0 PER 100 WBC
PLATELET # BLD AUTO: 196 K/UL (ref 150–400)
PMV BLD AUTO: 10.8 FL (ref 8.9–12.9)
POTASSIUM SERPL-SCNC: 3.5 MMOL/L (ref 3.5–5.1)
PROT SERPL-MCNC: 7.2 G/DL (ref 6.4–8.2)
RBC # BLD AUTO: 4.54 M/UL (ref 3.8–5.2)
SAMPLES BEING HELD,HOLD: NORMAL
SODIUM SERPL-SCNC: 139 MMOL/L (ref 136–145)
WBC # BLD AUTO: 7.6 K/UL (ref 3.6–11)

## 2020-10-22 PROCEDURE — 74011250637 HC RX REV CODE- 250/637: Performed by: EMERGENCY MEDICINE

## 2020-10-22 PROCEDURE — 96365 THER/PROPH/DIAG IV INF INIT: CPT

## 2020-10-22 PROCEDURE — 36415 COLL VENOUS BLD VENIPUNCTURE: CPT

## 2020-10-22 PROCEDURE — 85025 COMPLETE CBC W/AUTO DIFF WBC: CPT

## 2020-10-22 PROCEDURE — 94644 CONT INHLJ TX 1ST HOUR: CPT

## 2020-10-22 PROCEDURE — 96375 TX/PRO/DX INJ NEW DRUG ADDON: CPT

## 2020-10-22 PROCEDURE — 80053 COMPREHEN METABOLIC PANEL: CPT

## 2020-10-22 PROCEDURE — 99285 EMERGENCY DEPT VISIT HI MDM: CPT

## 2020-10-22 PROCEDURE — 74011250636 HC RX REV CODE- 250/636: Performed by: EMERGENCY MEDICINE

## 2020-10-22 PROCEDURE — 94645 CONT INHLJ TX EACH ADDL HOUR: CPT

## 2020-10-22 PROCEDURE — 74011000250 HC RX REV CODE- 250: Performed by: EMERGENCY MEDICINE

## 2020-10-22 PROCEDURE — 71045 X-RAY EXAM CHEST 1 VIEW: CPT

## 2020-10-22 RX ORDER — ACETAMINOPHEN AND CODEINE PHOSPHATE 300; 30 MG/1; MG/1
1 TABLET ORAL
Status: COMPLETED | OUTPATIENT
Start: 2020-10-22 | End: 2020-10-22

## 2020-10-22 RX ORDER — PROMETHAZINE HYDROCHLORIDE AND DEXTROMETHORPHAN HYDROBROMIDE 6.25; 15 MG/5ML; MG/5ML
2.5 SYRUP ORAL
Qty: 105 ML | Refills: 0 | Status: SHIPPED | OUTPATIENT
Start: 2020-10-22 | End: 2020-10-29

## 2020-10-22 RX ORDER — MAGNESIUM SULFATE HEPTAHYDRATE 40 MG/ML
2 INJECTION, SOLUTION INTRAVENOUS ONCE
Status: COMPLETED | OUTPATIENT
Start: 2020-10-22 | End: 2020-10-22

## 2020-10-22 RX ORDER — PREDNISONE 50 MG/1
50 TABLET ORAL DAILY
Qty: 4 TAB | Refills: 0 | Status: SHIPPED | OUTPATIENT
Start: 2020-10-22 | End: 2020-10-26

## 2020-10-22 RX ORDER — ALBUTEROL SULFATE 2.5 MG/.5ML
10 SOLUTION RESPIRATORY (INHALATION) CONTINUOUS
Status: DISCONTINUED | OUTPATIENT
Start: 2020-10-22 | End: 2020-10-22 | Stop reason: HOSPADM

## 2020-10-22 RX ORDER — KETOROLAC TROMETHAMINE 30 MG/ML
15 INJECTION, SOLUTION INTRAMUSCULAR; INTRAVENOUS ONCE
Status: COMPLETED | OUTPATIENT
Start: 2020-10-22 | End: 2020-10-22

## 2020-10-22 RX ADMIN — MAGNESIUM SULFATE IN WATER 2 G: 40 INJECTION, SOLUTION INTRAVENOUS at 12:07

## 2020-10-22 RX ADMIN — KETOROLAC TROMETHAMINE 15 MG: 30 INJECTION, SOLUTION INTRAMUSCULAR at 12:06

## 2020-10-22 RX ADMIN — ACETAMINOPHEN AND CODEINE PHOSPHATE 1 TABLET: 300; 30 TABLET ORAL at 12:06

## 2020-10-22 RX ADMIN — METHYLPREDNISOLONE SODIUM SUCCINATE 125 MG: 125 INJECTION, POWDER, FOR SOLUTION INTRAMUSCULAR; INTRAVENOUS at 12:06

## 2020-10-22 RX ADMIN — ALBUTEROL SULFATE 10 MG: 2.5 SOLUTION RESPIRATORY (INHALATION) at 12:15

## 2020-10-22 NOTE — ED PROVIDER NOTES
EMERGENCY DEPARTMENT HISTORY AND PHYSICAL EXAM      Date: 10/22/2020  Patient Name: Jasmin Metz    History of Presenting Illness     Chief Complaint   Patient presents with    Wheezing     coughing since this morning. Hx of intubation with asthma exacerbation. History Provided By: Patient    HPI: Jasmin Metz, 41 y.o. female presents to the ED with cc of wheezing. Patient has a history of asthma, previously treated for bronchitis with steroids, azithromycin and symptom relief. Patient uses Symbicort and inhaler for asthma. Reports she completed the oral steroids today. Patient reports continued cough, no hemoptysis. Reports a sharp chest pain with coughing. Reports also exacerbates her chronic back pain for which she had recent injections. Patient denies fevers or sick contacts. Denies hemoptysis. Denies abdominal pain, nausea, vomiting or diarrhea. Patient has been admitted in intubated in the past including the ICU. There are no other complaints, changes, or physical findings at this time. PCP: Cathi Loving MD    No current facility-administered medications on file prior to encounter. Current Outpatient Medications on File Prior to Encounter   Medication Sig Dispense Refill    promethazine-dextromethorphan (PROMETHAZINE-DM) 6.25-15 mg/5 mL syrup TAKE 5 MILLILITERS BY MOUTH EVERY 4 HOURS AS NEEDED FOR COUGH FOR UP TO SEVEN DAYS 180 mL 0    ALPRAZolam (XANAX) 0.5 mg tablet TAKE 1 TABLET BY MOUTH 3 TIMES DAILY AS NEEDED FOR ANXIETY. MAX DAILY AMOUNT: 3 TABLETS 90 Tab 2    atorvastatin (LIPITOR) 20 mg tablet TAKE 1 TABLET BY MOUTH EVERY DAY 90 Tab 2    metoprolol tartrate (LOPRESSOR) 25 mg tablet Take 1 Tab by mouth two (2) times a day.  180 Tab 1    nortriptyline (PAMELOR) 50 mg capsule 50 mg po qhs 60 Cap 0    butalbital-acetaminophen-caffeine (FIORICET, ESGIC) -40 mg per tablet TAKE 1 TABLET BY MOUTH EVERY 6 HOURS AS NEEDED FOR PAIN 60 Tab 0    predniSONE (DELTASONE) 10 mg tablet Take 10 mg by mouth two (2) times a day. 10 Tab 0    predniSONE (DELTASONE) 10 mg tablet Take 10 mg by mouth two (2) times a day. 10 Tab 0    ALPRAZolam (XANAX) 0.5 mg tablet TAKE 1 TABLET BY MOUTH TWICE A DAY AS NEEDED. MAX 2 TABS/DAY 60 Tab 3    albuterol (Proventil HFA) 90 mcg/actuation inhaler Take 2 Puffs by inhalation every four (4) hours as needed for Wheezing. 1 Inhaler 3    sertraline (ZOLOFT) 100 mg tablet TAKE 1 TABLET BY MOUTH ONCE DAILY 90 Tab 0    traMADol (ULTRAM) 50 mg tablet       rizatriptan (MAXALT-MLT) 10 mg disintegrating tablet Take 1 at onset of severe headache; may repeat another dose in 2 hours if headaches persist 9 Tab 3    rivaroxaban (XARELTO) 20 mg tab tablet Take 1 Tab by mouth daily (with breakfast). 30 Tab 11    onabotulinumtoxinA (BOTOX) 200 unit injection Inject 200 units by IM route to 31 FDA approved sites in head and neck every 12 weeks 1 Vial 3    ondansetron (ZOFRAN ODT) 4 mg disintegrating tablet Take 1 Tab by mouth every eight (8) hours as needed for Nausea. 30 Tab 2    ascorbic acid, vitamin C, (VITAMIN C) 100 mg tab Take 1 Tab by mouth two (2) times a day. (Patient taking differently: Take 100 mg by mouth daily.) 60 Tab 0    omeprazole (PRILOSEC) 20 mg capsule Take 20 mg by mouth daily.  acetaminophen (TYLENOL EXTRA STRENGTH) 500 mg tablet Take 1,000 mg by mouth every six (6) hours as needed.  albuterol-ipratropium (DUO-NEB) 2.5 mg-0.5 mg/3 ml nebu USE 1 VIAL VIA NEBULIZER EVERY 6 HOURS AS NEEDED 180 mL 0    chlorpheniramine-HYDROcodone (TUSSIONEX) 10-8 mg/5 mL suspension Take 5 mL by mouth every twelve (12) hours as needed for Cough. Max Daily Amount: 10 mL. 115 mL 0    aspirin 81 mg chewable tablet Take 81 mg by mouth daily.          Past History     Past Medical History:  Past Medical History:   Diagnosis Date    Anxiety     Asthma     Depression     [facility] deficiency [facility] [address]     Heart rate fast     Long term current use of anticoagulant therapy     Migraine     Thromboembolus ([facility] [address] 2006    DVT left groin       Past Surgical History:  Past Surgical History:   Procedure Laterality Date    ABDOMEN SURGERY PROC UNLISTED      laproscopy removed adhesions on colon    DILATION AND CURETTAGE      HX APPENDECTOMY      HX  SECTION      HX HERNIA REPAIR  2019     Alessia Orozco incarcerated ventral hernia repair    HX HYSTERECTOMY  2018    HX ORTHOPAEDIC  2009    bone removed from foot    HX TUBAL LIGATION  c sec       Family History:  Family History   Problem Relation Age of Onset    Diabetes Mother     Alzheimer Mother     Hypertension Mother     High Cholesterol Mother     Headache Mother     Heart Disease Mother     Cancer Father     Headache Father     Heart Disease Father     Diabetes Sister     No Known Problems Brother     Immunodeficiency Sister     No Known Problems Sister     Cancer Maternal Uncle     Cancer Maternal Grandfather        Social History:  Social History     Tobacco Use    Smoking status: Former Smoker     Packs/day: 0.50     Years: 21.00     Pack years: 10.50     Types: Cigarettes     Last attempt to quit: 3/5/2016     Years since quittin.6    Smokeless tobacco: Never Used   Substance Use Topics    Alcohol use: Not Currently     Alcohol/week: 0.0 standard drinks     Comment: Occ    Drug use: No       Allergies: Allergies   Allergen Reactions    Adhesive Tape-Silicones Other (comments)     Pt states this burns her skin. Review of Systems   Review of Systems   Constitutional: Negative for activity change, chills and fever. HENT: Negative for facial swelling and voice change. Eyes: Negative for redness. Respiratory: Positive for cough, shortness of breath and wheezing. Cardiovascular: Positive for chest pain. Negative for leg swelling. Gastrointestinal: Negative for abdominal pain, diarrhea, nausea and vomiting.    Genitourinary: Negative for decreased urine volume. Musculoskeletal: Positive for back pain. Negative for gait problem. Skin: Negative for pallor and rash. Neurological: Negative for tremors and facial asymmetry. Psychiatric/Behavioral: Negative for agitation. All other systems reviewed and are negative. Physical Exam   Physical Exam  Vitals signs and nursing note reviewed. Constitutional:       Comments: 71-year-old female, resting in bed, mild increased work of breathing   HENT:      Head: Normocephalic and atraumatic. Neck:      Musculoskeletal: Normal range of motion. Cardiovascular:      Rate and Rhythm: Regular rhythm. Tachycardia present. Heart sounds: No murmur. No friction rub. No gallop. Pulmonary:      Effort: Respiratory distress present. Breath sounds: Wheezing present. Abdominal:      Palpations: Abdomen is soft. Tenderness: There is no abdominal tenderness. Musculoskeletal: Normal range of motion. Skin:     General: Skin is warm. Capillary Refill: Capillary refill takes less than 2 seconds. Neurological:      General: No focal deficit present. Mental Status: She is alert. Psychiatric:         Mood and Affect: Mood normal.         Diagnostic Study Results     Labs -     Recent Results (from the past 12 hour(s))   SAMPLES BEING HELD    Collection Time: 10/22/20 11:45 AM   Result Value Ref Range    SAMPLES BEING HELD  RED, BLUE     COMMENT        Add-on orders for these samples will be processed based on acceptable specimen integrity and analyte stability, which may vary by analyte.    CBC WITH AUTOMATED DIFF    Collection Time: 10/22/20 11:45 AM   Result Value Ref Range    WBC 7.6 3.6 - 11.0 K/uL    RBC 4.54 3.80 - 5.20 M/uL    HGB 14.3 11.5 - 16.0 g/dL    HCT 43.5 35.0 - 47.0 %    MCV 95.8 80.0 - 99.0 FL    MCH 31.5 26.0 - 34.0 PG    MCHC 32.9 30.0 - 36.5 g/dL    RDW 13.2 11.5 - 14.5 %    PLATELET 479 968 - 455 K/uL    MPV 10.8 8.9 - 12.9 FL    NRBC 0.0 0  WBC ABSOLUTE NRBC 0.00 0.00 - 0.01 K/uL    NEUTROPHILS 59 32 - 75 %    LYMPHOCYTES 33 12 - 49 %    MONOCYTES 5 5 - 13 %    EOSINOPHILS 3 0 - 7 %    BASOPHILS 0 0 - 1 %    IMMATURE GRANULOCYTES 0 0.0 - 0.5 %    ABS. NEUTROPHILS 4.5 1.8 - 8.0 K/UL    ABS. LYMPHOCYTES 2.5 0.8 - 3.5 K/UL    ABS. MONOCYTES 0.4 0.0 - 1.0 K/UL    ABS. EOSINOPHILS 0.2 0.0 - 0.4 K/UL    ABS. BASOPHILS 0.0 0.0 - 0.1 K/UL    ABS. IMM. GRANS. 0.0 0.00 - 0.04 K/UL    DF AUTOMATED     METABOLIC PANEL, COMPREHENSIVE    Collection Time: 10/22/20 11:45 AM   Result Value Ref Range    Sodium 139 136 - 145 mmol/L    Potassium 3.5 3.5 - 5.1 mmol/L    Chloride 111 (H) 97 - 108 mmol/L    CO2 21 21 - 32 mmol/L    Anion gap 7 5 - 15 mmol/L    Glucose 99 65 - 100 mg/dL    BUN 14 6 - 20 MG/DL    Creatinine 1.12 (H) 0.55 - 1.02 MG/DL    BUN/Creatinine ratio 13 12 - 20      GFR est AA >60 >60 ml/min/1.73m2    GFR est non-AA 53 (L) >60 ml/min/1.73m2    Calcium 9.0 8.5 - 10.1 MG/DL    Bilirubin, total 0.3 0.2 - 1.0 MG/DL    ALT (SGPT) 31 12 - 78 U/L    AST (SGOT) 19 15 - 37 U/L    Alk. phosphatase 97 45 - 117 U/L    Protein, total 7.2 6.4 - 8.2 g/dL    Albumin 3.5 3.5 - 5.0 g/dL    Globulin 3.7 2.0 - 4.0 g/dL    A-G Ratio 0.9 (L) 1.1 - 2.2         Radiologic Studies -   XR CHEST PORT   Final Result   IMPRESSION: No Acute Disease. CT Results  (Last 48 hours)    None        CXR Results  (Last 48 hours)               10/22/20 1213  XR CHEST PORT Final result    Impression:  IMPRESSION: No Acute Disease. Narrative:  EXAM: Portable CXR. 1154 hours. INDICATION: cough       FINDINGS:   The lungs appear clear. Heart is normal in size. There is no pulmonary edema. There is no evident pneumothorax, adenopathy or pleural effusion. Medical Decision Making   I am the first provider for this patient.     I reviewed the vital signs, available nursing notes, past medical history, past surgical history, family history and social history. Vital Signs-Reviewed the patient's vital signs. Patient Vitals for the past 12 hrs:   Temp Pulse Resp BP SpO2   10/22/20 1355 -- (!) 110 24 107/63 98 %   10/22/20 1330 -- (!) 107 21 105/81 98 %   10/22/20 1305 -- (!) 110 23 107/76 98 %   10/22/20 1230 -- (!) 120 20 (!) 110/52 97 %   10/22/20 1145 -- (!) 109 24 (!) 124/102 100 %   10/22/20 1110 98.1 °F (36.7 °C) 86 (!) 34 111/86 100 %       Records Reviewed: Nursing Notes, Old Medical Records and Previous Laboratory Studies    Provider Notes (Medical Decision Making):     81-year-old female with a past medical history of asthma recently treated for bronchitis presents emergency department with a chief complaint of wheezing. Patient is tachypneic with increased work of breathing. Frequently coughing. Diffuse wheezing. Suspect asthma exacerbation. Consider pneumonia, check chest x-ray. Doubt PE given wheezing and history. Doubt pneumothorax. Low suspicion for ACS or CHF. Plan continuous nebulizer, steroids and magnesium. Reassess. Disposition pending ED course. ED Course:   Initial assessment performed. The patients presenting problems have been discussed, and they are in agreement with the care plan formulated and outlined with them. I have encouraged them to ask questions as they arise throughout their visit. ED Course as of Oct 22 1619   Thu Oct 22, 2020   1338 Chest x-ray normal, labs unremarkable. [MB]   1406 Patient reassessed, remains with cough. Patient reassessed, mildly tachycardic, but saturating well with clear breath sounds bilaterally. No wheezing. Suspect tachycardia in setting of continuous albuterol. Patient declined Covid testing.    [MB]      ED Course User Index  [MB] Olga Wright MD     Updates as above, patient discharged with Phenergan cough syrup and additional steroids. Brian Otero MD      Disposition:    Reassessments as above. Labs and ED course reviewed.   Patient was discharged home and was provided strict return precautions. Patient to follow-up with PCP or specialist per discharge instructions or return to ED for worsening symptoms. DISCHARGE PLAN:  1. Discharge Medication List as of 10/22/2020  2:12 PM      START taking these medications    Details   ! ! predniSONE (DELTASONE) 50 mg tablet Take 1 Tab by mouth daily for 4 days. , Normal, Disp-4 Tab,R-0      !! promethazine-dextromethorphan (PROMETHAZINE-DM) 6.25-15 mg/5 mL syrup Take 2.5 mL by mouth every four (4) hours as needed for Cough for up to 7 days. , Normal, Disp-105 mL,R-0Ok to substitute common bottle size       !! - Potential duplicate medications found. Please discuss with provider. CONTINUE these medications which have NOT CHANGED    Details   ! ! promethazine-dextromethorphan (PROMETHAZINE-DM) 6.25-15 mg/5 mL syrup TAKE 5 MILLILITERS BY MOUTH EVERY 4 HOURS AS NEEDED FOR COUGH FOR UP TO SEVEN DAYS, Normal, Disp-180 mL,R-0      !! ALPRAZolam (XANAX) 0.5 mg tablet TAKE 1 TABLET BY MOUTH 3 TIMES DAILY AS NEEDED FOR ANXIETY. MAX DAILY AMOUNT: 3 TABLETS, Normal, Disp-90 Tab,R-2Not to exceed 3 additional fills before 09/21/2020      atorvastatin (LIPITOR) 20 mg tablet TAKE 1 TABLET BY MOUTH EVERY DAY, Normal, Disp-90 Tab,R-2      metoprolol tartrate (LOPRESSOR) 25 mg tablet Take 1 Tab by mouth two (2) times a day., Normal, Disp-180 Tab,R-1      nortriptyline (PAMELOR) 50 mg capsule 50 mg po qhs, Normal, Disp-60 Cap, R-0DX Code Needed  . butalbital-acetaminophen-caffeine (FIORICET, ESGIC) -40 mg per tablet TAKE 1 TABLET BY MOUTH EVERY 6 HOURS AS NEEDED FOR PAIN, Normal, Disp-60 Tab, R-0      !! predniSONE (DELTASONE) 10 mg tablet Take 10 mg by mouth two (2) times a day., Normal, Disp-10 Tab, R-0      !! predniSONE (DELTASONE) 10 mg tablet Take 10 mg by mouth two (2) times a day., Normal, Disp-10 Tab, R-0      !! ALPRAZolam (XANAX) 0.5 mg tablet TAKE 1 TABLET BY MOUTH TWICE A DAY AS NEEDED.  MAX 2 TABS/DAY, Print, Twrv-48 Tab, R-3Not to exceed 2 additional fills before 05/03/2020      albuterol (Proventil HFA) 90 mcg/actuation inhaler Take 2 Puffs by inhalation every four (4) hours as needed for Wheezing., Normal, Disp-1 Inhaler, R-3      sertraline (ZOLOFT) 100 mg tablet TAKE 1 TABLET BY MOUTH ONCE DAILY, Normal, Disp-90 Tab, R-0      traMADol (ULTRAM) 50 mg tablet Historical Med      rizatriptan (MAXALT-MLT) 10 mg disintegrating tablet Take 1 at onset of severe headache; may repeat another dose in 2 hours if headaches persist, Normal, Disp-9 Tab, R-3      rivaroxaban (XARELTO) 20 mg tab tablet Take 1 Tab by mouth daily (with breakfast). , Normal, Disp-30 Tab, R-11      onabotulinumtoxinA (BOTOX) 200 unit injection Inject 200 units by IM route to 31 FDA approved sites in head and neck every 12 weeks, Print, Disp-1 Vial, R-3      ondansetron (ZOFRAN ODT) 4 mg disintegrating tablet Take 1 Tab by mouth every eight (8) hours as needed for Nausea., Normal, Disp-30 Tab, R-2      ascorbic acid, vitamin C, (VITAMIN C) 100 mg tab Take 1 Tab by mouth two (2) times a day., Normal, Disp-60 Tab, R-0      omeprazole (PRILOSEC) 20 mg capsule Take 20 mg by mouth daily. , Historical Med      acetaminophen (TYLENOL EXTRA STRENGTH) 500 mg tablet Take 1,000 mg by mouth every six (6) hours as needed., Historical Med      albuterol-ipratropium (DUO-NEB) 2.5 mg-0.5 mg/3 ml nebu USE 1 VIAL VIA NEBULIZER EVERY 6 HOURS AS NEEDED, Normal, Disp-180 mL, R-0      chlorpheniramine-HYDROcodone (TUSSIONEX) 10-8 mg/5 mL suspension Take 5 mL by mouth every twelve (12) hours as needed for Cough. Max Daily Amount: 10 mL., Print, Disp-115 mL, R-0      aspirin 81 mg chewable tablet Take 81 mg by mouth daily. , Historical Med       !! - Potential duplicate medications found. Please discuss with provider.         2.   Follow-up Information     Follow up With Specialties Details Why Contact Info    Concepcion Hoffman MD Family Medicine In 2 days  [address] [place] [address] [address]  [phone]          3. Return to ED if worse     Diagnosis     Clinical Impression:   1. Cough    2. Moderate persistent asthma with acute exacerbation        Attestations:    Marjorie Padilla MD    Please note that this dictation was completed with [employer], the computer voice recognition software. Quite often unanticipated grammatical, syntax, homophones, and other interpretive errors are inadvertently transcribed by the computer software. Please disregard these errors. Please excuse any errors that have escaped final proofreading. Thank you.

## 2020-10-22 NOTE — DISCHARGE INSTRUCTIONS
You were seen in the ER for your symptoms. Please follow-up with your primary care doctor. Please continue to use the steroids. You can use the medication for cough. Return for worsening symptoms at any time.

## 2020-10-23 RX ORDER — METOPROLOL TARTRATE 25 MG/1
TABLET, FILM COATED ORAL
Qty: 180 TAB | Refills: 1 | Status: SHIPPED | OUTPATIENT
Start: 2020-10-23 | End: 2022-10-11 | Stop reason: SDUPTHER

## 2020-11-13 ENCOUNTER — TRANSCRIBE ORDER (OUTPATIENT)
Dept: SCHEDULING | Age: 45
End: 2020-11-13

## 2020-11-13 DIAGNOSIS — R29.898 WEAKNESS OF RIGHT LOWER EXTREMITY: ICD-10-CM

## 2020-11-13 DIAGNOSIS — M54.50 LUMBAR PAIN: Primary | ICD-10-CM

## 2020-11-13 DIAGNOSIS — M79.18 MYOFASCIAL PAIN: ICD-10-CM

## 2020-11-13 DIAGNOSIS — M54.31 RIGHT SIDED SCIATICA: ICD-10-CM

## 2020-11-13 DIAGNOSIS — M47.26 OSTEOARTHRITIS OF SPINE WITH RADICULOPATHY, LUMBAR REGION: ICD-10-CM

## 2021-02-03 ENCOUNTER — HOSPITAL ENCOUNTER (EMERGENCY)
Age: 46
Discharge: HOME | End: 2021-02-03
Attending: EMERGENCY MEDICINE
Payer: COMMERCIAL

## 2021-02-03 ENCOUNTER — APPOINTMENT (OUTPATIENT)
Dept: CT IMAGING | Age: 46
End: 2021-02-03
Attending: PHYSICIAN ASSISTANT
Payer: COMMERCIAL

## 2021-02-03 ENCOUNTER — APPOINTMENT (OUTPATIENT)
Dept: GENERAL RADIOLOGY | Age: 46
End: 2021-02-03
Attending: EMERGENCY MEDICINE
Payer: COMMERCIAL

## 2021-02-03 VITALS
TEMPERATURE: 97.7 F | BODY MASS INDEX: 33.23 KG/M2 | SYSTOLIC BLOOD PRESSURE: 134 MMHG | HEART RATE: 83 BPM | RESPIRATION RATE: 20 BRPM | HEIGHT: 61 IN | OXYGEN SATURATION: 98 % | WEIGHT: 176 LBS | DIASTOLIC BLOOD PRESSURE: 62 MMHG

## 2021-02-03 DIAGNOSIS — Z79.01 ANTICOAGULATED: ICD-10-CM
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
## 2021-02-03 DIAGNOSIS — S93.402A SPRAIN OF LEFT ANKLE, UNSPECIFIED LIGAMENT, INITIAL ENCOUNTER: Primary | ICD-10-CM

## 2021-02-03 PROCEDURE — 99283 EMERGENCY DEPT VISIT LOW MDM: CPT

## 2021-02-03 PROCEDURE — 74011250637 HC RX REV CODE- 250/637: Performed by: PHYSICIAN ASSISTANT

## 2021-02-03 PROCEDURE — 70450 CT HEAD/BRAIN W/O DYE: CPT

## 2021-02-03 PROCEDURE — 73610 X-RAY EXAM OF ANKLE: CPT

## 2021-02-03 PROCEDURE — 73630 X-RAY EXAM OF FOOT: CPT

## 2021-02-03 RX ORDER — IBUPROFEN 400 MG/1
800 TABLET ORAL ONCE
Status: DISCONTINUED | OUTPATIENT
Start: 2021-02-03 | End: 2021-02-03

## 2021-02-03 RX ORDER — OXYCODONE HYDROCHLORIDE 5 MG/1
5 TABLET ORAL
Status: COMPLETED | OUTPATIENT
Start: 2021-02-03 | End: 2021-02-03

## 2021-02-03 RX ADMIN — OXYCODONE 5 MG: 5 TABLET ORAL at 12:08

## 2021-02-03 NOTE — ED NOTES
Zoie MCCAULEY reviewed discharge instructions with the patient. The patient verbalized understanding. All questions and concerns were addressed. The patient declined a wheelchair and is discharged ambulatory in the care of family members with instructions and prescriptions in hand. Pt is alert and oriented x 4. Respirations are clear and unlabored.

## 2021-02-03 NOTE — ED PROVIDER NOTES
EMERGENCY DEPARTMENT HISTORY AND PHYSICAL EXAM      Date: 2/3/2021  Patient Name: Jamel Tadeo    History of Presenting Illness     Chief Complaint   Patient presents with    Foot Pain     Patient reports she fell on her L foot this am and is in pain. History Provided By: Patient    HPI: Jamel Tadeo, 41 y.o. female with PMHx of chronic pain, factor V leiden and afib on warfarin, depression/anxiety, asthma, presents BIB self to the ED with cc of left left foot and ankle pain in setting of fall that occurred this morning at 8:30 AM.  Patient states that her left ankle rolled outward, causing her to fall and strike the side of her head against her car. No LOC. Patient complains of severe pain at the left lateral foot extending to the left lateral ankle. She is unable to weight-bear or ambulate. She also complains of left-sided headache at site of impact against her car. Denies numbness, tingling, vision change, nausea, vomiting. No pain relief with Tylenol prior to arrival.      There are no other complaints, changes, or physical findings at this time. PCP: Caity Leach MD    No current facility-administered medications on file prior to encounter. Current Outpatient Medications on File Prior to Encounter   Medication Sig Dispense Refill    metoprolol tartrate (LOPRESSOR) 25 mg tablet TAKE 1 TABLET BY MOUTH TWICE A  Tab 1    promethazine-dextromethorphan (PROMETHAZINE-DM) 6.25-15 mg/5 mL syrup TAKE 5 MILLILITERS BY MOUTH EVERY 4 HOURS AS NEEDED FOR COUGH FOR UP TO SEVEN DAYS 180 mL 0    ALPRAZolam (XANAX) 0.5 mg tablet TAKE 1 TABLET BY MOUTH 3 TIMES DAILY AS NEEDED FOR ANXIETY.  MAX DAILY AMOUNT: 3 TABLETS 90 Tab 2    atorvastatin (LIPITOR) 20 mg tablet TAKE 1 TABLET BY MOUTH EVERY DAY 90 Tab 2    nortriptyline (PAMELOR) 50 mg capsule 50 mg po qhs 60 Cap 0    butalbital-acetaminophen-caffeine (FIORICET, ESGIC) -40 mg per tablet TAKE 1 TABLET BY MOUTH EVERY 6 HOURS AS NEEDED FOR PAIN 60 Tab 0    predniSONE (DELTASONE) 10 mg tablet Take 10 mg by mouth two (2) times a day. 10 Tab 0    predniSONE (DELTASONE) 10 mg tablet Take 10 mg by mouth two (2) times a day. 10 Tab 0    ALPRAZolam (XANAX) 0.5 mg tablet TAKE 1 TABLET BY MOUTH TWICE A DAY AS NEEDED. MAX 2 TABS/DAY 60 Tab 3    albuterol (Proventil HFA) 90 mcg/actuation inhaler Take 2 Puffs by inhalation every four (4) hours as needed for Wheezing. 1 Inhaler 3    sertraline (ZOLOFT) 100 mg tablet TAKE 1 TABLET BY MOUTH ONCE DAILY 90 Tab 0    traMADol (ULTRAM) 50 mg tablet       rizatriptan (MAXALT-MLT) 10 mg disintegrating tablet Take 1 at onset of severe headache; may repeat another dose in 2 hours if headaches persist 9 Tab 3    rivaroxaban (XARELTO) 20 mg tab tablet Take 1 Tab by mouth daily (with breakfast). 30 Tab 11    onabotulinumtoxinA (BOTOX) 200 unit injection Inject 200 units by IM route to 31 FDA approved sites in head and neck every 12 weeks 1 Vial 3    ondansetron (ZOFRAN ODT) 4 mg disintegrating tablet Take 1 Tab by mouth every eight (8) hours as needed for Nausea. 30 Tab 2    ascorbic acid, vitamin C, (VITAMIN C) 100 mg tab Take 1 Tab by mouth two (2) times a day. (Patient taking differently: Take 100 mg by mouth daily.) 60 Tab 0    omeprazole (PRILOSEC) 20 mg capsule Take 20 mg by mouth daily.  acetaminophen (TYLENOL EXTRA STRENGTH) 500 mg tablet Take 1,000 mg by mouth every six (6) hours as needed.  albuterol-ipratropium (DUO-NEB) 2.5 mg-0.5 mg/3 ml nebu USE 1 VIAL VIA NEBULIZER EVERY 6 HOURS AS NEEDED 180 mL 0    chlorpheniramine-HYDROcodone (TUSSIONEX) 10-8 mg/5 mL suspension Take 5 mL by mouth every twelve (12) hours as needed for Cough. Max Daily Amount: 10 mL. 115 mL 0    aspirin 81 mg chewable tablet Take 81 mg by mouth daily.          Past History     Past Medical History:  Past Medical History:   Diagnosis Date    Anxiety     Asthma     Depression     [facility]     Heart rate fast     Long term current use of anticoagulant therapy     Migraine     Thromboembolus ([address] 2006    DVT left groin       Past Surgical History:  Past Surgical History:   Procedure Laterality Date    ABDOMEN SURGERY PROC UNLISTED      laproscopy removed adhesions on colon    DILATION AND CURETTAGE      HX APPENDECTOMY  1996    HX  SECTION      HX HERNIA REPAIR  2019     Jaylen Delatorre incarcerated ventral hernia repair    HX HYSTERECTOMY  2018    HX ORTHOPAEDIC  2009    bone removed from foot    HX TUBAL LIGATION  c sec       Family History:  Family History   Problem Relation Age of Onset    Diabetes Mother     Alzheimer Mother     Hypertension Mother     High Cholesterol Mother     Headache Mother     Heart Disease Mother     Cancer Father     Headache Father     Heart Disease Father     Diabetes Sister     No Known Problems Brother     Immunodeficiency Sister     No Known Problems Sister     Cancer Maternal Uncle     Cancer Maternal Grandfather        Social History:  Social History     Tobacco Use    Smoking status: Former Smoker     Packs/day: 0.50     Years: 21.00     Pack years: 10.50     Types: Cigarettes     Quit date: 3/5/2016     Years since quittin.9    Smokeless tobacco: Never Used   Substance Use Topics    Alcohol use: Not Currently     Alcohol/week: 0.0 standard drinks     Comment: Occ    Drug use: No       Allergies: Allergies   Allergen Reactions    Adhesive Tape-Silicones Other (comments)     Pt states this burns her skin. Review of Systems   Review of Systems   Musculoskeletal: Positive for arthralgias. Neurological: Positive for headaches. Negative for syncope. Hematological: Bruises/bleeds easily. All other systems reviewed and are negative. Physical Exam   Physical Exam  Vitals signs and nursing note reviewed. Constitutional:       General: She is in acute distress (in pain, tearful).       Appearance: Normal appearance. She is well-developed. She is not toxic-appearing. HENT:      Head: Normocephalic and atraumatic. Right Ear: Tympanic membrane normal.      Left Ear: Tympanic membrane normal.      Nose: Nose normal.      Mouth/Throat:      Mouth: Mucous membranes are moist.   Eyes:      General: Lids are normal.      Extraocular Movements: Extraocular movements intact. Conjunctiva/sclera: Conjunctivae normal.      Pupils: Pupils are equal, round, and reactive to light. Neck:      Musculoskeletal: Normal range of motion and neck supple. No neck rigidity. Cardiovascular:      Rate and Rhythm: Normal rate and regular rhythm. Pulses:           Dorsalis pedis pulses are 2+ on the left side. Pulmonary:      Effort: Pulmonary effort is normal.      Breath sounds: Normal breath sounds. Abdominal:      Palpations: Abdomen is soft. Tenderness: There is no abdominal tenderness. There is no guarding. Musculoskeletal: Normal range of motion. General: No deformity. Left knee: Normal. She exhibits normal range of motion. No tenderness found. Left ankle: She exhibits no swelling. No lateral malleolus and no medial malleolus tenderness found. Achilles tendon normal.      Comments: TTP along entire length of spine, which pt states is her daily baseline. TTP over left fifth metatarsal and inferior to left lateral malleolus. Mild ecchymosis. No edema. Neurovascularly intact throughout. No bony tenderness noted elsewhere. Skin:     General: Skin is warm and dry. Neurological:      General: No focal deficit present. Mental Status: She is alert and oriented to person, place, and time. Psychiatric:         Mood and Affect: Mood normal.         Behavior: Behavior normal. Behavior is cooperative. Diagnostic Study Results     Labs -   No results found for this or any previous visit (from the past 12 hour(s)).     Radiologic Studies -   CT HEAD WO CONT   Final Result No acute traumatic injury. XR ANKLE LT MIN 3 V   Final Result   No acute abnormality. XR FOOT LT MIN 3 V   Final Result   No acute abnormality. CT Results  (Last 48 hours)               02/03/21 1306  CT HEAD WO CONT Final result    Impression:      No acute traumatic injury. Narrative:  INDICATION:   L sided pain, fall, on warfarin       EXAMINATION:  CT HEAD WO CONTRAST       COMPARISON:  MRI November 5, 2019       TECHNIQUE:  Routine noncontrast axial head CT was performed. Sagittal and   coronal reconstructions were generated. CT dose reduction was achieved through   use of a standardized protocol tailored for this examination and automatic   exposure control for dose modulation. FINDINGS:       Ventricles:  Midline, no hydrocephalus. Intracranial Hemorrhage:  None. Brain Parenchyma/Brainstem:  Normal for age. Basal Cisterns:  Normal.    Paranasal Sinuses:  Visualized sinuses are clear. Soft Tissues:  No significant soft tissue swelling. Osseous Structures:  No acute fracture. Additional Comments:  Slightly low lying cerebellar tonsils, 5 mm or less below   the foramen magnum without significant CSF crowding. CXR Results  (Last 48 hours)    None            Medical Decision Making   I am the first provider for this patient. I reviewed the vital signs, available nursing notes, past medical history, past surgical history, family history and social history. Vital Signs-Reviewed the patient's vital signs. Patient Vitals for the past 12 hrs:   Temp Pulse Resp BP SpO2   02/03/21 1208 -- -- -- -- 98 %   02/03/21 1159 97.7 °F (36.5 °C) 83 20 134/62 93 %       Records Reviewed: Nursing Notes    Provider Notes (Medical Decision Making):   Reviewed all study findings with patient. Her  is now at bedside. Ace wrap, crutches for foot and ankle. Advised on RICE treatment, Ortho follow-up.   She is an established patient with [facility]. ED return precautions given. Patient is in agreement with this plan. ED Course:   Initial assessment performed. The patients presenting problems have been discussed, and they are in agreement with the care plan formulated and outlined with them. I have encouraged them to ask questions as they arise throughout their visit. Critical Care Time: None    Disposition:  D/c    PLAN:  1. Current Discharge Medication List        2. Follow-up Information     Follow up With Specialties Details Why Contact Info    Charisma Alvarenga MD Family Medicine Call  For follow up [address]  [address] [address] [facility]   For follow up if needed Rodney Guerra  [address] [facility] [address]  [phone]        Return to ED if worse     Diagnosis     Clinical Impression:   1. Sprain of left ankle, unspecified ligament, initial encounter    2. Fall, initial encounter    3. Anticoagulated          Please note that this dictation was completed with [employer], the computer voice recognition software. Quite often unanticipated grammatical, syntax, homophones, and other interpretive errors are inadvertently transcribed by the computer software. Please disregards these errors. Please excuse any errors that have escaped final proofreading.

## 2021-02-12 RX ORDER — ONDANSETRON 4 MG/1
TABLET, FILM COATED ORAL
Qty: 48 TAB | Refills: 0 | Status: SHIPPED
Start: 2021-02-12 | End: 2022-02-01 | Stop reason: SDUPTHER

## 2021-05-02 ENCOUNTER — HOSPITAL ENCOUNTER (EMERGENCY)
Age: 46
Discharge: HOME | End: 2021-05-03
Attending: EMERGENCY MEDICINE | Admitting: EMERGENCY MEDICINE
Payer: COMMERCIAL

## 2021-05-02 DIAGNOSIS — E87.6 HYPOKALEMIA: ICD-10-CM

## 2021-05-02 DIAGNOSIS — U07.1 2019 NOVEL CORONAVIRUS DISEASE (COVID-19): ICD-10-CM

## 2021-05-02 DIAGNOSIS — R11.2 NAUSEA AND VOMITING, INTRACTABILITY OF VOMITING NOT SPECIFIED, UNSPECIFIED VOMITING TYPE: Primary | ICD-10-CM

## 2021-05-02 DIAGNOSIS — E86.0 DEHYDRATION: ICD-10-CM

## 2021-05-02 DIAGNOSIS — R74.8 ELEVATED LIVER ENZYMES: ICD-10-CM

## 2021-05-02 LAB
ALBUMIN SERPL-MCNC: 3.4 G/DL (ref 3.5–5)
ALBUMIN/GLOB SERPL: 0.8 {RATIO} (ref 1.1–2.2)
ALP SERPL-CCNC: 94 U/L (ref 45–117)
ALT SERPL-CCNC: 128 U/L (ref 12–78)
ANION GAP SERPL CALC-SCNC: 7 MMOL/L (ref 5–15)
AST SERPL-CCNC: 258 U/L (ref 15–37)
BASOPHILS # BLD: 0 K/UL (ref 0–0.1)
BASOPHILS NFR BLD: 0 % (ref 0–1)
BILIRUB SERPL-MCNC: 0.5 MG/DL (ref 0.2–1)
BUN SERPL-MCNC: 12 MG/DL (ref 6–20)
BUN/CREAT SERPL: 15 (ref 12–20)
CALCIUM SERPL-MCNC: 9.3 MG/DL (ref 8.5–10.1)
CHLORIDE SERPL-SCNC: 107 MMOL/L (ref 97–108)
CO2 SERPL-SCNC: 25 MMOL/L (ref 21–32)
CREAT SERPL-MCNC: 0.8 MG/DL (ref 0.55–1.02)
DIFFERENTIAL METHOD BLD: NORMAL
EOSINOPHIL # BLD: 0.1 K/UL (ref 0–0.4)
EOSINOPHIL NFR BLD: 1 % (ref 0–7)
ERYTHROCYTE [DISTWIDTH] IN BLOOD BY AUTOMATED COUNT: 12.8 % (ref 11.5–14.5)
GLOBULIN SER CALC-MCNC: 4.5 G/DL (ref 2–4)
GLUCOSE SERPL-MCNC: 113 MG/DL (ref 65–100)
HCT VFR BLD AUTO: 39.5 % (ref 35–47)
HGB BLD-MCNC: 13.9 G/DL (ref 11.5–16)
IMM GRANULOCYTES # BLD AUTO: 0 K/UL (ref 0–0.04)
IMM GRANULOCYTES NFR BLD AUTO: 0 % (ref 0–0.5)
LYMPHOCYTES # BLD: 1.1 K/UL (ref 0.8–3.5)
LYMPHOCYTES NFR BLD: 19 % (ref 12–49)
MCH RBC QN AUTO: 30.6 PG (ref 26–34)
MCHC RBC AUTO-ENTMCNC: 35.2 G/DL (ref 30–36.5)
MCV RBC AUTO: 87 FL (ref 80–99)
MONOCYTES # BLD: 0.3 K/UL (ref 0–1)
MONOCYTES NFR BLD: 6 % (ref 5–13)
NEUTS SEG # BLD: 4.2 K/UL (ref 1.8–8)
NEUTS SEG NFR BLD: 74 % (ref 32–75)
NRBC # BLD: 0 K/UL (ref 0–0.01)
NRBC BLD-RTO: 0 PER 100 WBC
PLATELET # BLD AUTO: 330 K/UL (ref 150–400)
PMV BLD AUTO: 10.4 FL (ref 8.9–12.9)
POTASSIUM SERPL-SCNC: 3.2 MMOL/L (ref 3.5–5.1)
PROT SERPL-MCNC: 7.9 G/DL (ref 6.4–8.2)
RBC # BLD AUTO: 4.54 M/UL (ref 3.8–5.2)
RBC MORPH BLD: NORMAL
SODIUM SERPL-SCNC: 139 MMOL/L (ref 136–145)
WBC # BLD AUTO: 5.7 K/UL (ref 3.6–11)

## 2021-05-02 PROCEDURE — 96374 THER/PROPH/DIAG INJ IV PUSH: CPT

## 2021-05-02 PROCEDURE — 96361 HYDRATE IV INFUSION ADD-ON: CPT

## 2021-05-02 PROCEDURE — 36415 COLL VENOUS BLD VENIPUNCTURE: CPT

## 2021-05-02 PROCEDURE — 85025 COMPLETE CBC W/AUTO DIFF WBC: CPT

## 2021-05-02 PROCEDURE — 80053 COMPREHEN METABOLIC PANEL: CPT

## 2021-05-02 PROCEDURE — 99285 EMERGENCY DEPT VISIT HI MDM: CPT

## 2021-05-02 PROCEDURE — 96375 TX/PRO/DX INJ NEW DRUG ADDON: CPT

## 2021-05-02 NOTE — Clinical Note
[address]  [facility] EMERGENCY DEPT  [address] [id number] [phone]    Work/School Note    Date: 5/2/2021     To Whom It May concern:    Carson Phillips was evaulated by the following provider(s):  Attending Provider: Lorri Mack MD.   COVID19 virus is suspected. Per the CDC guidelines we recommend home isolation until the following conditions are all met:    1. At least 10 days have passed since symptoms first appeared and  2. At least 24 hours have passed since last fever without the use of fever-reducing medications and  3.  Symptoms (e.g., cough, shortness of breath) have improved    Sincerely,          April Bharati Espitia MD

## 2021-05-02 NOTE — Clinical Note
[address]  [facility] EMERGENCY DEPT  [address] [place] [address]  [phone]    Work/School Note    Date: 5/2/2021    To Whom It May concern:    Margret Phillips was seen and treated today in the emergency room by the following provider(s):  Attending Provider: Fernanda Molina MD.      Margret Phillips is excused from work/school on 05/03/21 and 05/04/21. She is medically clear to return to work/school on 5/5/2021.        Sincerely,          Abelardo Rodriguez MD

## 2021-05-03 ENCOUNTER — APPOINTMENT (OUTPATIENT)
Dept: ULTRASOUND IMAGING | Age: 46
End: 2021-05-03
Attending: EMERGENCY MEDICINE
Payer: COMMERCIAL

## 2021-05-03 VITALS
DIASTOLIC BLOOD PRESSURE: 67 MMHG | SYSTOLIC BLOOD PRESSURE: 115 MMHG | RESPIRATION RATE: 23 BRPM | OXYGEN SATURATION: 95 % | HEART RATE: 87 BPM | TEMPERATURE: 99 F

## 2021-05-03 LAB
APPEARANCE UR: ABNORMAL
ATRIAL RATE: 88 BPM
BACTERIA URNS QL MICRO: NEGATIVE /HPF
BILIRUB UR QL CFM: NEGATIVE
CALCULATED P AXIS, ECG09: 49 DEGREES
CALCULATED R AXIS, ECG10: 75 DEGREES
CALCULATED T AXIS, ECG11: 55 DEGREES
CK MB CFR SERPL CALC: NORMAL % (ref 0–2.5)
CK MB SERPL-MCNC: <1 NG/ML (ref 5–25)
CK SERPL-CCNC: 57 U/L (ref 26–192)
COLOR UR: ABNORMAL
D DIMER PPP FEU-MCNC: <0.19 MG/L FEU (ref 0–0.65)
DIAGNOSIS, 93000: NORMAL
EPITH CASTS URNS QL MICRO: ABNORMAL /LPF
GLUCOSE UR STRIP.AUTO-MCNC: NEGATIVE MG/DL
HGB UR QL STRIP: ABNORMAL
HYALINE CASTS URNS QL MICRO: ABNORMAL /LPF (ref 0–5)
KETONES UR QL STRIP.AUTO: 15 MG/DL
LACTATE BLD-SCNC: 1 MMOL/L (ref 0.4–2)
LEUKOCYTE ESTERASE UR QL STRIP.AUTO: NEGATIVE
LIPASE SERPL-CCNC: 292 U/L (ref 73–393)
MAGNESIUM SERPL-MCNC: 2 MG/DL (ref 1.6–2.4)
NITRITE UR QL STRIP.AUTO: NEGATIVE
P-R INTERVAL, ECG05: 122 MS
PH UR STRIP: 6.5 [PH] (ref 5–8)
PROT UR STRIP-MCNC: 100 MG/DL
Q-T INTERVAL, ECG07: 374 MS
QRS DURATION, ECG06: 86 MS
QTC CALCULATION (BEZET), ECG08: 452 MS
RBC #/AREA URNS HPF: >100 /HPF (ref 0–5)
SP GR UR REFRACTOMETRY: 1.03 (ref 1–1.03)
TROPONIN I SERPL-MCNC: <0.05 NG/ML
UA: UC IF INDICATED,UAUC: ABNORMAL
UROBILINOGEN UR QL STRIP.AUTO: 1 EU/DL (ref 0.2–1)
VENTRICULAR RATE, ECG03: 88 BPM
WBC URNS QL MICRO: ABNORMAL /HPF (ref 0–4)

## 2021-05-03 PROCEDURE — 82550 ASSAY OF CK (CPK): CPT

## 2021-05-03 PROCEDURE — 74011250636 HC RX REV CODE- 250/636: Performed by: EMERGENCY MEDICINE

## 2021-05-03 PROCEDURE — 74011250637 HC RX REV CODE- 250/637: Performed by: EMERGENCY MEDICINE

## 2021-05-03 PROCEDURE — 76705 ECHO EXAM OF ABDOMEN: CPT

## 2021-05-03 PROCEDURE — 36415 COLL VENOUS BLD VENIPUNCTURE: CPT

## 2021-05-03 PROCEDURE — 81001 URINALYSIS AUTO W/SCOPE: CPT

## 2021-05-03 PROCEDURE — 83605 ASSAY OF LACTIC ACID: CPT

## 2021-05-03 PROCEDURE — 83690 ASSAY OF LIPASE: CPT

## 2021-05-03 PROCEDURE — 93005 ELECTROCARDIOGRAM TRACING: CPT

## 2021-05-03 PROCEDURE — 84484 ASSAY OF TROPONIN QUANT: CPT

## 2021-05-03 PROCEDURE — 83735 ASSAY OF MAGNESIUM: CPT

## 2021-05-03 PROCEDURE — 85379 FIBRIN DEGRADATION QUANT: CPT

## 2021-05-03 RX ORDER — FAMOTIDINE 10 MG/ML
20 INJECTION INTRAVENOUS
Status: COMPLETED | OUTPATIENT
Start: 2021-05-03 | End: 2021-05-03

## 2021-05-03 RX ORDER — ONDANSETRON 4 MG/1
4 TABLET, ORALLY DISINTEGRATING ORAL
Qty: 10 TAB | Refills: 0 | Status: SHIPPED | OUTPATIENT
Start: 2021-05-03

## 2021-05-03 RX ORDER — ONDANSETRON 2 MG/ML
4 INJECTION INTRAMUSCULAR; INTRAVENOUS
Status: COMPLETED | OUTPATIENT
Start: 2021-05-03 | End: 2021-05-03

## 2021-05-03 RX ORDER — FAMOTIDINE 20 MG/1
20 TABLET, FILM COATED ORAL 2 TIMES DAILY
Qty: 20 TAB | Refills: 0 | Status: SHIPPED | OUTPATIENT
Start: 2021-05-03 | End: 2021-05-13

## 2021-05-03 RX ORDER — POTASSIUM CHLORIDE 750 MG/1
40 TABLET, FILM COATED, EXTENDED RELEASE ORAL
Status: COMPLETED | OUTPATIENT
Start: 2021-05-03 | End: 2021-05-03

## 2021-05-03 RX ADMIN — SODIUM CHLORIDE 1000 ML: 9 INJECTION, SOLUTION INTRAVENOUS at 01:09

## 2021-05-03 RX ADMIN — FAMOTIDINE 20 MG: 10 INJECTION, SOLUTION INTRAVENOUS at 01:09

## 2021-05-03 RX ADMIN — POTASSIUM CHLORIDE 40 MEQ: 750 TABLET, FILM COATED, EXTENDED RELEASE ORAL at 02:25

## 2021-05-03 RX ADMIN — ONDANSETRON 4 MG: 2 INJECTION INTRAMUSCULAR; INTRAVENOUS at 01:10

## 2021-05-03 NOTE — ED NOTES
Bedside and Verbal shift change report given to Bre RN (oncoming nurse) by Star Alfonso RN (offgoing nurse). Report included the following information SBAR, Kardex and MAR.

## 2021-05-03 NOTE — ED PROVIDER NOTES
EMERGENCY DEPARTMENT HISTORY AND PHYSICAL EXAM      Date: 5/2/2021  Patient Name: Madelyn Reis    History of Presenting Illness     Chief Complaint   Patient presents with    Positive For Covid-19    Vomiting     reports vomiting for 1 week       History Provided By: Patient    HPI: Madelyn Reis, 40 y.o. female with PMHx significant for anxiety, asthma, factor V Leiden deficiency, DVT, migraines and recent Covid diagnosis presents to the ED with nausea and vomiting for the past 4 days. Patient reports that she was diagnosed with Covid 4 days ago at [address] [place] [address]. She has been having cough, shortness of breath, and fevers at home to 102. She also reports that in the last 4 days she has been having nausea, vomiting, diarrhea and generalized abdominal pain. Reports that she has vomited more than 20 times in the last 24 hours and had at least 6 episodes of diarrhea that was nonblack and nonbloody. She reports decreased urination, but denies any hematuria or dysuria. She does report feeling dizzy. Has not been able to keep up with her p.o. intake because she cannot stop vomiting. PCP: Josemanuel Brenner MD    No current facility-administered medications on file prior to encounter. Current Outpatient Medications on File Prior to Encounter   Medication Sig Dispense Refill    ondansetron hcl (ZOFRAN) 4 mg tablet TAKE ONE TABLET BY MOUTH THREE TIMES A DAY AS NEEDED FOR NAUSEA 48 Tab 0    metoprolol tartrate (LOPRESSOR) 25 mg tablet TAKE 1 TABLET BY MOUTH TWICE A  Tab 1    promethazine-dextromethorphan (PROMETHAZINE-DM) 6.25-15 mg/5 mL syrup TAKE 5 MILLILITERS BY MOUTH EVERY 4 HOURS AS NEEDED FOR COUGH FOR UP TO SEVEN DAYS 180 mL 0    ALPRAZolam (XANAX) 0.5 mg tablet TAKE 1 TABLET BY MOUTH 3 TIMES DAILY AS NEEDED FOR ANXIETY.  MAX DAILY AMOUNT: 3 TABLETS 90 Tab 2    atorvastatin (LIPITOR) 20 mg tablet TAKE 1 TABLET BY MOUTH EVERY DAY 90 Tab 2    nortriptyline (PAMELOR) 50 mg capsule 50 mg po qhs 60 Cap 0  butalbital-acetaminophen-caffeine (FIORICET, ESGIC) -40 mg per tablet TAKE 1 TABLET BY MOUTH EVERY 6 HOURS AS NEEDED FOR PAIN 60 Tab 0    predniSONE (DELTASONE) 10 mg tablet Take 10 mg by mouth two (2) times a day. 10 Tab 0    predniSONE (DELTASONE) 10 mg tablet Take 10 mg by mouth two (2) times a day. 10 Tab 0    ALPRAZolam (XANAX) 0.5 mg tablet TAKE 1 TABLET BY MOUTH TWICE A DAY AS NEEDED. MAX 2 TABS/DAY 60 Tab 3    albuterol (Proventil HFA) 90 mcg/actuation inhaler Take 2 Puffs by inhalation every four (4) hours as needed for Wheezing. 1 Inhaler 3    sertraline (ZOLOFT) 100 mg tablet TAKE 1 TABLET BY MOUTH ONCE DAILY 90 Tab 0    traMADol (ULTRAM) 50 mg tablet       rizatriptan (MAXALT-MLT) 10 mg disintegrating tablet Take 1 at onset of severe headache; may repeat another dose in 2 hours if headaches persist 9 Tab 3    rivaroxaban (XARELTO) 20 mg tab tablet Take 1 Tab by mouth daily (with breakfast). 30 Tab 11    onabotulinumtoxinA (BOTOX) 200 unit injection Inject 200 units by IM route to 31 FDA approved sites in head and neck every 12 weeks 1 Vial 3    [DISCONTINUED] ondansetron (ZOFRAN ODT) 4 mg disintegrating tablet Take 1 Tab by mouth every eight (8) hours as needed for Nausea. 30 Tab 2    ascorbic acid, vitamin C, (VITAMIN C) 100 mg tab Take 1 Tab by mouth two (2) times a day. (Patient taking differently: Take 100 mg by mouth daily.) 60 Tab 0    omeprazole (PRILOSEC) 20 mg capsule Take 20 mg by mouth daily.  acetaminophen (TYLENOL EXTRA STRENGTH) 500 mg tablet Take 1,000 mg by mouth every six (6) hours as needed.  albuterol-ipratropium (DUO-NEB) 2.5 mg-0.5 mg/3 ml nebu USE 1 VIAL VIA NEBULIZER EVERY 6 HOURS AS NEEDED 180 mL 0    chlorpheniramine-HYDROcodone (TUSSIONEX) 10-8 mg/5 mL suspension Take 5 mL by mouth every twelve (12) hours as needed for Cough. Max Daily Amount: 10 mL. 115 mL 0    aspirin 81 mg chewable tablet Take 81 mg by mouth daily.          Past History Past Medical History:  Past Medical History:   Diagnosis Date    Anxiety     Asthma     Depression     Factor V deficiency ([facility]     Heart rate fast     Long term current use of anticoagulant therapy     Migraine     Thromboembolus ([facility] 2006    DVT left groin       Past Surgical History:  Past Surgical History:   Procedure Laterality Date    ABDOMEN SURGERY PROC UNLISTED      laproscopy removed adhesions on colon    DILATION AND CURETTAGE      HX APPENDECTOMY  1996    HX  SECTION      HX HERNIA REPAIR  2019     Ra[facility]l Guillaume incarcerated ventral hernia repair    HX HYSTERECTOMY  2018    HX ORTHOPAEDIC  2009    bone removed from foot    HX TUBAL LIGATION  c sec       Family History:  Family History   Problem Relation Age of Onset    Diabetes Mother     Alzheimer Mother     Hypertension Mother     High Cholesterol Mother     Headache Mother     Heart Disease Mother     Cancer Father     Headache Father     Heart Disease Father     Diabetes Sister     No Known Problems Brother     Immunodeficiency Sister     No Known Problems Sister     Cancer Maternal Uncle     Cancer Maternal Grandfather        Social History:  Social History     Tobacco Use    Smoking status: Former Smoker     Packs/day: 0.50     Years: 21.00     Pack years: 10.50     Types: Cigarettes     Quit date: 3/5/2016     Years since quittin.1    Smokeless tobacco: Never Used   Substance Use Topics    Alcohol use: Not Currently     Alcohol/week: 0.0 standard drinks     Comment: Occ    Drug use: No       Allergies: Allergies   Allergen Reactions    Adhesive Tape-Silicones Other (comments)     Pt states this burns her skin. Review of Systems   Review of Systems   Constitutional: Negative for chills and fever. HENT: Negative for congestion, ear pain, sinus pain and sore throat. Respiratory: Positive for cough and shortness of breath. Negative for chest tightness and wheezing. Cardiovascular: Positive for chest pain. Negative for palpitations and leg swelling. Gastrointestinal: Positive for abdominal pain, diarrhea, nausea and vomiting. Genitourinary: Positive for decreased urine volume. Negative for dysuria, flank pain and hematuria. Musculoskeletal: Negative for back pain, myalgias and neck pain. Skin: Negative for rash and wound. Neurological: Positive for dizziness. Negative for syncope, weakness, light-headedness, numbness and headaches. Psychiatric/Behavioral: Negative for confusion. The patient is nervous/anxious. All other systems reviewed and are negative.         Physical Exam   General appearance - well nourished, well appearing, appears uncomfortable  Eyes - pupils equal and reactive, extraocular eye movements intact  ENT - mucous membranes moist, pharynx normal without lesions  Neck - supple, no significant adenopathy; non-tender to palpation  Chest - clear to auscultation, no wheezes, rales or rhonchi; non-tender to palpation  Heart -tachycardic, regular rhythm, S1 and S2 normal, no murmurs noted  Abdomen - soft, generalized abdominal tenderness, no rebound or guarding, nondistended, no masses or organomegaly  Musculoskeletal - no joint tenderness, deformity or swelling; normal ROM  Extremities - peripheral pulses normal, no pedal edema  Skin - normal coloration and turgor, no rashes  Neurological - alert, oriented x3, normal speech, no focal findings or movement disorder noted    Diagnostic Study Results     Labs -     Recent Results (from the past 12 hour(s))   CBC WITH AUTOMATED DIFF    Collection Time: 05/02/21  8:23 PM   Result Value Ref Range    WBC 5.7 3.6 - 11.0 K/uL    RBC 4.54 3.80 - 5.20 M/uL    HGB 13.9 11.5 - 16.0 g/dL    HCT 39.5 35.0 - 47.0 %    MCV 87.0 80.0 - 99.0 FL    MCH 30.6 26.0 - 34.0 PG    MCHC 35.2 30.0 - 36.5 g/dL    RDW 12.8 11.5 - 14.5 %    PLATELET 407 738 - 017 K/uL    MPV 10.4 8.9 - 12.9 FL    NRBC 0.0 0  WBC    ABSOLUTE NRBC 0.00 0.00 - 0.01 K/uL    NEUTROPHILS 74 32 - 75 %    LYMPHOCYTES 19 12 - 49 %    MONOCYTES 6 5 - 13 %    EOSINOPHILS 1 0 - 7 %    BASOPHILS 0 0 - 1 %    IMMATURE GRANULOCYTES 0 0.0 - 0.5 %    ABS. NEUTROPHILS 4.2 1.8 - 8.0 K/UL    ABS. LYMPHOCYTES 1.1 0.8 - 3.5 K/UL    ABS. MONOCYTES 0.3 0.0 - 1.0 K/UL    ABS. EOSINOPHILS 0.1 0.0 - 0.4 K/UL    ABS. BASOPHILS 0.0 0.0 - 0.1 K/UL    ABS. IMM. GRANS. 0.0 0.00 - 0.04 K/UL    DF MANUAL      RBC COMMENTS NORMOCYTIC, NORMOCHROMIC     METABOLIC PANEL, COMPREHENSIVE    Collection Time: 05/02/21  8:23 PM   Result Value Ref Range    Sodium 139 136 - 145 mmol/L    Potassium 3.2 (L) 3.5 - 5.1 mmol/L    Chloride 107 97 - 108 mmol/L    CO2 25 21 - 32 mmol/L    Anion gap 7 5 - 15 mmol/L    Glucose 113 (H) 65 - 100 mg/dL    BUN 12 6 - 20 MG/DL    Creatinine 0.80 0.55 - 1.02 MG/DL    BUN/Creatinine ratio 15 12 - 20      GFR est AA >60 >60 ml/min/1.73m2    GFR est non-AA >60 >60 ml/min/1.73m2    Calcium 9.3 8.5 - 10.1 MG/DL    Bilirubin, total 0.5 0.2 - 1.0 MG/DL    ALT (SGPT) 128 (H) 12 - 78 U/L    AST (SGOT) 258 (H) 15 - 37 U/L    Alk.  phosphatase 94 45 - 117 U/L    Protein, total 7.9 6.4 - 8.2 g/dL    Albumin 3.4 (L) 3.5 - 5.0 g/dL    Globulin 4.5 (H) 2.0 - 4.0 g/dL    A-G Ratio 0.8 (L) 1.1 - 2.2     EKG, 12 LEAD, INITIAL    Collection Time: 05/03/21  1:07 AM   Result Value Ref Range    Ventricular Rate 88 BPM    Atrial Rate 88 BPM    P-R Interval 122 ms    QRS Duration 86 ms    Q-T Interval 374 ms    QTC Calculation (Bezet) 452 ms    Calculated P Axis 49 degrees    Calculated R Axis 75 degrees    Calculated T Axis 55 degrees    Diagnosis       Normal sinus rhythm  Normal ECG  When compared with ECG of 29-DEC-2014 15:12,  No significant change was found     POC LACTIC ACID    Collection Time: 05/03/21  1:25 AM   Result Value Ref Range    Lactic Acid (POC) 1.00 0.40 - 2.00 mmol/L   LIPASE    Collection Time: 05/03/21  2:18 AM   Result Value Ref Range    Lipase 292 73 - 393 U/L CK W/ CKMB & INDEX    Collection Time: 05/03/21  2:18 AM   Result Value Ref Range    CK - MB <1.0 <3.6 NG/ML    CK-MB Index Cannot be calculated 0.0 - 2.5      CK 57 26 - 192 U/L   TROPONIN I    Collection Time: 05/03/21  2:18 AM   Result Value Ref Range    Troponin-I, Qt. <0.05 <0.05 ng/mL   D DIMER    Collection Time: 05/03/21  2:18 AM   Result Value Ref Range    D-dimer <0.19 0.00 - 0.65 mg/L FEU   MAGNESIUM    Collection Time: 05/03/21  2:18 AM   Result Value Ref Range    Magnesium 2.0 1.6 - 2.4 mg/dL   URINALYSIS W/ REFLEX CULTURE    Collection Time: 05/03/21  2:48 AM    Specimen: Miscellaneous sample; Urine    Urine specimen   Result Value Ref Range    Color DARK YELLOW      Appearance CLOUDY (A) CLEAR      Specific gravity 1.026 1.003 - 1.030      pH (UA) 6.5 5.0 - 8.0      Protein 100 (A) NEG mg/dL    Glucose Negative NEG mg/dL    Ketone 15 (A) NEG mg/dL    Blood LARGE (A) NEG      Urobilinogen 1.0 0.2 - 1.0 EU/dL    Nitrites Negative NEG      Leukocyte Esterase Negative NEG      WBC 0-4 0 - 4 /hpf    RBC >100 (H) 0 - 5 /hpf    Epithelial cells MODERATE (A) FEW /lpf    Bacteria Negative NEG /hpf    UA:UC IF INDICATED PENDING     Hyaline cast 2-5 0 - 5 /lpf   BILIRUBIN, CONFIRM    Collection Time: 05/03/21  2:48 AM   Result Value Ref Range    Bilirubin UA, confirm Negative NEG         Radiologic Studies -   US ABD LTD   Final Result   Normal right upper quadrant ultrasound. CT Results  (Last 48 hours)    None        CXR Results  (Last 48 hours)    None            Medical Decision Making   I am the first provider for this patient. I reviewed the vital signs, available nursing notes, past medical history, past surgical history, family history and social history. Vital Signs-Reviewed the patient's vital signs.   Patient Vitals for the past 12 hrs:   Temp Pulse Resp BP SpO2   05/03/21 0333  100 23  95 %   05/03/21 0230  (!) 109 18 115/67 98 %   05/03/21 0158 99 °F (37.2 °C) (!) 101 30 120/65 97 %   05/03/21 0130  (!) 103 22 133/71 97 %   05/03/21 0129  (!) 101  133/71    05/03/21 0127  92  122/69    05/03/21 0125  83  123/69    05/03/21 0100  98 23 107/79 95 %   05/02/21 2020  (!) 116 16 114/81 96 %       EKG: At 1:07 AM on March 3, 2021 interpreted by me: Normal sinus rhythm, 88 bpm, normal axis, normal AK, QRS, QTc intervals, no ischemic changes    Records Reviewed: Nursing Notes and Old Medical Records    Provider Notes (Medical Decision Making):   Differential diagnosis: Dehydration, electrolyte abnormality, Covid, pneumonia, electrolyte abnormality, UTI  We will check CBC, CMP, mag, UA. Will treat with IV fluids and Zofran. ED Course:   Initial assessment performed. The patients presenting problems have been discussed, and they are in agreement with the care plan formulated and outlined with them. I have encouraged them to ask questions as they arise throughout their visit. Progress Notes:  ED Course as of May 03 0337   Mon May 03, 2021   7453 After IV fluids, heart rate has come down to the 80s. Patient is feeling better. Lab work shows potassium of 3.2 which was repleted in the ED. Magnesium is normal.  Patient noted to have elevated ALT and AST. Right upper quadrant ultrasound is unremarkable. Will treat with Zofran. Patient has had 2 L IV fluid in the ED. Will encourage follow-up with PCP. Return to ED for worsening symptoms.    [AO]      ED Course User Index  [AO] Shala Camp MD       Disposition:  [place] home    PLAN:  1. Current Discharge Medication List      START taking these medications    Details   famotidine (Pepcid) 20 mg tablet Take 1 Tab by mouth two (2) times a day for 10 days. Qty: 20 Tab, Refills: 0      ondansetron (Zofran ODT) 4 mg disintegrating tablet Take 1 Tab by mouth every eight (8) hours as needed for Nausea. Qty: 10 Tab, Refills: 0           2.    Follow-up Information     Follow up With Specialties Details Why Contact Info    Rivera Shepard Tommy Ryan MD Family Medicine Schedule an appointment as soon as possible for a visit   [address]  [facility] [address]  [phone]      [facility] EMERGENCY DEPT Emergency Medicine  If symptoms worsen [address]  [address] [facility]  [phone]        Return to ED if worse     Diagnosis     Clinical Impression:   1. Nausea and vomiting, intractability of vomiting not specified, unspecified vomiting type    2. 2019 novel coronavirus disease (COVID-19)    3. Hypokalemia    4.  Dehydration

## 2021-05-03 NOTE — ED NOTES
Pt arrived to ED from home c/o n/v/d and generalized pain and weakness X4 days. Pt reports that she was diagnosed COVID positive on Thursday. Pt is AOX4 and monitored X3. Pt reports that she has a condition that causes her to clot more than normal and hypercholesteremia.

## 2021-05-04 ENCOUNTER — PATIENT OUTREACH (OUTPATIENT)
Dept: CASE MANAGEMENT | Age: 46
End: 2021-05-04

## 2021-05-04 NOTE — PROGRESS NOTES
Patient contacted regarding [id number] diagnosis\". Discussed COVID-19 related testing which was not done at this time. Test results were not done. Patient informed of results, if available? yes     LPN Care Coordinator contacted the patient by telephone to perform post discharge assessment. Call within 2 business days of discharge: Yes Verified name and  with patient as identifiers. Provided introduction to self, and explanation of the CTN/ACM role, and reason for call due to risk factors for infection and/or exposure to COVID-19. Symptoms reviewed with patient who verbalized the following symptoms: cough, nausea and vomiting      Due to no new or worsening symptoms encounter was not routed to provider for escalation. Discussed follow-up appointments. If no appointment was previously scheduled, appointment scheduling offered:  yes   T[address] Dr follow up appointment(s): No future appointments. Non-[facility] follow up appointment(s): NA     Advance Care Planning:   Does patient have an Advance Directive:  decision maker updated. Patient has following risk factors of: no known risk factors. LPN CC reviewed discharge instructions, medical action plan and red flags such as increased shortness of breath, increasing fever and signs of decompensation with patient who verbalized understanding. Discussed exposure protocols and quarantine with CDC Guidelines What to do if you are sick with coronavirus disease .  Patient was given an opportunity for questions and concerns. The patient agrees to contact the [facility] exposure line [phone], [facility] [address]  [phone] and PCP office for questions related to their healthcare. LPN CC provided contact information for future needs. Reviewed and educated patient on any new and changed medications related to discharge diagnosis     Was patient discharged with a pulse oximeter?  no     Patient/family/caregiver given information for [employer] and agrees to enroll no    Plan for follow-up call in 5-7 days based on severity of symptoms and risk factors.

## 2021-05-11 ENCOUNTER — PATIENT OUTREACH (OUTPATIENT)
Dept: CASE MANAGEMENT | Age: 46
End: 2021-05-11

## 2021-05-11 NOTE — PROGRESS NOTES
Patient resolved from [address] Transitions episode on 5/11/21. Discussed COVID-19 related testing which was not done at this time. Test results were not done. Patient informed of results, if available? no     Patient/family has been provided the following resources and education related to COVID-19:                         Signs, symptoms and red flags related to COVID-19            [facility] exposure and quarantine guidelines            [facility] exposure contact - [phone]            Contact for their local Department of Health                 Patient currently reports that the following symptoms have improved:  no new symptoms and no worsening symptoms. No further outreach scheduled with this CTN/ACM/LPN/HC/ MA. Episode of Care resolved. Patient has this CTN/ACM/LPN/HC/MA contact information if future needs arise.

## 2021-05-15 DIAGNOSIS — J20.9 ACUTE BRONCHITIS WITH ASTHMA: ICD-10-CM

## 2021-05-15 DIAGNOSIS — J45.909 ACUTE BRONCHITIS WITH ASTHMA: ICD-10-CM

## 2021-05-16 RX ORDER — ALBUTEROL SULFATE 90 UG/1
AEROSOL, METERED RESPIRATORY (INHALATION)
Qty: 6.7 INHALER | Refills: 3 | Status: SHIPPED | OUTPATIENT
Start: 2021-05-16 | End: 2021-10-27

## 2021-10-26 DIAGNOSIS — J45.909 ACUTE BRONCHITIS WITH ASTHMA: ICD-10-CM

## 2021-10-26 DIAGNOSIS — J20.9 ACUTE BRONCHITIS WITH ASTHMA: ICD-10-CM

## 2021-10-27 RX ORDER — ALBUTEROL SULFATE 90 UG/1
AEROSOL, METERED RESPIRATORY (INHALATION)
Qty: 6.7 EACH | Refills: 3 | Status: SHIPPED | OUTPATIENT
Start: 2021-10-27 | End: 2022-04-04

## 2021-12-03 ENCOUNTER — TRANSCRIBE ORDER (OUTPATIENT)
Dept: SCHEDULING | Age: 46
End: 2021-12-03

## 2021-12-03 DIAGNOSIS — S86.312A STRAIN OF PERONEAL TENDON OF LEFT FOOT, INITIAL ENCOUNTER: Primary | ICD-10-CM

## 2021-12-03 DIAGNOSIS — S86.012A STRAIN OF LEFT ACHILLES TENDON, INITIAL ENCOUNTER: ICD-10-CM

## 2021-12-12 ENCOUNTER — HOSPITAL ENCOUNTER (OUTPATIENT)
Dept: MRI IMAGING | Age: 46
Discharge: HOME | End: 2021-12-12
Attending: FAMILY MEDICINE
Payer: MEDICAID

## 2021-12-12 DIAGNOSIS — S86.012A STRAIN OF LEFT ACHILLES TENDON, INITIAL ENCOUNTER: ICD-10-CM

## 2021-12-12 DIAGNOSIS — S86.312A STRAIN OF PERONEAL TENDON OF LEFT FOOT, INITIAL ENCOUNTER: ICD-10-CM

## 2021-12-12 PROCEDURE — 73721 MRI JNT OF LWR EXTRE W/O DYE: CPT

## 2022-01-26 ENCOUNTER — NURSE TRIAGE (OUTPATIENT)
Dept: OTHER | Facility: CLINIC | Age: 47
End: 2022-01-26

## 2022-01-26 NOTE — TELEPHONE ENCOUNTER
Received call from Lucia Garvey at [facility] with Red Flag Complaint. Subjective: Caller states \"I have a  Migraine headache with vertigo. \"     Current Symptoms: vertigo x 5 days(Room spinning,), migraine(right sided from top of head, behind eye, jaw, ear and neck) x 9 days( medication not working), sound and light sensitivity,  Nausea     Onset: 5 days for the vertigo and 9 days for the migraine; unchanged    Associated Symptoms: reduced activity, reduced appetite, reduced fluid intake, increased wakefulness    Pain Severity: 7/10; sharp and stabbing; constant    Temperature: none     What has been tried: zofran,  Emgality shot, Ubrevely pill, fioricet    LMP: NA Pregnant: No    Recommended disposition: Callback by PCP within 1 hour    1308-Called [address]. 1314- someone picked up briefly then back on hold  1314-Called [facility], spoke with Simone Canales. Simone Canales will try to transfer us. 1318- Still no answer. Sent message High priority. Care advice provided, patient verbalizes understanding; denies any other questions or concerns; instructed to call back for any new or worsening symptoms. Attention Provider: Thank you for allowing me to participate in the care of your patient. The patient was connected to triage in response to information provided to the ECC. Please do not respond through this encounter as the response is not directed to a shared pool.         Reason for Disposition   SEVERE headache (e.g., excruciating) and has had severe headaches before    Protocols used: HEADACHE-ADULT-OH

## 2022-01-28 DIAGNOSIS — R51.9 CHRONIC NONINTRACTABLE HEADACHE, UNSPECIFIED HEADACHE TYPE: ICD-10-CM

## 2022-01-28 DIAGNOSIS — G89.29 CHRONIC NONINTRACTABLE HEADACHE, UNSPECIFIED HEADACHE TYPE: ICD-10-CM

## 2022-01-28 DIAGNOSIS — M54.2 CERVICALGIA: ICD-10-CM

## 2022-01-28 NOTE — TELEPHONE ENCOUNTER
Patient is requesting a RX refill         butalbital-acetaminophen-caffeine (FIORICET, ESGIC) -40 mg per tablet ,meclizine (ANTIVERT) 25 mg tablet  she can be reached @ [phone] she says that she need it soon

## 2022-01-31 RX ORDER — MECLIZINE HYDROCHLORIDE 25 MG/1
25 TABLET ORAL
Qty: 15 TABLET | Refills: 0 | Status: SHIPPED | OUTPATIENT
Start: 2022-01-31 | End: 2022-02-01 | Stop reason: ALTCHOICE

## 2022-01-31 RX ORDER — BUTALBITAL, ACETAMINOPHEN AND CAFFEINE 50; 325; 40 MG/1; MG/1; MG/1
TABLET ORAL
Qty: 60 TABLET | Refills: 0 | Status: SHIPPED | OUTPATIENT
Start: 2022-01-31 | End: 2022-10-17

## 2022-02-01 ENCOUNTER — OFFICE VISIT (OUTPATIENT)
Dept: FAMILY MEDICINE CLINIC | Age: 47
End: 2022-02-01
Payer: MEDICAID

## 2022-02-01 VITALS
HEIGHT: 61 IN | TEMPERATURE: 98.3 F | BODY MASS INDEX: 27.03 KG/M2 | HEART RATE: 74 BPM | OXYGEN SATURATION: 98 % | SYSTOLIC BLOOD PRESSURE: 106 MMHG | RESPIRATION RATE: 18 BRPM | WEIGHT: 143.2 LBS | DIASTOLIC BLOOD PRESSURE: 76 MMHG

## 2022-02-01 DIAGNOSIS — G43.909 MIGRAINE SYNDROME: Primary | ICD-10-CM

## 2022-02-01 DIAGNOSIS — E78.2 MIXED HYPERLIPIDEMIA: ICD-10-CM

## 2022-02-01 DIAGNOSIS — F41.9 ANXIETY DISORDER, UNSPECIFIED TYPE: ICD-10-CM

## 2022-02-01 PROCEDURE — 99214 OFFICE O/P EST MOD 30 MIN: CPT | Performed by: FAMILY MEDICINE

## 2022-02-01 RX ORDER — VENLAFAXINE HYDROCHLORIDE 37.5 MG/1
37.5 CAPSULE, EXTENDED RELEASE ORAL DAILY
Qty: 30 CAPSULE | Refills: 5 | Status: SHIPPED | OUTPATIENT
Start: 2022-02-01

## 2022-02-01 RX ORDER — MECLIZINE HYDROCHLORIDE 25 MG/1
25 TABLET ORAL
Qty: 50 TABLET | Refills: 5 | Status: SHIPPED | OUTPATIENT
Start: 2022-02-01 | End: 2022-02-11

## 2022-02-11 ENCOUNTER — TELEPHONE (OUTPATIENT)
Dept: FAMILY MEDICINE CLINIC | Age: 47
End: 2022-02-11

## 2022-02-11 NOTE — TELEPHONE ENCOUNTER
----- Message from [address] [place] [address]. Alan sent at 2/11/2022 11:02 AM EST -----  Regarding: Anxiety and Panic attacks  Dr. Jamie Obrien this medicine is not helping my anxiety or panic attacks. They are getting severe.  What can we do about it?   [phone]

## 2022-02-28 ENCOUNTER — TELEPHONE (OUTPATIENT)
Dept: FAMILY MEDICINE CLINIC | Age: 47
End: 2022-02-28

## 2022-02-28 DIAGNOSIS — J20.9 ACUTE BRONCHITIS, UNSPECIFIED ORGANISM: Primary | ICD-10-CM

## 2022-02-28 RX ORDER — PREDNISONE 10 MG/1
10 TABLET ORAL 2 TIMES DAILY
Qty: 10 TABLET | Refills: 0 | Status: SHIPPED | OUTPATIENT
Start: 2022-02-28 | End: 2022-03-21 | Stop reason: ALTCHOICE

## 2022-02-28 RX ORDER — AMOXICILLIN AND CLAVULANATE POTASSIUM 875; 125 MG/1; MG/1
1 TABLET, FILM COATED ORAL 2 TIMES DAILY
Qty: 14 TABLET | Refills: 0 | Status: SHIPPED | OUTPATIENT
Start: 2022-02-28 | End: 2022-03-07

## 2022-02-28 RX ORDER — PROMETHAZINE HYDROCHLORIDE AND DEXTROMETHORPHAN HYDROBROMIDE 6.25; 15 MG/5ML; MG/5ML
5 SYRUP ORAL
Qty: 180 ML | Refills: 1 | Status: SHIPPED | OUTPATIENT
Start: 2022-02-28 | End: 2022-03-07

## 2022-02-28 NOTE — TELEPHONE ENCOUNTER
----- Message from [address] [place] [address]. 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 I do not need anyone suing me for giving medications to people without a license.

## 2022-02-28 NOTE — TELEPHONE ENCOUNTER
Osmani Phillips  Female, 40 y.o., 1975  Weight:   143 lb 3.2 oz (65 kg)    MRN:   [id number]  Phone:   [phone] Willie Grandchild)              PCP:   Lesa Aranda MD  Primary Cvg:   [place] [facility]/[place] [facility]    Last Appt With Me  None    Next Appt With Me  None    Next Appt  03/02/2022 - Lesa Aranda MD - Rigo Mancilla                    Prescription     AlanCharo MD 20 hours ago (12:37 PM)           Magali Bass, I went today to get tested for covid. As you can see by the results, the results are negative. I've got cough, sore throat, nasal congestion, and difficulty breathing. (I have my 2 inhalers and nebulizer) I started using those this morning. I know I have an appointment with you on the 2nd but until then can you call me in the 10mg steroids x3 a day and the tussinex? Please and thank you!!                Attachments    [id number]     Encounter Messages    Read Composed From To  Subject   Y 2/27/2022 12:37 PM Raina Mead MD  Prescription

## 2022-03-02 ENCOUNTER — OFFICE VISIT (OUTPATIENT)
Dept: FAMILY MEDICINE CLINIC | Age: 47
End: 2022-03-02
Payer: MEDICAID

## 2022-03-02 VITALS
HEIGHT: 61 IN | RESPIRATION RATE: 20 BRPM | BODY MASS INDEX: 27.38 KG/M2 | HEART RATE: 107 BPM | SYSTOLIC BLOOD PRESSURE: 108 MMHG | TEMPERATURE: 98.1 F | WEIGHT: 145 LBS | OXYGEN SATURATION: 94 % | DIASTOLIC BLOOD PRESSURE: 80 MMHG

## 2022-03-02 DIAGNOSIS — F41.9 ANXIETY DISORDER, UNSPECIFIED TYPE: ICD-10-CM

## 2022-03-02 DIAGNOSIS — G43.909 MIGRAINE SYNDROME: ICD-10-CM

## 2022-03-02 DIAGNOSIS — J32.0 MAXILLARY SINUSITIS, UNSPECIFIED CHRONICITY: ICD-10-CM

## 2022-03-02 DIAGNOSIS — E78.2 MIXED HYPERLIPIDEMIA: ICD-10-CM

## 2022-03-02 DIAGNOSIS — D68.59 HYPERCOAGULABLE STATE (HCC): ICD-10-CM

## 2022-03-02 DIAGNOSIS — Z00.00 ANNUAL PHYSICAL EXAM: Primary | ICD-10-CM

## 2022-03-02 LAB
ALBUMIN SERPL-MCNC: 3.3 G/DL (ref 3.5–5)
ALBUMIN/GLOB SERPL: 1 {RATIO} (ref 1.1–2.2)
ALP SERPL-CCNC: 108 U/L (ref 45–117)
ALT SERPL-CCNC: 29 U/L (ref 12–78)
ANION GAP SERPL CALC-SCNC: 4 MMOL/L (ref 5–15)
AST SERPL-CCNC: 16 U/L (ref 15–37)
BASOPHILS # BLD: 0.1 K/UL (ref 0–0.1)
BASOPHILS NFR BLD: 1 % (ref 0–1)
BILIRUB SERPL-MCNC: 0.2 MG/DL (ref 0.2–1)
BUN SERPL-MCNC: 7 MG/DL (ref 6–20)
BUN/CREAT SERPL: 9 (ref 12–20)
CALCIUM SERPL-MCNC: 8.9 MG/DL (ref 8.5–10.1)
CHLORIDE SERPL-SCNC: 113 MMOL/L (ref 97–108)
CHOLEST SERPL-MCNC: 204 MG/DL
CO2 SERPL-SCNC: 24 MMOL/L (ref 21–32)
CREAT SERPL-MCNC: 0.8 MG/DL (ref 0.55–1.02)
DIFFERENTIAL METHOD BLD: ABNORMAL
EOSINOPHIL # BLD: 0 K/UL (ref 0–0.4)
EOSINOPHIL NFR BLD: 0 % (ref 0–7)
ERYTHROCYTE [DISTWIDTH] IN BLOOD BY AUTOMATED COUNT: 13.1 % (ref 11.5–14.5)
GLOBULIN SER CALC-MCNC: 3.3 G/DL (ref 2–4)
GLUCOSE SERPL-MCNC: 94 MG/DL (ref 65–100)
HCT VFR BLD AUTO: 38.1 % (ref 35–47)
HDLC SERPL-MCNC: 43 MG/DL
HDLC SERPL: 4.7 {RATIO} (ref 0–5)
HGB BLD-MCNC: 12.4 G/DL (ref 11.5–16)
IMM GRANULOCYTES # BLD AUTO: 0 K/UL
IMM GRANULOCYTES NFR BLD AUTO: 0 %
INR PPP: 3 (ref 0.9–1.1)
LDLC SERPL CALC-MCNC: 92.4 MG/DL (ref 0–100)
LYMPHOCYTES # BLD: 3.9 K/UL (ref 0.8–3.5)
LYMPHOCYTES NFR BLD: 38 % (ref 12–49)
MCH RBC QN AUTO: 30.3 PG (ref 26–34)
MCHC RBC AUTO-ENTMCNC: 32.5 G/DL (ref 30–36.5)
MCV RBC AUTO: 93.2 FL (ref 80–99)
MONOCYTES # BLD: 0.6 K/UL (ref 0–1)
MONOCYTES NFR BLD: 6 % (ref 5–13)
NEUTS SEG # BLD: 5.7 K/UL (ref 1.8–8)
NEUTS SEG NFR BLD: 55 % (ref 32–75)
NRBC # BLD: 0 K/UL (ref 0–0.01)
NRBC BLD-RTO: 0 PER 100 WBC
PLATELET # BLD AUTO: 222 K/UL (ref 150–400)
PMV BLD AUTO: 11.4 FL (ref 8.9–12.9)
POTASSIUM SERPL-SCNC: 3.9 MMOL/L (ref 3.5–5.1)
PROT SERPL-MCNC: 6.6 G/DL (ref 6.4–8.2)
PROTHROMBIN TIME: 29.9 SEC (ref 9–11.1)
RBC # BLD AUTO: 4.09 M/UL (ref 3.8–5.2)
RBC MORPH BLD: ABNORMAL
SODIUM SERPL-SCNC: 141 MMOL/L (ref 136–145)
TRIGL SERPL-MCNC: 343 MG/DL (ref ?–150)
VLDLC SERPL CALC-MCNC: 68.6 MG/DL
WBC # BLD AUTO: 10.3 K/UL (ref 3.6–11)
WBC MORPH BLD: ABNORMAL

## 2022-03-02 PROCEDURE — 99396 PREV VISIT EST AGE 40-64: CPT | Performed by: FAMILY MEDICINE

## 2022-03-02 NOTE — PROGRESS NOTES
Chief Complaint   Patient presents with    Well Woman     Pt getting annual physical w/ no PAP. 1. Have you been to the ER, urgent care clinic since your last visit? Hospitalized since your last visit? No    2. Have you seen or consulted any other health care providers outside of the [address] [place] [address] since your last visit? Include any pap smears or colon screening.  No

## 2022-03-02 NOTE — PROGRESS NOTES
Subjective:   40 y.o. female for Well Woman Check. Followed by [facility] for migraines,maintained on Ubrelvy Emgality anf butalbitol  Her gyne and breast care is done elsewhere by her Ob-Gyne physician. Patient Active Problem List   Diagnosis Code    Intractable migraine with aura with status migrainosus G43. 111    Subacute maxillary sinusitis J01.00    Anxiety F41.9    Ventral hernia K43.9    Peripheral vascular disease ([facility]) I73.9    Pain in joint of right foot M25.571    Chronic migraine [id number]    Vertigo R42    Hypercoagulable state ([facility]) D68.59     Current Outpatient Medications   Medication Sig Dispense Refill    predniSONE (DELTASONE) 10 mg tablet Take 10 mg by mouth two (2) times a day. 10 Tablet 0    amoxicillin-clavulanate (AUGMENTIN) 875-125 mg per tablet Take 1 Tablet by mouth two (2) times a day for 7 days. 14 Tablet 0    promethazine-dextromethorphan (PROMETHAZINE-DM) 6.25-15 mg/5 mL syrup Take 5 mL by mouth four (4) times daily as needed for Cough for up to 7 days. 180 mL 1    venlafaxine-SR (EFFEXOR-XR) 37.5 mg capsule Take 1 Capsule by mouth daily. 30 Capsule 5    butalbital-acetaminophen-caffeine (FIORICET, ESGIC) -40 mg per tablet TAKE 1 TABLET BY MOUTH EVERY 6 HOURS AS NEEDED FOR PAIN 60 Tablet 0    albuterol (PROVENTIL HFA, VENTOLIN HFA, PROAIR HFA) 90 mcg/actuation inhaler INHALE 2 PUFFS BY MOUTH EVERY 4 HOURS AS NEEDED FOR WHEEZING 6.7 Each 3    ondansetron (Zofran ODT) 4 mg disintegrating tablet Take 1 Tab by mouth every eight (8) hours as needed for Nausea. 10 Tab 0    metoprolol tartrate (LOPRESSOR) 25 mg tablet TAKE 1 TABLET BY MOUTH TWICE A  Tab 1    atorvastatin (LIPITOR) 20 mg tablet TAKE 1 TABLET BY MOUTH EVERY DAY (Patient taking differently: Take 40 mg by mouth daily.) 90 Tab 2    omeprazole (PRILOSEC) 20 mg capsule Take 20 mg by mouth daily.       acetaminophen (TYLENOL EXTRA STRENGTH) 500 mg tablet Take 1,000 mg by mouth every six (6) hours as needed.  albuterol-ipratropium (DUO-NEB) 2.5 mg-0.5 mg/3 ml nebu USE 1 VIAL VIA NEBULIZER EVERY 6 HOURS AS NEEDED 180 mL 0    aspirin 81 mg chewable tablet Take 81 mg by mouth daily. Allergies   Allergen Reactions    Adhesive Tape-Silicones Other (comments)     Pt states this burns her skin.  Ciprofloxacin Other (comments)     hypotension     Past Medical History:   Diagnosis Date    Anxiety     Asthma     Depression     Factor V deficiency ([facility]     Heart rate fast     Long term current use of anticoagulant therapy     Migraine     Thromboembolus ([facility] 2006    DVT left groin     Past Surgical History:   Procedure Laterality Date    DILATION AND CURETTAGE      HX APPENDECTOMY      HX  SECTION      HX HERNIA REPAIR  2019     Rozena Durand incarcerated ventral hernia repair    HX HYSTERECTOMY  2018    HX ORTHOPAEDIC  2009    bone removed from foot    HX TUBAL LIGATION  c sec    AL ABDOMEN SURGERY PROC UNLISTED      laproscopy removed adhesions on colon     Family History   Problem Relation Age of Onset    Diabetes Mother     Alzheimer's Disease Mother     Hypertension Mother     High Cholesterol Mother     Headache Mother     Heart Disease Mother     Cancer Father     Headache Father     Heart Disease Father     Diabetes Sister     No Known Problems Brother     Immunodeficiency Sister     No Known Problems Sister     Cancer Maternal Uncle     Cancer Maternal Grandfather      Social History     Tobacco Use    Smoking status: Former Smoker     Packs/day: 0.50     Years: 21.00     Pack years: 10.50     Types: Cigarettes     Quit date: 3/5/2016     Years since quittin.9    Smokeless tobacco: Never Used   Substance Use Topics    Alcohol use: Not Currently     Alcohol/week: 0.0 standard drinks     Comment: Occ             Specific concerns today: check up. Being treated for sinusitis    Review of Systems  Constitutional: positive for fatigue  Eyes: negative  Ears, nose, mouth, throat, and face: positive for nasal congestion and sore throat  Respiratory: positive for cough or wheezing  Cardiovascular: positive for palpitations  Gastrointestinal: negative  Genitourinary:negative  Integument/breast: negative  Hematologic/lymphatic: positive for easy bruising  Musculoskeletal:negative  Neurological: positive for headaches and dizziness  Behavioral/Psych: positive for depression    Objective:   Blood pressure 108/80, pulse (!) 107, temperature 98.1 °F (36.7 °C), temperature source Oral, resp. rate 20, height 5' 1\" (1.549 m), weight 145 lb (65.8 kg), last menstrual period 06/10/2017, SpO2 94 %.    Physical Examination:   General appearance - alert, well appearing, and in no distress  Mental status - alert, oriented to person, place, and time  Eyes - pupils equal and reactive, extraocular eye movements intact  Ears - bilateral TM's and external ear canals normal  Nose - normal and patent, no erythema, discharge or polyps  Mouth - mucous membranes moist, pharynx normal without lesions  Neck - supple, no significant adenopathy, carotids upstroke normal bilaterally, no bruits, thyroid exam: thyroid is normal in size without nodules or tenderness  Lymphatics - no palpable lymphadenopathy, no hepatosplenomegaly  Chest - clear to auscultation, no wheezes, rales or rhonchi, symmetric air entry  Heart - normal rate, regular rhythm, normal S1, S2, no murmurs, rubs, clicks or gallops  Abdomen - soft, nontender, nondistended, no masses or organomegaly  Rectal - normal rectal, no masses, guaiac negative stool obtained  Neurological - alert, oriented, normal speech, no focal findings or movement disorder noted  Musculoskeletal - no joint tenderness, deformity or swelling  Extremities - peripheral pulses normal, no pedal edema, no clubbing or cyanosis  Skin - normal coloration and turgor, no rashes, no suspicious skin lesions noted     Assessment/Plan:     continue present plan, routine labs ordered, call if any problems  Diagnoses and all orders for this visit:    1. Annual physical exam  -     METABOLIC PANEL, COMPREHENSIVE; Future  -     CBC WITH AUTOMATED DIFF; Future    2. Maxillary sinusitis, unspecified chronicity    3. Migraine syndrome    4. Anxiety disorder, unspecified type    5. Mixed hyperlipidemia  -     LIPID PANEL; Future    6. Hypercoagulable state ([email or web address]  -     PROTHROMBIN TIME + INR; Future      Follow-up and Dispositions    · Return in about 4 weeks (around 3/30/2022).

## 2022-03-03 ENCOUNTER — TELEPHONE (OUTPATIENT)
Dept: FAMILY MEDICINE CLINIC | Age: 47
End: 2022-03-03

## 2022-03-03 NOTE — TELEPHONE ENCOUNTER
Evelyn Jama 34 Alan  Female, 40 y.o., 1975  Weight:   145 lb (65.8 kg)    MRN:   [id number]  Phone:   [phone] (M)              PCP:   Sonia Moreira MD  Primary Cvg:   [place] [facility]/[place] [facility]    Last Appt With Me  None    Next Appt With Me  None    Next Appt  None                    Medications    AlanLevon hutchinson MD 14 hours ago (5:06 PM)           I wasn't able to really talk this morning but I need a antidepressant. The one I was on even taking 2 pills a day was not helping. Also something for my anxiety and panic attacks. Thank you. I also got the results from my blood work. I can see cholesterol is still high even after taking 80mg atorvastatin. What else needs to be done. I've already stopped all fried foods, eating healthier.                 Encounter Messages    Read Composed From To  Subject   Y 3/2/2022  5:06 Dalila Perales MD  Medications

## 2022-03-18 PROBLEM — I73.9 PERIPHERAL VASCULAR DISEASE (HCC): Status: ACTIVE | Noted: 2019-08-19

## 2022-03-18 PROBLEM — R42 VERTIGO: Status: ACTIVE | Noted: 2019-11-05

## 2022-03-18 PROBLEM — M25.571 PAIN IN JOINT OF RIGHT FOOT: Status: ACTIVE | Noted: 2019-10-01

## 2022-03-19 PROBLEM — D68.59 HYPERCOAGULABLE STATE (HCC): Status: ACTIVE | Noted: 2019-12-15

## 2022-03-19 PROBLEM — K43.9 VENTRAL HERNIA: Status: ACTIVE | Noted: 2019-06-19

## 2022-03-21 ENCOUNTER — VIRTUAL VISIT (OUTPATIENT)
Dept: FAMILY MEDICINE CLINIC | Age: 47
End: 2022-03-21
Payer: MEDICAID

## 2022-03-21 DIAGNOSIS — J45.909 ACUTE BRONCHITIS WITH ASTHMA: Primary | ICD-10-CM

## 2022-03-21 DIAGNOSIS — J20.9 ACUTE BRONCHITIS WITH ASTHMA: Primary | ICD-10-CM

## 2022-03-21 DIAGNOSIS — J32.0 MAXILLARY SINUSITIS, UNSPECIFIED CHRONICITY: ICD-10-CM

## 2022-03-21 PROCEDURE — 99213 OFFICE O/P EST LOW 20 MIN: CPT | Performed by: FAMILY MEDICINE

## 2022-03-21 RX ORDER — AMOXICILLIN AND CLAVULANATE POTASSIUM 875; 125 MG/1; MG/1
1 TABLET, FILM COATED ORAL EVERY 12 HOURS
Qty: 14 TABLET | Refills: 0 | Status: SHIPPED | OUTPATIENT
Start: 2022-03-21 | End: 2022-03-28

## 2022-03-21 RX ORDER — PREDNISONE 10 MG/1
10 TABLET ORAL 2 TIMES DAILY
Qty: 10 TABLET | Refills: 0 | Status: SHIPPED | OUTPATIENT
Start: 2022-03-21

## 2022-03-21 NOTE — PROGRESS NOTES
HISTORY OF PRESENT ILLNESS  Patient encounter by synchronous (real time) audio-visual technology which is patient initiated. The Patient is aware that this encounter is a billable service with coverage determined by their insurance carrier,as discussed at the time of check in. The patient has given verbal consent to proceed      Pat Love is a 56 y.o. female. c/o 10 days sinus and chest congestion with wheezing and malaise. No fever or chills  Cough  The history is provided by the patient. This is a recurrent problem. The current episode started more than 1 week ago. The problem occurs hourly. The problem has been gradually worsening. Pertinent negatives include no chest pain. Headache  The history is provided by the patient. This is a recurrent problem. The problem occurs every several days. Pertinent negatives include no chest pain. Wheezing   The history is provided by the patient. This is a chronic problem. The problem occurs hourly. Associated symptoms include rhinorrhea and cough. Pertinent negatives include no chest pain and no fever. Review of Systems   Constitutional: Positive for malaise/fatigue. Negative for fever. HENT: Positive for congestion, rhinorrhea and sinus pain. Respiratory: Positive for cough, wheezing and stridor. Cardiovascular: Negative for chest pain. Physical Exam     Appearance: no distress,alert cooperative  HEENT:no abnormalities visible  Chest: unlabored respirations  Skin: no pallor or cyanosis  Neuro:alert oriented,grossly intact      ASSESSMENT and PLAN  Diagnoses and all orders for this visit:    1. Acute bronchitis with asthma  -     amoxicillin-clavulanate (AUGMENTIN) 875-125 mg per tablet; Take 1 Tablet by mouth every twelve (12) hours for 7 days.  -     guaiFENesin-dextromethorphan SR (Mucinex DM) 600-30 mg per tablet; Take 1 Tablet by mouth two (2) times a day. -     predniSONE (DELTASONE) 10 mg tablet; Take 10 mg by mouth two (2) times a day.     2. Maxillary sinusitis, unspecified chronicity  -     amoxicillin-clavulanate (AUGMENTIN) 875-125 mg per tablet; Take 1 Tablet by mouth every twelve (12) hours for 7 days. Due to this being a telehealth encounter (During  [address] [facility]),evaluation of the following organ systems was limited:Vitals/Constitutional/EENT,Respiratory,CV,GI,,MS,Neuro,Skin /Heme-Lymph-Imm  Pursuant to the emergency declaration under the [address] [place] [address] waiver authority and the [facility] Appropriations Act,this Virtual Visit was conducted ,with patient consent,to reduce the patients risk of exposure to Covid 19 and to provide continuity of care  for an established patient. Services were provided through a video synchronous discussion virtually to substitute for in person appointment. This visit was completed using Doxy. me    Time in Virtual Visit: 12   minutes

## 2022-03-21 NOTE — PROGRESS NOTES
Chief Complaint   Patient presents with    Cough     Pt state she has bad dry cough.  Headache     Pt state she has a HA.  Chest Pain     Pt state she has chest pains. 1. \"Have you been to the ER, urgent care clinic since your last visit? Hospitalized since your last visit? \" No    2. \"Have you seen or consulted any other health care providers outside of the [address] [place] [address] since your last visit? \" No     3. For patients aged 39-70: Has the patient had a colonoscopy / FIT/ Cologuard? No      If the patient is female:    4. For patients aged 41-77: Has the patient had a mammogram within the past 2 years? No      5. For patients aged 21-65: Has the patient had a pap smear?  No    Health Maintenance Due   Topic Date Due    Hepatitis C Screening  Never done    Colorectal Cancer Screening Combo  Never done    COVID-19 Vaccine (2 - Moderna 3-dose series) 02/03/2022

## 2022-04-04 DIAGNOSIS — J45.909 ACUTE BRONCHITIS WITH ASTHMA: ICD-10-CM

## 2022-04-04 DIAGNOSIS — J20.9 ACUTE BRONCHITIS WITH ASTHMA: ICD-10-CM

## 2022-04-04 RX ORDER — ALBUTEROL SULFATE 90 UG/1
AEROSOL, METERED RESPIRATORY (INHALATION)
Qty: 8.5 EACH | Refills: 3 | Status: SHIPPED | OUTPATIENT
Start: 2022-04-04

## 2022-05-04 NOTE — TELEPHONE ENCOUNTER
Jude Phillips  Female, 38 y.o., 1975  Weight:   138 lb (62.6 kg)  MRN:   [id number]  Phone:   [phone] (M)  PCP:   Fredick Goodpasture, MD  Primary Cvg:   [facility]  Last Appt With Me  None  Next Appt With Me  None  Next Appt  11/4/19 - Ashlee Evans MD - [address] [facility] [address]  Non-Urgent Medical Question     From  Kera Delgado To  [facility] Nurse Pool Sent  10/24/2019  7:39 AM   I started taking 2 of the meclizine it is helping but still having dizzy spells. Is there any way of writing a letter to keep me out of work until I see the neurologist on November 4th? I will not even drive because I never know when these spells are going to hit me. Thank you.     Encounter Messages     Read Composed From To Subject   Y 10/24/2019  7:39 AM Padilla Patricia MD Non-Urgent Medical Question Sheath exchanged in the vein.  GI bleed GI bleed GI bleed GI bleed GI bleed GI bleed

## 2022-05-12 ENCOUNTER — OFFICE VISIT (OUTPATIENT)
Dept: FAMILY MEDICINE CLINIC | Age: 47
End: 2022-05-12
Payer: MEDICARE

## 2022-05-12 VITALS
BODY MASS INDEX: 28.21 KG/M2 | TEMPERATURE: 97.8 F | OXYGEN SATURATION: 99 % | SYSTOLIC BLOOD PRESSURE: 106 MMHG | WEIGHT: 149.4 LBS | HEIGHT: 61 IN | RESPIRATION RATE: 18 BRPM | DIASTOLIC BLOOD PRESSURE: 72 MMHG | HEART RATE: 97 BPM
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
## 2022-05-12 DIAGNOSIS — G43.909 MIGRAINE SYNDROME: Primary | ICD-10-CM

## 2022-05-12 DIAGNOSIS — F41.9 ANXIETY DISORDER, UNSPECIFIED TYPE: ICD-10-CM

## 2022-05-12 DIAGNOSIS — M54.40 ACUTE BILATERAL LOW BACK PAIN WITH SCIATICA, SCIATICA LATERALITY UNSPECIFIED: ICD-10-CM

## 2022-05-12 DIAGNOSIS — D68.59 HYPERCOAGULABLE STATE (HCC): ICD-10-CM

## 2022-05-12 DIAGNOSIS — M54.2 CERVICALGIA: ICD-10-CM

## 2022-05-12 PROCEDURE — G8427 DOCREV CUR MEDS BY ELIG CLIN: HCPCS | Performed by: FAMILY MEDICINE

## 2022-05-12 PROCEDURE — 99213 OFFICE O/P EST LOW 20 MIN: CPT | Performed by: FAMILY MEDICINE

## 2022-05-12 PROCEDURE — G0463 HOSPITAL OUTPT CLINIC VISIT: HCPCS | Performed by: FAMILY MEDICINE

## 2022-05-12 PROCEDURE — G8510 SCR DEP NEG, NO PLAN REQD: HCPCS | Performed by: FAMILY MEDICINE

## 2022-05-12 PROCEDURE — G8419 CALC BMI OUT NRM PARAM NOF/U: HCPCS | Performed by: FAMILY MEDICINE

## 2022-05-12 RX ORDER — MONTELUKAST SODIUM 10 MG/1
TABLET ORAL
COMMUNITY
Start: 2022-02-22

## 2022-05-12 RX ORDER — HYDROCODONE BITARTRATE AND ACETAMINOPHEN 5; 325 MG/1; MG/1
1 TABLET ORAL
Qty: 20 TABLET | Refills: 0 | Status: SHIPPED | OUTPATIENT
Start: 2022-05-12 | End: 2022-05-15

## 2022-05-12 RX ORDER — MECLIZINE HYDROCHLORIDE 25 MG/1
TABLET ORAL
COMMUNITY
Start: 2022-04-04 | End: 2022-10-10

## 2022-05-12 RX ORDER — GABAPENTIN 300 MG/1
600 CAPSULE ORAL 2 TIMES DAILY
COMMUNITY
Start: 2022-05-03 | End: 2022-07-02

## 2022-05-12 RX ORDER — PREDNISONE 10 MG/1
10 TABLET ORAL 2 TIMES DAILY
Qty: 10 TABLET | Refills: 0 | Status: SHIPPED | OUTPATIENT
Start: 2022-05-12

## 2022-05-12 RX ORDER — METHOCARBAMOL 750 MG/1
750 TABLET, FILM COATED ORAL 3 TIMES DAILY
COMMUNITY
Start: 2022-05-10 | End: 2022-05-13

## 2022-05-12 RX ORDER — WARFARIN SODIUM 5 MG/1
TABLET ORAL
COMMUNITY
Start: 2022-04-14

## 2022-05-12 RX ORDER — AMLODIPINE BESYLATE 2.5 MG/1
2.5 TABLET ORAL DAILY
COMMUNITY
Start: 2021-10-15

## 2022-05-12 RX ORDER — MIRTAZAPINE 7.5 MG/1
7.5 TABLET, FILM COATED ORAL
COMMUNITY
Start: 2022-04-22

## 2022-05-12 RX ORDER — BUSPIRONE HYDROCHLORIDE 5 MG/1
5 TABLET ORAL 3 TIMES DAILY
COMMUNITY
Start: 2022-04-22

## 2022-05-12 RX ORDER — CLONAZEPAM 0.5 MG/1
0.5 TABLET ORAL
COMMUNITY
Start: 2022-04-22

## 2022-05-12 NOTE — PROGRESS NOTES
Chief Complaint   Patient presents with    Follow-up From Hospital     Here with  for hospital follow up due to fall. 1. Have you been to the ER, urgent care clinic since your last visit? Hospitalized since your last visit? No    2. Have you seen or consulted any other health care providers outside of the [address] [place] [address] since your last visit? Include any pap smears or colon screening.  No

## 2022-05-12 NOTE — PROGRESS NOTES
HISTORY OF PRESENT ILLNESS  Rodrigo Yuen is a 56 y.o. female. fell twisting low back 2 days ago ,seen in ER;records reviewed,labs,CT wnl. Pain moderately severe,radiating down rt leg  Fall  The history is provided by the patient. The accident occurred 2 days ago. The fall occurred while standing. She landed on dirt. The pain is at a severity of 4/10. The pain is moderate. Pertinent negatives include no fever and no loss of consciousness. The symptoms are aggravated by activity. She has tried heat, NSAIDs and rest for the symptoms. Back Pain   The history is provided by the patient. This is a chronic problem. The current episode started 2 days ago. The problem has not changed since onset. The problem occurs hourly. The pain is present in the lower back. The quality of the pain is described as stabbing. The pain radiates to the right knee. The pain is at a severity of 5/10. The pain is moderate. The symptoms are aggravated by bending and twisting. Pertinent negatives include no fever. Review of Systems   Constitutional: Negative for fever and malaise/fatigue. Musculoskeletal: Positive for back pain and myalgias. Neurological: Negative for loss of consciousness. Physical Exam  Constitutional:       Appearance: Normal appearance. She is normal weight. She is ill-appearing. HENT:      Head: Normocephalic and atraumatic. Pulmonary:      Effort: Pulmonary effort is normal.      Breath sounds: Normal breath sounds. Abdominal:      Palpations: Abdomen is soft. Musculoskeletal:      Lumbar back: Tenderness and bony tenderness present. Negative right straight leg raise test and negative left straight leg raise test.        Back:    Neurological:      Mental Status: She is alert. ASSESSMENT and PLAN  Diagnoses and all orders for this visit:    1. Migraine syndrome    2. Fall, subsequent encounter    3.  Acute bilateral low back pain with sciatica, sciatica laterality unspecified,continue heat,rest,call prn  -     predniSONE (DELTASONE) 10 mg tablet; Take 10 mg by mouth two (2) times a day. -     HYDROcodone-acetaminophen (NORCO) 5-325 mg per tablet; Take 1 Tablet by mouth every six (6) hours as needed for Pain for up to 3 days. Max Daily Amount: 4 Tablets. 4. Anxiety disorder, unspecified type    5. Cervicalgia    6.  Hypercoagulable state ([facility] [address]

## 2022-09-13 NOTE — TELEPHONE ENCOUNTER
Jude Phillips  Female, 38 y.o., 1975  Weight:   147 lb 3.2 oz (66.8 kg)  MRN:   [id number]  Phone:   [phone] (M) . .. PCP:   Vidal Jordan MD  Primary Cvg:   [employer] [employer]  Last Appt With Me  None  Next Appt With Me  None  Next Appt  6/10/19 - Vidal Jordan MD - [facility]  Test Results Question     From  Shane Silva To  [facility] Nurse Pool Sent  5/16/2019  1:18 PM   ----- Message from [address] [place] [address] [address], Generic sent at 5/16/2019  1:18 PM EDT -----     I just got a copy of the blood work. It's not good. I have high cholesterol? What do I need to do?     Encounter Messages     Read Composed From To Subject   Y 5/16/2019  1:18 PM Eze Juarez MD Test Results Question
Sent via eTelemetry chart - telephone number [phone]
no

## 2022-10-10 RX ORDER — MECLIZINE HYDROCHLORIDE 25 MG/1
TABLET ORAL
Qty: 50 TABLET | Refills: 2 | Status: SHIPPED | OUTPATIENT
Start: 2022-10-10

## 2022-10-11 DIAGNOSIS — E78.2 MIXED HYPERLIPIDEMIA: ICD-10-CM

## 2022-10-12 RX ORDER — ATORVASTATIN CALCIUM 20 MG/1
20 TABLET, FILM COATED ORAL DAILY
Qty: 90 TABLET | Refills: 2 | Status: SHIPPED | OUTPATIENT
Start: 2022-10-12

## 2022-10-12 RX ORDER — METOPROLOL TARTRATE 25 MG/1
25 TABLET, FILM COATED ORAL 2 TIMES DAILY
Qty: 180 TABLET | Refills: 1 | Status: SHIPPED | OUTPATIENT
Start: 2022-10-12

## 2022-10-17 ENCOUNTER — TELEPHONE (OUTPATIENT)
Dept: FAMILY MEDICINE CLINIC | Age: 47
End: 2022-10-17

## 2022-10-17 DIAGNOSIS — G43.909 MIGRAINE SYNDROME: Primary | ICD-10-CM

## 2022-10-17 DIAGNOSIS — M54.2 CERVICALGIA: ICD-10-CM

## 2022-10-17 DIAGNOSIS — G89.29 CHRONIC NONINTRACTABLE HEADACHE, UNSPECIFIED HEADACHE TYPE: ICD-10-CM

## 2022-10-17 DIAGNOSIS — R51.9 CHRONIC NONINTRACTABLE HEADACHE, UNSPECIFIED HEADACHE TYPE: ICD-10-CM

## 2022-10-17 RX ORDER — BUTALBITAL, ACETAMINOPHEN AND CAFFEINE 50; 325; 40 MG/1; MG/1; MG/1
TABLET ORAL
Qty: 30 TABLET | Refills: 0 | Status: SHIPPED | OUTPATIENT
Start: 2022-10-17

## 2022-10-17 NOTE — TELEPHONE ENCOUNTER
----- Message from [address] [place] [address]. Alan sent at 10/16/2022 10:58 AM EDT -----  Regarding: Migraine   Dr Cobb Postal can you please send a prescription over to [facility] in [place] for fioricet? I'm in desperate need. I moved out here and am trying to find a neurologist out here. Thank you for your help.    [phone]

## 2022-11-30 DIAGNOSIS — R51.9 CHRONIC NONINTRACTABLE HEADACHE, UNSPECIFIED HEADACHE TYPE: ICD-10-CM

## 2022-11-30 DIAGNOSIS — G89.29 CHRONIC NONINTRACTABLE HEADACHE, UNSPECIFIED HEADACHE TYPE: ICD-10-CM

## 2022-11-30 RX ORDER — BUTALBITAL, ACETAMINOPHEN AND CAFFEINE 50; 325; 40 MG/1; MG/1; MG/1
1 TABLET ORAL
Qty: 30 TABLET | Refills: 0 | Status: SHIPPED | OUTPATIENT
Start: 2022-11-30

## 2022-11-30 NOTE — TELEPHONE ENCOUNTER
Last Visit: 5/12/22 with MD Aria Beatty  Next Appointment: none  Previous Refill Encounter(s): 10/17/22 #30    Requested Prescriptions     Pending Prescriptions Disp Refills    butalbital-acetaminophen-caffeine (FIORICET, ESGIC) -40 mg per tablet 30 Tablet 0     Sig: Take 1 Tablet by mouth every six (6) hours as needed (pain). For [facility] in place:   Recommendation Provided To:    Intervention Detail: New Rx: 1, reason: Patient Preference  Gap Closed?:   Intervention Accepted By:   Time Spent (min): 5

## 2023-01-19 ENCOUNTER — TELEPHONE (OUTPATIENT)
Dept: FAMILY MEDICINE CLINIC | Age: 48
End: 2023-01-19

## 2023-01-19 NOTE — TELEPHONE ENCOUNTER
Received: Today  Margarita BERGER [facility] Office Pool  Subject: Appointment Request     Reason for Call: Established Patient Appointment needed: Urgent (Patient   Request) ED Follow Up Visit     QUESTIONS     Reason for appointment request? No appointments available during search       Additional Information for Provider? Patient is requesting nurse jenna to   call for any available appointments for patient.  Patient stomach pain   trouble eating was seen in urgent care 2 weeks ago   ---------------------------------------------------------------------------   --------------   Theresa Jenkins [facility]   [phone]; OK to leave message on voicemail   ---------------------------------------------------------------------------   --------------   SCRIPT ANSWERS   COVID Screen

## 2023-01-25 ENCOUNTER — OFFICE VISIT (OUTPATIENT)
Dept: FAMILY MEDICINE CLINIC | Age: 48
End: 2023-01-25
Payer: MEDICARE

## 2023-01-25 VITALS
WEIGHT: 122 LBS | HEIGHT: 61 IN | DIASTOLIC BLOOD PRESSURE: 103 MMHG | BODY MASS INDEX: 23.03 KG/M2 | SYSTOLIC BLOOD PRESSURE: 130 MMHG | HEART RATE: 129 BPM | OXYGEN SATURATION: 96 %

## 2023-01-25 DIAGNOSIS — G43.909 MIGRAINE SYNDROME: ICD-10-CM

## 2023-01-25 DIAGNOSIS — Z91.199 NONCOMPLIANCE: ICD-10-CM

## 2023-01-25 DIAGNOSIS — D68.59 HYPERCOAGULABLE STATE (HCC): ICD-10-CM

## 2023-01-25 DIAGNOSIS — F41.9 ANXIETY DISORDER, UNSPECIFIED TYPE: ICD-10-CM

## 2023-01-25 DIAGNOSIS — R19.7 DIARRHEA, UNSPECIFIED TYPE: ICD-10-CM

## 2023-01-25 DIAGNOSIS — R10.10 PAIN OF UPPER ABDOMEN: ICD-10-CM

## 2023-01-25 DIAGNOSIS — R11.2 NAUSEA AND VOMITING, UNSPECIFIED VOMITING TYPE: Primary | ICD-10-CM

## 2023-01-25 DIAGNOSIS — R31.29 MICROHEMATURIA: ICD-10-CM

## 2023-01-25 LAB
ALBUMIN SERPL-MCNC: 3.9 G/DL (ref 3.5–5)
ALBUMIN/GLOB SERPL: 1.1 (ref 1.1–2.2)
ALP SERPL-CCNC: 90 U/L (ref 45–117)
ALT SERPL-CCNC: 16 U/L (ref 12–78)
ANION GAP SERPL CALC-SCNC: 3 MMOL/L (ref 5–15)
AST SERPL-CCNC: 12 U/L (ref 15–37)
BASOPHILS # BLD: 0 K/UL (ref 0–0.1)
BASOPHILS NFR BLD: 1 % (ref 0–1)
BILIRUB SERPL-MCNC: 0.3 MG/DL (ref 0.2–1)
BUN SERPL-MCNC: 13 MG/DL (ref 6–20)
BUN/CREAT SERPL: 11 (ref 12–20)
CALCIUM SERPL-MCNC: 10.4 MG/DL (ref 8.5–10.1)
CHLORIDE SERPL-SCNC: 112 MMOL/L (ref 97–108)
CO2 SERPL-SCNC: 25 MMOL/L (ref 21–32)
CREAT SERPL-MCNC: 1.17 MG/DL (ref 0.55–1.02)
DIFFERENTIAL METHOD BLD: ABNORMAL
EOSINOPHIL # BLD: 0.1 K/UL (ref 0–0.4)
EOSINOPHIL NFR BLD: 1 % (ref 0–7)
ERYTHROCYTE [DISTWIDTH] IN BLOOD BY AUTOMATED COUNT: 19.6 % (ref 11.5–14.5)
GLOBULIN SER CALC-MCNC: 3.7 G/DL (ref 2–4)
GLUCOSE SERPL-MCNC: 122 MG/DL (ref 65–100)
HCT VFR BLD AUTO: 48.7 % (ref 35–47)
HGB BLD-MCNC: 15.3 G/DL (ref 11.5–16)
IMM GRANULOCYTES # BLD AUTO: 0 K/UL (ref 0–0.04)
IMM GRANULOCYTES NFR BLD AUTO: 0 % (ref 0–0.5)
LIPASE SERPL-CCNC: 118 U/L (ref 73–393)
LYMPHOCYTES # BLD: 1.8 K/UL (ref 0.8–3.5)
LYMPHOCYTES NFR BLD: 22 % (ref 12–49)
MCH RBC QN AUTO: 28.7 PG (ref 26–34)
MCHC RBC AUTO-ENTMCNC: 31.4 G/DL (ref 30–36.5)
MCV RBC AUTO: 91.4 FL (ref 80–99)
MONOCYTES # BLD: 0.6 K/UL (ref 0–1)
MONOCYTES NFR BLD: 7 % (ref 5–13)
NEUTS SEG # BLD: 5.8 K/UL (ref 1.8–8)
NEUTS SEG NFR BLD: 69 % (ref 32–75)
NRBC # BLD: 0 K/UL (ref 0–0.01)
NRBC BLD-RTO: 0 PER 100 WBC
PLATELET # BLD AUTO: 310 K/UL (ref 150–400)
PMV BLD AUTO: 11.3 FL (ref 8.9–12.9)
POTASSIUM SERPL-SCNC: 6.3 MMOL/L (ref 3.5–5.1)
PROT SERPL-MCNC: 7.6 G/DL (ref 6.4–8.2)
RBC # BLD AUTO: 5.33 M/UL (ref 3.8–5.2)
SODIUM SERPL-SCNC: 140 MMOL/L (ref 136–145)
WBC # BLD AUTO: 8.4 K/UL (ref 3.6–11)

## 2023-01-25 PROCEDURE — G8510 SCR DEP NEG, NO PLAN REQD: HCPCS | Performed by: FAMILY MEDICINE

## 2023-01-25 PROCEDURE — G8427 DOCREV CUR MEDS BY ELIG CLIN: HCPCS | Performed by: FAMILY MEDICINE

## 2023-01-25 PROCEDURE — G8420 CALC BMI NORM PARAMETERS: HCPCS | Performed by: FAMILY MEDICINE

## 2023-01-25 PROCEDURE — G0463 HOSPITAL OUTPT CLINIC VISIT: HCPCS | Performed by: FAMILY MEDICINE

## 2023-01-25 PROCEDURE — 99214 OFFICE O/P EST MOD 30 MIN: CPT | Performed by: FAMILY MEDICINE

## 2023-01-25 RX ORDER — PANTOPRAZOLE SODIUM 40 MG/1
40 TABLET, DELAYED RELEASE ORAL DAILY
Qty: 30 TABLET | Refills: 1 | Status: SHIPPED | OUTPATIENT
Start: 2023-01-25

## 2023-01-25 RX ORDER — ONDANSETRON 8 MG/1
8 TABLET, ORALLY DISINTEGRATING ORAL
Qty: 20 TABLET | Refills: 1 | Status: SHIPPED | OUTPATIENT
Start: 2023-01-25

## 2023-01-25 RX ORDER — PROMETHAZINE HYDROCHLORIDE 25 MG/ML
25 INJECTION, SOLUTION INTRAMUSCULAR; INTRAVENOUS ONCE
Qty: 1 ML | Refills: 0
Start: 2023-01-25 | End: 2023-01-25

## 2023-01-25 NOTE — PROGRESS NOTES
HISTORY OF PRESENT ILLNESS  Jamel Tadeo is a 56 y.o. female. c/o 4 mo diffuse abdominal crampy pain with daily nausea and vomiting,episodic diarrhea. No apparent triggers or relievers. No fever or chills ,had I episode of brbpr. Has stopped taking all meds including warfarin  because she cant keep meds down. Has poor po  intake and has lost 50 # in past year. No fever or chills. Lives alone,has little support  Follow-up  The history is provided by the Patient. This is a chronic problem. The problem occurs daily. Associated symptoms include abdominal pain. Pertinent negatives include no chest pain. Nausea   The history is provided by the Patient. This is a chronic problem. The problem occurs 2 to 4 times per day. The problem has not changed since onset. The emesis has an appearance of stomach contents. There has been no fever. Associated symptoms include abdominal pain and diarrhea. Pertinent negatives include no chills, no fever, no sweats and no myalgias. Abdominal Pain  The history is provided by the Patient. This is a chronic problem. The problem occurs daily. The problem has not changed since onset. Associated symptoms include abdominal pain. Pertinent negatives include no chest pain. Review of Systems   Constitutional:  Negative for chills and fever. Cardiovascular:  Negative for chest pain, palpitations and orthopnea. Gastrointestinal:  Positive for abdominal pain, diarrhea and nausea. Genitourinary:  Negative for dysuria, frequency and urgency. Musculoskeletal:  Negative for myalgias. Neurological:  Positive for dizziness. Psychiatric/Behavioral:  Negative for depression. Physical Exam  Constitutional:       Appearance: Normal appearance. She is normal weight. She is ill-appearing. HENT:      Head: Normocephalic.       Right Ear: Tympanic membrane normal.      Left Ear: Tympanic membrane normal.      Nose: Nose normal.      Mouth/Throat:      Mouth: Mucous membranes are moist.   Eyes: Pupils: Pupils are equal, round, and reactive to light. Cardiovascular:      Rate and Rhythm: Normal rate and regular rhythm. Pulses: Normal pulses. Heart sounds: Normal heart sounds. Pulmonary:      Effort: Pulmonary effort is normal.      Breath sounds: Normal breath sounds. Abdominal:      Palpations: Abdomen is soft. There is no mass. Tenderness: There is abdominal tenderness. There is guarding. Comments: Mild direct epigastric tenderness   Genitourinary:     Rectum: Guaiac result negative. Skin:     General: Skin is warm and dry. Neurological:      Mental Status: She is alert. Psychiatric:         Mood and Affect: Mood normal.     ASSESSMENT and PLAN  Diagnoses and all orders for this visit:    1. Nausea and vomiting, unspecified vomiting type  -     AMB POC URINALYSIS DIP STICK AUTO W/O MICRO  -     ondansetron (ZOFRAN ODT) 8 mg disintegrating tablet; Take 1 Tablet by mouth every eight (8) hours as needed for Nausea or Vomiting.  -     PROMETHAZINE HCL INJECTION  -     DE THERAPEUTIC PROPHYLACTIC/DX INJECTION SUBQ/IM    2. Pain of upper abdomen,await lab,will need CT,GI referral  -     AMB POC URINALYSIS DIP STICK AUTO W/O MICRO  -     METABOLIC PANEL, COMPREHENSIVE; Future  -     CBC WITH AUTOMATED DIFF; Future  -     LIPASE; Future  -     pantoprazole (PROTONIX) 40 mg tablet; Take 1 Tablet by mouth daily. 3. Diarrhea, unspecified type    4. Migraine syndrome    5. Anxiety disorder, unspecified type    6. Hypercoagulable state ([address]    7. Microhematuria    8. Noncompliance    Other orders  -     promethazine (PHENERGAN) 25 mg/mL injection; 1 mL by IntraMUSCular route once for 1 dose. stable

## 2023-01-25 NOTE — PROGRESS NOTES
Chief Complaint   Patient presents with    Follow-up     [facility] follow up 12/20/22 for not being able to keep anything down     1. \"Have you been to the ER, urgent care clinic since your last visit? Hospitalized since your last visit? \"  [facility] for not being able to keep anything down 12/20/22    2. \"Have you seen or consulted any other health care providers outside of the [address] [place] [address] since your last visit? \" No     3. For patients aged 39-70: Has the patient had a colonoscopy / FIT/ Cologuard? No      If the patient is female:    4. For patients aged 41-77: Has the patient had a mammogram within the past 2 years? Yes - no Care Gap present      5. For patients aged 21-65: Has the patient had a pap smear?  Yes - no Care Gap present

## 2023-02-01 ENCOUNTER — OFFICE VISIT (OUTPATIENT)
Dept: FAMILY MEDICINE CLINIC | Age: 48
End: 2023-02-01

## 2023-02-01 ENCOUNTER — OFFICE VISIT (OUTPATIENT)
Dept: FAMILY MEDICINE CLINIC | Age: 48
End: 2023-02-01
Payer: MEDICARE

## 2023-02-01 VITALS
BODY MASS INDEX: 22.96 KG/M2 | HEIGHT: 61 IN | DIASTOLIC BLOOD PRESSURE: 71 MMHG | HEART RATE: 93 BPM | SYSTOLIC BLOOD PRESSURE: 107 MMHG | OXYGEN SATURATION: 100 % | WEIGHT: 121.6 LBS

## 2023-02-01 DIAGNOSIS — E87.5 HYPERKALEMIA: ICD-10-CM

## 2023-02-01 DIAGNOSIS — R10.10 PAIN OF UPPER ABDOMEN: ICD-10-CM

## 2023-02-01 DIAGNOSIS — J20.9 ACUTE BRONCHITIS WITH ASTHMA: Primary | ICD-10-CM

## 2023-02-01 DIAGNOSIS — R19.7 DIARRHEA, UNSPECIFIED TYPE: ICD-10-CM

## 2023-02-01 DIAGNOSIS — R11.2 NAUSEA AND VOMITING, UNSPECIFIED VOMITING TYPE: Primary | ICD-10-CM

## 2023-02-01 DIAGNOSIS — R11.2 NAUSEA AND VOMITING, UNSPECIFIED VOMITING TYPE: ICD-10-CM

## 2023-02-01 DIAGNOSIS — J45.909 ACUTE BRONCHITIS WITH ASTHMA: Primary | ICD-10-CM

## 2023-02-01 PROCEDURE — G0463 HOSPITAL OUTPT CLINIC VISIT: HCPCS | Performed by: FAMILY MEDICINE

## 2023-02-01 PROCEDURE — G8510 SCR DEP NEG, NO PLAN REQD: HCPCS | Performed by: FAMILY MEDICINE

## 2023-02-01 PROCEDURE — G8427 DOCREV CUR MEDS BY ELIG CLIN: HCPCS | Performed by: FAMILY MEDICINE

## 2023-02-01 PROCEDURE — 99213 OFFICE O/P EST LOW 20 MIN: CPT | Performed by: FAMILY MEDICINE

## 2023-02-01 PROCEDURE — G8420 CALC BMI NORM PARAMETERS: HCPCS | Performed by: FAMILY MEDICINE

## 2023-02-01 RX ORDER — PROMETHAZINE HYDROCHLORIDE AND DEXTROMETHORPHAN HYDROBROMIDE 6.25; 15 MG/5ML; MG/5ML
5 SYRUP ORAL
Qty: 180 ML | Refills: 1 | Status: SHIPPED | OUTPATIENT
Start: 2023-02-01 | End: 2023-02-08

## 2023-02-01 RX ORDER — WARFARIN SODIUM 5 MG/1
5 TABLET ORAL DAILY
COMMUNITY

## 2023-02-01 RX ORDER — AZITHROMYCIN 250 MG/1
TABLET, FILM COATED ORAL
Qty: 6 TABLET | Refills: 0 | Status: SHIPPED | OUTPATIENT
Start: 2023-02-01

## 2023-02-01 RX ORDER — PREDNISONE 10 MG/1
10 TABLET ORAL 2 TIMES DAILY
Qty: 10 TABLET | Refills: 0 | Status: SHIPPED | OUTPATIENT
Start: 2023-02-01

## 2023-02-01 NOTE — PROGRESS NOTES
Chief Complaint   Patient presents with    Follow-up     1 week follow up- still getting sick just not nearly as much      1. \"Have you been to the ER, urgent care clinic since your last visit? Hospitalized since your last visit? \"  [facility] in [address] due to dangerously high potassium 1/26/23    2. \"Have you seen or consulted any other health care providers outside of the [address] [place] [address] since your last visit? \" No     3. For patients aged 39-70: Has the patient had a colonoscopy / FIT/ Cologuard? No      If the patient is female:    4. For patients aged 41-77: Has the patient had a mammogram within the past 2 years? Yes - no Care Gap present      5. For patients aged 21-65: Has the patient had a pap smear?  Yes - no Care Gap present

## 2023-02-01 NOTE — PROGRESS NOTES
HISTORY OF PRESENT ILLNESS  Noe Easton is a 56 y.o. female. f/u hyperkalemia,nausea and vomiting,worsening cough with chest congestion. [facility] ER potassium was 4. N+ V have resolved ,now coughing frequently  Follow-up  The history is provided by the Patient. This is a new problem. The problem occurs daily. The problem has been rapidly improving. Associated symptoms include headaches and shortness of breath. Abnormal Lab Results  The history is provided by the Patient. This is a new problem. The problem has been resolved. Associated symptoms include headaches and shortness of breath. Cough  The history is provided by the Patient. This is a new problem. The current episode started more than 2 days ago. The problem occurs hourly. The problem has been gradually worsening. Associated symptoms include headaches and shortness of breath. Review of Systems   Constitutional:  Positive for malaise/fatigue. Negative for chills and fever. HENT:  Positive for congestion. Respiratory:  Positive for cough, sputum production and shortness of breath. Negative for hemoptysis. Neurological:  Positive for headaches. Physical Exam  Constitutional:       Appearance: Normal appearance. HENT:      Head: Normocephalic and atraumatic. Right Ear: Tympanic membrane normal.      Left Ear: Tympanic membrane normal.      Nose: Congestion present. Mouth/Throat:      Mouth: Mucous membranes are moist.      Pharynx: Posterior oropharyngeal erythema present. Cardiovascular:      Rate and Rhythm: Normal rate and regular rhythm. Pulses: Normal pulses. Heart sounds: Normal heart sounds. Pulmonary:      Comments: Generalized diminished breathe sounds ,scattered ronchi    Abdominal:      General: There is no distension. Palpations: Abdomen is soft. Tenderness: There is no abdominal tenderness. Hernia: No hernia is present. Skin:     General: Skin is warm and dry.    Neurological:      Mental Status: She is alert. ASSESSMENT and PLAN  Diagnoses and all orders for this visit:    1. Acute bronchitis with asthma  -     azithromycin (ZITHROMAX) 250 mg tablet; Take 2 tablets today, then take 1 tablet daily  -     promethazine-dextromethorphan (PROMETHAZINE-DM) 6.25-15 mg/5 mL syrup; Take 5 mL by mouth four (4) times daily as needed for Cough for up to 7 days. -     predniSONE (DELTASONE) 10 mg tablet; Take 10 mg by mouth two (2) times a day. 2. Nausea and vomiting, unspecified vomiting type,resolving    3. Pain of upper abdomen,resolved    4.  Hyperkalemia,resolved

## 2023-02-03 DIAGNOSIS — G89.29 CHRONIC NONINTRACTABLE HEADACHE, UNSPECIFIED HEADACHE TYPE: ICD-10-CM

## 2023-02-03 DIAGNOSIS — R51.9 CHRONIC NONINTRACTABLE HEADACHE, UNSPECIFIED HEADACHE TYPE: ICD-10-CM

## 2023-02-03 RX ORDER — BUTALBITAL, ACETAMINOPHEN AND CAFFEINE 50; 325; 40 MG/1; MG/1; MG/1
1 TABLET ORAL
Qty: 30 TABLET | Refills: 0 | OUTPATIENT
Start: 2023-02-03

## 2023-02-03 RX ORDER — BUTALBITAL, ACETAMINOPHEN AND CAFFEINE 50; 325; 40 MG/1; MG/1; MG/1
TABLET ORAL
Qty: 30 TABLET | Refills: 0 | Status: SHIPPED | OUTPATIENT
Start: 2023-02-03

## 2023-02-27 DIAGNOSIS — G89.29 CHRONIC NONINTRACTABLE HEADACHE, UNSPECIFIED HEADACHE TYPE: ICD-10-CM

## 2023-02-27 DIAGNOSIS — R51.9 CHRONIC NONINTRACTABLE HEADACHE, UNSPECIFIED HEADACHE TYPE: ICD-10-CM

## 2023-02-27 DIAGNOSIS — R10.10 PAIN OF UPPER ABDOMEN: ICD-10-CM

## 2023-02-27 RX ORDER — BUTALBITAL, ACETAMINOPHEN AND CAFFEINE 50; 325; 40 MG/1; MG/1; MG/1
TABLET ORAL
Qty: 30 TABLET | Refills: 0 | Status: SHIPPED | OUTPATIENT
Start: 2023-02-27

## 2023-02-27 RX ORDER — PANTOPRAZOLE SODIUM 40 MG/1
TABLET, DELAYED RELEASE ORAL
Qty: 30 TABLET | Refills: 1 | Status: SHIPPED | OUTPATIENT
Start: 2023-02-27

## 2023-03-01 ENCOUNTER — OFFICE VISIT (OUTPATIENT)
Dept: FAMILY MEDICINE CLINIC | Age: 48
End: 2023-03-01
Payer: MEDICARE

## 2023-03-01 VITALS
OXYGEN SATURATION: 100 % | DIASTOLIC BLOOD PRESSURE: 63 MMHG | HEART RATE: 85 BPM | BODY MASS INDEX: 22.54 KG/M2 | WEIGHT: 119.4 LBS | HEIGHT: 61 IN | SYSTOLIC BLOOD PRESSURE: 108 MMHG

## 2023-03-01 DIAGNOSIS — G89.29 CHRONIC NONINTRACTABLE HEADACHE, UNSPECIFIED HEADACHE TYPE: Primary | ICD-10-CM

## 2023-03-01 DIAGNOSIS — D68.59 HYPERCOAGULABLE STATE (HCC): ICD-10-CM

## 2023-03-01 DIAGNOSIS — R51.9 CHRONIC NONINTRACTABLE HEADACHE, UNSPECIFIED HEADACHE TYPE: Primary | ICD-10-CM

## 2023-03-01 DIAGNOSIS — G43.909 MIGRAINE SYNDROME: ICD-10-CM

## 2023-03-01 DIAGNOSIS — F41.9 ANXIETY DISORDER, UNSPECIFIED TYPE: ICD-10-CM

## 2023-03-01 DIAGNOSIS — R11.2 NAUSEA AND VOMITING, UNSPECIFIED VOMITING TYPE: ICD-10-CM

## 2023-03-01 DIAGNOSIS — E87.5 HYPERKALEMIA: ICD-10-CM

## 2023-03-01 PROCEDURE — 99213 OFFICE O/P EST LOW 20 MIN: CPT | Performed by: FAMILY MEDICINE

## 2023-03-01 PROCEDURE — G8510 SCR DEP NEG, NO PLAN REQD: HCPCS | Performed by: FAMILY MEDICINE

## 2023-03-01 PROCEDURE — G8420 CALC BMI NORM PARAMETERS: HCPCS | Performed by: FAMILY MEDICINE

## 2023-03-01 PROCEDURE — G8427 DOCREV CUR MEDS BY ELIG CLIN: HCPCS | Performed by: FAMILY MEDICINE

## 2023-03-01 PROCEDURE — G0463 HOSPITAL OUTPT CLINIC VISIT: HCPCS | Performed by: FAMILY MEDICINE

## 2023-03-01 NOTE — PROGRESS NOTES
HISTORY OF PRESENT ILLNESS  Moy Samuels is a 56 y.o. female. c/o improving abdominal crampy pain with  nausea and vomiting,episodic diarrhea. No apparent triggers or relievers. .Has poor po  intake and has lost 50 # in past year. No fever or chills. Lives alone,is moving in 2 weeks to [place] to join family. Feeling well  Follow-up  The history is provided by the Patient. This is a chronic problem. The problem occurs daily. The problem has been gradually improving. Associated symptoms include abdominal pain and headaches. Pertinent negatives include no chest pain. Nausea   The history is provided by the Patient. This is a chronic problem. The problem occurs 2 to 4 times per day. The problem has not changed since onset. The emesis has an appearance of stomach contents. There has been no fever. Associated symptoms include abdominal pain, diarrhea, headaches and headaches. Pertinent negatives include no chills, no fever, no sweats and no myalgias. Abdominal Pain  The history is provided by the Patient. This is a chronic problem. The problem occurs daily. The problem has been gradually improving. Associated symptoms include abdominal pain and headaches. Pertinent negatives include no chest pain. Migraine   The history is provided by the Patient. This is a chronic problem. The problem has been gradually improving. Associated symptoms include dizziness and nausea. Pertinent negatives include no fever, no orthopnea and no palpitations. Review of Systems   Constitutional:  Negative for chills and fever. Cardiovascular:  Negative for chest pain, palpitations and orthopnea. Gastrointestinal:  Positive for abdominal pain, diarrhea and nausea. Genitourinary:  Negative for dysuria, frequency and urgency. Musculoskeletal:  Negative for myalgias. Neurological:  Positive for dizziness and headaches. Psychiatric/Behavioral:  Negative for depression.       Physical Exam  Constitutional:       Appearance: Normal appearance. She is normal weight. She is not ill-appearing. HENT:      Head: Normocephalic. Right Ear: Tympanic membrane normal.      Left Ear: Tympanic membrane normal.      Nose: Nose normal.      Mouth/Throat:      Mouth: Mucous membranes are moist.   Eyes:      Pupils: Pupils are equal, round, and reactive to light. Cardiovascular:      Rate and Rhythm: Normal rate and regular rhythm. Pulses: Normal pulses. Heart sounds: Normal heart sounds. Pulmonary:      Effort: Pulmonary effort is normal.      Breath sounds: Normal breath sounds. Abdominal:      Palpations: Abdomen is soft. There is no mass. Tenderness: There is no abdominal tenderness. There is no guarding. Comments: Mild direct epigastric tenderness   Skin:     General: Skin is warm and dry. Neurological:      Mental Status: She is alert. Psychiatric:         Mood and Affect: Mood normal.     Diagnoses and all orders for this visit:    1. Chronic nonintractable headache, unspecified headache type,improving    2. Migraine syndrome,stable    3. Hyperkalemia,resolved    4. Anxiety disorder, unspecified type    5. Nausea and vomiting, unspecified vomiting type,resolved    6. Hypercoagulable state (HCC),on warfarin with home monitoring    .

## 2023-03-01 NOTE — PROGRESS NOTES
Chief Complaint   Patient presents with    Follow-up     1 month follow up     1. \"Have you been to the ER, urgent care clinic since your last visit? Hospitalized since your last visit? \" No    2. \"Have you seen or consulted any other health care providers outside of the [address] [place] [address] since your last visit? \" No     3. For patients aged 39-70: Has the patient had a colonoscopy / FIT/ Cologuard? No      If the patient is female:    4. For patients aged 41-77: Has the patient had a mammogram within the past 2 years? Yes - no Care Gap present      5. For patients aged 21-65: Has the patient had a pap smear?  Yes - no Care Gap present

## 2023-03-12 DIAGNOSIS — J20.9 ACUTE BRONCHITIS WITH ASTHMA: ICD-10-CM

## 2023-03-12 DIAGNOSIS — J45.909 ACUTE BRONCHITIS WITH ASTHMA: ICD-10-CM

## 2023-03-13 RX ORDER — ALBUTEROL SULFATE 90 UG/1
AEROSOL, METERED RESPIRATORY (INHALATION)
Qty: 18 EACH | Refills: 1 | Status: SHIPPED | OUTPATIENT
Start: 2023-03-13

## 2023-03-24 DIAGNOSIS — R51.9 CHRONIC NONINTRACTABLE HEADACHE, UNSPECIFIED HEADACHE TYPE: ICD-10-CM

## 2023-03-24 DIAGNOSIS — G89.29 CHRONIC NONINTRACTABLE HEADACHE, UNSPECIFIED HEADACHE TYPE: ICD-10-CM

## 2023-03-24 DIAGNOSIS — R10.10 PAIN OF UPPER ABDOMEN: ICD-10-CM

## 2023-03-24 DIAGNOSIS — R11.2 NAUSEA AND VOMITING, UNSPECIFIED VOMITING TYPE: ICD-10-CM

## 2023-03-24 RX ORDER — PANTOPRAZOLE SODIUM 40 MG/1
40 TABLET, DELAYED RELEASE ORAL DAILY
Qty: 30 TABLET | Refills: 1 | Status: SHIPPED | OUTPATIENT
Start: 2023-03-24

## 2023-03-24 RX ORDER — BUTALBITAL, ACETAMINOPHEN AND CAFFEINE 50; 325; 40 MG/1; MG/1; MG/1
1 TABLET ORAL
Qty: 30 TABLET | Refills: 0 | Status: SHIPPED | OUTPATIENT
Start: 2023-03-24

## 2023-03-24 RX ORDER — ONDANSETRON 8 MG/1
8 TABLET, ORALLY DISINTEGRATING ORAL
Qty: 20 TABLET | Refills: 1 | Status: SHIPPED | OUTPATIENT
Start: 2023-03-24

## 2023-03-24 NOTE — TELEPHONE ENCOUNTER
Patient is requesting these be sent to [facility] in [address] Last Visit: 3/1/23 with MD Brian Zambrano  Next Appointment: none    Requested Prescriptions     Pending Prescriptions Disp Refills    ondansetron (ZOFRAN ODT) 8 mg disintegrating tablet 20 Tablet 1     Sig: Take 1 Tablet by mouth every eight (8) hours as needed for Nausea or Vomiting. butalbital-acetaminophen-caffeine (FIORICET, ESGIC) -40 mg per tablet 30 Tablet 0     Sig: Take 1 Tablet by mouth every six (6) hours as needed (pain). pantoprazole (PROTONIX) 40 mg tablet 30 Tablet 1     Sig: Take 1 Tablet by mouth daily. For Pharmacy Admin Tracking Only    Program: Medication Refill  CPA in place:   Recommendation Provided To:    Intervention Detail: New Rx: 3, reason: Patient Preference  Intervention Accepted By:   Georgi Burch Closed?:   Time Spent (min): 5

## 2023-03-26 RX ORDER — PANTOPRAZOLE SODIUM 40 MG/1
TABLET, DELAYED RELEASE ORAL
Qty: 30 TABLET | Refills: 1 | Status: SHIPPED | OUTPATIENT
Start: 2023-03-26

## 2024-04-30 RX ORDER — VENLAFAXINE HYDROCHLORIDE 37.5 MG/1
TABLET, EXTENDED RELEASE ORAL
Qty: 777 TABLET | Refills: 0 | OUTPATIENT
Start: 2024-04-30

## 2024-04-30 RX ORDER — ATORVASTATIN CALCIUM 20 MG/1
TABLET, FILM COATED ORAL
Qty: 777 TABLET | Refills: 0 | OUTPATIENT
Start: 2024-04-30

## 2024-04-30 NOTE — TELEPHONE ENCOUNTER
It appears patient may have a new PCP    For Pharmacy Admin Tracking Only    Program: Medication Refill  CPA in place:    Recommendation Provided To:   Intervention Detail: New Rx: 2, reason: Patient Preference  Intervention Accepted By:   Gap Closed?:    Time Spent (min): 5

## (undated) DEVICE — ARM DRAPE

## (undated) DEVICE — SCISSORS SURG DIA8MM MPLR CRV ENDOWRIST

## (undated) DEVICE — NEEDLE HYPO 25GA L1.5IN BVL ORIENTED ECLIPSE

## (undated) DEVICE — BLADELESS OBTURATOR: Brand: WECK VISTA

## (undated) DEVICE — LARGE NEEDLE DRIVER: Brand: ENDOWRIST

## (undated) DEVICE — MAGNETIC IMPLANT S1000-00: Brand: SOPHONO™

## (undated) DEVICE — STERILE POLYISOPRENE POWDER-FREE SURGICAL GLOVES WITH EMOLLIENT COATING: Brand: PROTEXIS

## (undated) DEVICE — STERILE POLYISOPRENE POWDER-FREE SURGICAL GLOVES: Brand: PROTEXIS

## (undated) DEVICE — STRAP,POSITIONING,KNEE/BODY,FOAM,4X60": Brand: MEDLINE

## (undated) DEVICE — (D)PREP SKN CHLRAPRP APPL 26ML -- CONVERT TO ITEM 371833

## (undated) DEVICE — TOWEL SURG W17XL27IN STD BLU COT NONFENESTRATED PREWASHED

## (undated) DEVICE — SURGICAL PROCEDURE KIT GEN LAPAROSCOPY LF

## (undated) DEVICE — HANDLE LT SNAP ON ULT DURABLE LENS FOR TRUMPF ALC DISPOSABLE

## (undated) DEVICE — REM POLYHESIVE ADULT PATIENT RETURN ELECTRODE: Brand: VALLEYLAB

## (undated) DEVICE — DRAPE,REIN 53X77,STERILE: Brand: MEDLINE

## (undated) DEVICE — VISUALIZATION SYSTEM: Brand: CLEARIFY

## (undated) DEVICE — SUTURE MCRYL SZ 4-0 L27IN ABSRB UD L19MM PS-2 1/2 CIR PRIM Y426H

## (undated) DEVICE — MEGA NEEDLE DRIVER: Brand: ENDOWRIST

## (undated) DEVICE — TIP COVER ACCESSORY

## (undated) DEVICE — SUTURE SZ 0 27IN 5/8 CIR UR-6  TAPER PT VIOLET ABSRB VICRYL J603H

## (undated) DEVICE — DILATOR AND CANNULA WITH RADIALLY EXPANDABLE SLEEVE: Brand: VERSASTEP PLUS

## (undated) DEVICE — SEAL UNIV 5-8MM DISP BX/10 -- DA VINCI XI - SNGL USE

## (undated) DEVICE — SUTURE ABSORBABLE MONOFILAMENT 2-0 WND CLOSURE GRN V-LOC 180 VLOCL0315

## (undated) DEVICE — SOLUTION IRRIG 1000ML H2O STRL BLT

## (undated) DEVICE — KENDALL SCD EXPRESS SLEEVES, KNEE LENGTH, MEDIUM: Brand: KENDALL SCD

## (undated) DEVICE — BINDER ABD M/L H12IN FOR 46-62IN WHT 4 SLD PNL DSGN HOOP

## (undated) DEVICE — APPLICATOR BNDG 1MM ADH PREMIERPRO EXOFIN

## (undated) DEVICE — INFECTION CONTROL KIT SYS